# Patient Record
Sex: FEMALE | Race: WHITE | NOT HISPANIC OR LATINO | Employment: UNEMPLOYED | URBAN - METROPOLITAN AREA
[De-identification: names, ages, dates, MRNs, and addresses within clinical notes are randomized per-mention and may not be internally consistent; named-entity substitution may affect disease eponyms.]

---

## 2022-07-07 ENCOUNTER — HOSPITAL ENCOUNTER (INPATIENT)
Facility: HOSPITAL | Age: 22
LOS: 4 days | Discharge: HOME | End: 2022-07-11
Attending: EMERGENCY MEDICINE | Admitting: INTERNAL MEDICINE

## 2022-07-07 ENCOUNTER — APPOINTMENT (OUTPATIENT)
Dept: GENERAL RADIOLOGY | Facility: HOSPITAL | Age: 22
End: 2022-07-07

## 2022-07-07 DIAGNOSIS — E66.9 OBESITY (BMI 30-39.9): ICD-10-CM

## 2022-07-07 DIAGNOSIS — E13.10 DIABETIC KETOACIDOSIS WITHOUT COMA ASSOCIATED WITH OTHER SPECIFIED DIABETES MELLITUS: Primary | ICD-10-CM

## 2022-07-07 LAB
ALBUMIN SERPL-MCNC: 4.6 G/DL (ref 3.5–5.2)
ALBUMIN/GLOB SERPL: 1.4 G/DL
ALP SERPL-CCNC: 97 U/L (ref 39–117)
ALT SERPL W P-5'-P-CCNC: 21 U/L (ref 1–33)
ANION GAP SERPL CALCULATED.3IONS-SCNC: 20 MMOL/L (ref 5–15)
ANION GAP SERPL CALCULATED.3IONS-SCNC: 23 MMOL/L (ref 5–15)
ARTERIAL PATENCY WRIST A: ABNORMAL
AST SERPL-CCNC: 15 U/L (ref 1–32)
ATMOSPHERIC PRESS: ABNORMAL MM[HG]
B-HCG UR QL: NEGATIVE
B-OH-BUTYR SERPL-SCNC: 5.65 MMOL/L (ref 0.02–0.27)
BACTERIA UR QL AUTO: ABNORMAL /HPF
BASE EXCESS BLDA CALC-SCNC: -19.3 MMOL/L (ref 0–2)
BASOPHILS # BLD AUTO: 0.07 10*3/MM3 (ref 0–0.2)
BASOPHILS NFR BLD AUTO: 0.9 % (ref 0–1.5)
BDY SITE: ABNORMAL
BILIRUB SERPL-MCNC: 0.7 MG/DL (ref 0–1.2)
BILIRUB UR QL STRIP: NEGATIVE
BODY TEMPERATURE: 37 C
BUN SERPL-MCNC: 10 MG/DL (ref 6–20)
BUN SERPL-MCNC: 8 MG/DL (ref 6–20)
BUN/CREAT SERPL: 12.3 (ref 7–25)
BUN/CREAT SERPL: 12.3 (ref 7–25)
CALCIUM SPEC-SCNC: 7.6 MG/DL (ref 8.6–10.5)
CALCIUM SPEC-SCNC: 9.2 MG/DL (ref 8.6–10.5)
CHLORIDE SERPL-SCNC: 105 MMOL/L (ref 98–107)
CHLORIDE SERPL-SCNC: 99 MMOL/L (ref 98–107)
CLARITY UR: CLEAR
CO2 BLDA-SCNC: 7 MMOL/L (ref 22–33)
CO2 SERPL-SCNC: 9 MMOL/L (ref 22–29)
CO2 SERPL-SCNC: 9 MMOL/L (ref 22–29)
COHGB MFR BLD: 1.1 % (ref 0–2)
COLOR UR: YELLOW
CREAT SERPL-MCNC: 0.65 MG/DL (ref 0.57–1)
CREAT SERPL-MCNC: 0.81 MG/DL (ref 0.57–1)
DEPRECATED RDW RBC AUTO: 42.6 FL (ref 37–54)
EGFRCR SERPLBLD CKD-EPI 2021: 104.1 ML/MIN/1.73
EGFRCR SERPLBLD CKD-EPI 2021: 126.3 ML/MIN/1.73
EOSINOPHIL # BLD AUTO: 0.06 10*3/MM3 (ref 0–0.4)
EOSINOPHIL NFR BLD AUTO: 0.8 % (ref 0.3–6.2)
EPAP: 0
ERYTHROCYTE [DISTWIDTH] IN BLOOD BY AUTOMATED COUNT: 13.6 % (ref 12.3–15.4)
EXPIRATION DATE: NORMAL
FLUAV RNA RESP QL NAA+PROBE: NOT DETECTED
FLUBV RNA RESP QL NAA+PROBE: NOT DETECTED
GLOBULIN UR ELPH-MCNC: 3.4 GM/DL
GLUCOSE BLDC GLUCOMTR-MCNC: 268 MG/DL (ref 70–130)
GLUCOSE BLDC GLUCOMTR-MCNC: 270 MG/DL (ref 70–130)
GLUCOSE BLDC GLUCOMTR-MCNC: 287 MG/DL (ref 70–130)
GLUCOSE BLDC GLUCOMTR-MCNC: 350 MG/DL (ref 70–130)
GLUCOSE SERPL-MCNC: 349 MG/DL (ref 65–99)
GLUCOSE SERPL-MCNC: 508 MG/DL (ref 65–99)
GLUCOSE UR STRIP-MCNC: ABNORMAL MG/DL
HBA1C MFR BLD: 11.8 % (ref 4.8–5.6)
HCO3 BLDA-SCNC: 6.5 MMOL/L (ref 20–26)
HCT VFR BLD AUTO: 42.3 % (ref 34–46.6)
HCT VFR BLD CALC: 42.6 % (ref 38–51)
HGB BLD-MCNC: 14.9 G/DL (ref 12–15.9)
HGB BLDA-MCNC: 13.9 G/DL (ref 14–18)
HGB UR QL STRIP.AUTO: ABNORMAL
HOLD SPECIMEN: NORMAL
HYALINE CASTS UR QL AUTO: ABNORMAL /LPF
IMM GRANULOCYTES # BLD AUTO: 0.04 10*3/MM3 (ref 0–0.05)
IMM GRANULOCYTES NFR BLD AUTO: 0.5 % (ref 0–0.5)
INHALED O2 CONCENTRATION: 21 %
INTERNAL NEGATIVE CONTROL: NEGATIVE
INTERNAL POSITIVE CONTROL: POSITIVE
IPAP: 0
KETONES UR QL STRIP: ABNORMAL
LEUKOCYTE ESTERASE UR QL STRIP.AUTO: NEGATIVE
LYMPHOCYTES # BLD AUTO: 1.84 10*3/MM3 (ref 0.7–3.1)
LYMPHOCYTES NFR BLD AUTO: 23.8 % (ref 19.6–45.3)
Lab: ABNORMAL
Lab: NORMAL
MAGNESIUM SERPL-MCNC: 1.6 MG/DL (ref 1.6–2.6)
MAGNESIUM SERPL-MCNC: 1.8 MG/DL (ref 1.6–2.6)
MCH RBC QN AUTO: 30.7 PG (ref 26.6–33)
MCHC RBC AUTO-ENTMCNC: 35.2 G/DL (ref 31.5–35.7)
MCV RBC AUTO: 87 FL (ref 79–97)
METHGB BLD QL: 0.6 % (ref 0–1.5)
MODALITY: ABNORMAL
MONOCYTES # BLD AUTO: 0.49 10*3/MM3 (ref 0.1–0.9)
MONOCYTES NFR BLD AUTO: 6.3 % (ref 5–12)
NEUTROPHILS NFR BLD AUTO: 5.22 10*3/MM3 (ref 1.7–7)
NEUTROPHILS NFR BLD AUTO: 67.7 % (ref 42.7–76)
NITRITE UR QL STRIP: NEGATIVE
NOTE: ABNORMAL
NOTIFIED BY: ABNORMAL
NOTIFIED WHO: ABNORMAL
NRBC BLD AUTO-RTO: 0 /100 WBC (ref 0–0.2)
OSMOLALITY SERPL: 305 MOSM/KG (ref 275–295)
OXYHGB MFR BLDV: 96.8 % (ref 94–99)
PAW @ PEAK INSP FLOW SETTING VENT: 0 CMH2O
PCO2 BLDA: 16.7 MM HG (ref 35–45)
PCO2 TEMP ADJ BLD: 16.7 MM HG (ref 35–45)
PH BLDA: 7.2 PH UNITS (ref 7.35–7.45)
PH UR STRIP.AUTO: <=5 [PH] (ref 5–8)
PH, TEMP CORRECTED: 7.2 PH UNITS
PHOSPHATE SERPL-MCNC: 1.8 MG/DL (ref 2.5–4.5)
PHOSPHATE SERPL-MCNC: 2.4 MG/DL (ref 2.5–4.5)
PLATELET # BLD AUTO: 250 10*3/MM3 (ref 140–450)
PMV BLD AUTO: 9.5 FL (ref 6–12)
PO2 BLDA: 108 MM HG (ref 83–108)
PO2 TEMP ADJ BLD: 108 MM HG (ref 83–108)
POTASSIUM SERPL-SCNC: 3.3 MMOL/L (ref 3.5–5.2)
POTASSIUM SERPL-SCNC: 3.9 MMOL/L (ref 3.5–5.2)
PROT SERPL-MCNC: 8 G/DL (ref 6–8.5)
PROT UR QL STRIP: ABNORMAL
RBC # BLD AUTO: 4.86 10*6/MM3 (ref 3.77–5.28)
RBC # UR STRIP: ABNORMAL /HPF
REF LAB TEST METHOD: ABNORMAL
SARS-COV-2 RNA RESP QL NAA+PROBE: NOT DETECTED
SODIUM SERPL-SCNC: 131 MMOL/L (ref 136–145)
SODIUM SERPL-SCNC: 134 MMOL/L (ref 136–145)
SP GR UR STRIP: 1.03 (ref 1–1.03)
SQUAMOUS #/AREA URNS HPF: ABNORMAL /HPF
T4 FREE SERPL-MCNC: 0.99 NG/DL (ref 0.93–1.7)
TOTAL RATE: 0 BREATHS/MINUTE
TSH SERPL DL<=0.05 MIU/L-ACNC: 5.37 UIU/ML (ref 0.27–4.2)
UROBILINOGEN UR QL STRIP: ABNORMAL
WBC # UR STRIP: ABNORMAL /HPF
WBC NRBC COR # BLD: 7.72 10*3/MM3 (ref 3.4–10.8)
WHOLE BLOOD HOLD COAG: NORMAL
WHOLE BLOOD HOLD SPECIMEN: NORMAL

## 2022-07-07 PROCEDURE — 25010000002 SODIUM CHLORIDE 0.9 % WITH KCL 20 MEQ 20-0.9 MEQ/L-% SOLUTION: Performed by: EMERGENCY MEDICINE

## 2022-07-07 PROCEDURE — 82962 GLUCOSE BLOOD TEST: CPT

## 2022-07-07 PROCEDURE — 80048 BASIC METABOLIC PNL TOTAL CA: CPT | Performed by: EMERGENCY MEDICINE

## 2022-07-07 PROCEDURE — 84439 ASSAY OF FREE THYROXINE: CPT | Performed by: EMERGENCY MEDICINE

## 2022-07-07 PROCEDURE — 82375 ASSAY CARBOXYHB QUANT: CPT

## 2022-07-07 PROCEDURE — 0 MAGNESIUM SULFATE 4 GM/100ML SOLUTION: Performed by: NURSE PRACTITIONER

## 2022-07-07 PROCEDURE — 82010 KETONE BODYS QUAN: CPT | Performed by: EMERGENCY MEDICINE

## 2022-07-07 PROCEDURE — 83930 ASSAY OF BLOOD OSMOLALITY: CPT | Performed by: EMERGENCY MEDICINE

## 2022-07-07 PROCEDURE — 36600 WITHDRAWAL OF ARTERIAL BLOOD: CPT

## 2022-07-07 PROCEDURE — 99223 1ST HOSP IP/OBS HIGH 75: CPT | Performed by: INTERNAL MEDICINE

## 2022-07-07 PROCEDURE — 81001 URINALYSIS AUTO W/SCOPE: CPT | Performed by: EMERGENCY MEDICINE

## 2022-07-07 PROCEDURE — 84100 ASSAY OF PHOSPHORUS: CPT | Performed by: EMERGENCY MEDICINE

## 2022-07-07 PROCEDURE — 83050 HGB METHEMOGLOBIN QUAN: CPT

## 2022-07-07 PROCEDURE — 93005 ELECTROCARDIOGRAM TRACING: CPT | Performed by: EMERGENCY MEDICINE

## 2022-07-07 PROCEDURE — 71045 X-RAY EXAM CHEST 1 VIEW: CPT

## 2022-07-07 PROCEDURE — 83735 ASSAY OF MAGNESIUM: CPT | Performed by: EMERGENCY MEDICINE

## 2022-07-07 PROCEDURE — 82805 BLOOD GASES W/O2 SATURATION: CPT

## 2022-07-07 PROCEDURE — 87636 SARSCOV2 & INF A&B AMP PRB: CPT | Performed by: EMERGENCY MEDICINE

## 2022-07-07 PROCEDURE — 99284 EMERGENCY DEPT VISIT MOD MDM: CPT

## 2022-07-07 PROCEDURE — 81025 URINE PREGNANCY TEST: CPT | Performed by: EMERGENCY MEDICINE

## 2022-07-07 PROCEDURE — 83036 HEMOGLOBIN GLYCOSYLATED A1C: CPT | Performed by: EMERGENCY MEDICINE

## 2022-07-07 PROCEDURE — 80050 GENERAL HEALTH PANEL: CPT

## 2022-07-07 PROCEDURE — 87086 URINE CULTURE/COLONY COUNT: CPT | Performed by: NURSE PRACTITIONER

## 2022-07-07 RX ORDER — ENOXAPARIN SODIUM 100 MG/ML
40 INJECTION SUBCUTANEOUS DAILY
Status: DISCONTINUED | OUTPATIENT
Start: 2022-07-08 | End: 2022-07-11 | Stop reason: HOSPADM

## 2022-07-07 RX ORDER — POTASSIUM CHLORIDE, DEXTROSE MONOHYDRATE AND SODIUM CHLORIDE 300; 5; 900 MG/100ML; G/100ML; MG/100ML
150 INJECTION, SOLUTION INTRAVENOUS CONTINUOUS PRN
Status: DISCONTINUED | OUTPATIENT
Start: 2022-07-07 | End: 2022-07-08

## 2022-07-07 RX ORDER — DEXTROSE AND SODIUM CHLORIDE 5; .45 G/100ML; G/100ML
150 INJECTION, SOLUTION INTRAVENOUS CONTINUOUS PRN
Status: DISCONTINUED | OUTPATIENT
Start: 2022-07-07 | End: 2022-07-08

## 2022-07-07 RX ORDER — ACETAMINOPHEN 325 MG/1
650 TABLET ORAL EVERY 4 HOURS PRN
Status: DISCONTINUED | OUTPATIENT
Start: 2022-07-07 | End: 2022-07-11 | Stop reason: HOSPADM

## 2022-07-07 RX ORDER — SODIUM CHLORIDE AND POTASSIUM CHLORIDE 150; 900 MG/100ML; MG/100ML
250 INJECTION, SOLUTION INTRAVENOUS CONTINUOUS PRN
Status: DISCONTINUED | OUTPATIENT
Start: 2022-07-07 | End: 2022-07-08

## 2022-07-07 RX ORDER — DEXTROSE, SODIUM CHLORIDE, AND POTASSIUM CHLORIDE 5; .9; .15 G/100ML; G/100ML; G/100ML
150 INJECTION INTRAVENOUS CONTINUOUS PRN
Status: DISCONTINUED | OUTPATIENT
Start: 2022-07-07 | End: 2022-07-08

## 2022-07-07 RX ORDER — DEXTROSE MONOHYDRATE 25 G/50ML
10-50 INJECTION, SOLUTION INTRAVENOUS
Status: DISCONTINUED | OUTPATIENT
Start: 2022-07-07 | End: 2022-07-08

## 2022-07-07 RX ORDER — POTASSIUM CHLORIDE 7.45 MG/ML
10 INJECTION INTRAVENOUS
Status: DISCONTINUED | OUTPATIENT
Start: 2022-07-07 | End: 2022-07-11 | Stop reason: HOSPADM

## 2022-07-07 RX ORDER — SODIUM CHLORIDE 0.9 % (FLUSH) 0.9 %
10 SYRINGE (ML) INJECTION AS NEEDED
Status: DISCONTINUED | OUTPATIENT
Start: 2022-07-07 | End: 2022-07-08

## 2022-07-07 RX ORDER — ACETAMINOPHEN 160 MG/5ML
650 SOLUTION ORAL EVERY 4 HOURS PRN
Status: DISCONTINUED | OUTPATIENT
Start: 2022-07-07 | End: 2022-07-11 | Stop reason: HOSPADM

## 2022-07-07 RX ORDER — SODIUM CHLORIDE 450 MG/100ML
250 INJECTION, SOLUTION INTRAVENOUS CONTINUOUS PRN
Status: DISCONTINUED | OUTPATIENT
Start: 2022-07-07 | End: 2022-07-08

## 2022-07-07 RX ORDER — DEXTROSE, SODIUM CHLORIDE, AND POTASSIUM CHLORIDE 5; .45; .15 G/100ML; G/100ML; G/100ML
150 INJECTION INTRAVENOUS CONTINUOUS PRN
Status: DISCONTINUED | OUTPATIENT
Start: 2022-07-07 | End: 2022-07-08

## 2022-07-07 RX ORDER — ACETAMINOPHEN 650 MG/1
650 SUPPOSITORY RECTAL EVERY 4 HOURS PRN
Status: DISCONTINUED | OUTPATIENT
Start: 2022-07-07 | End: 2022-07-11 | Stop reason: HOSPADM

## 2022-07-07 RX ORDER — DEXTROSE, SODIUM CHLORIDE, AND POTASSIUM CHLORIDE 5; .45; .3 G/100ML; G/100ML; G/100ML
150 INJECTION INTRAVENOUS CONTINUOUS PRN
Status: DISCONTINUED | OUTPATIENT
Start: 2022-07-07 | End: 2022-07-08

## 2022-07-07 RX ORDER — NICOTINE POLACRILEX 4 MG
15 LOZENGE BUCCAL
Status: DISCONTINUED | OUTPATIENT
Start: 2022-07-07 | End: 2022-07-08

## 2022-07-07 RX ORDER — MAGNESIUM SULFATE HEPTAHYDRATE 40 MG/ML
2 INJECTION, SOLUTION INTRAVENOUS AS NEEDED
Status: DISCONTINUED | OUTPATIENT
Start: 2022-07-07 | End: 2022-07-11 | Stop reason: HOSPADM

## 2022-07-07 RX ORDER — POTASSIUM CHLORIDE 750 MG/1
40 CAPSULE, EXTENDED RELEASE ORAL AS NEEDED
Status: DISCONTINUED | OUTPATIENT
Start: 2022-07-07 | End: 2022-07-11 | Stop reason: HOSPADM

## 2022-07-07 RX ORDER — SODIUM CHLORIDE 0.9 % (FLUSH) 0.9 %
10 SYRINGE (ML) INJECTION EVERY 12 HOURS SCHEDULED
Status: DISCONTINUED | OUTPATIENT
Start: 2022-07-07 | End: 2022-07-11 | Stop reason: HOSPADM

## 2022-07-07 RX ORDER — MAGNESIUM SULFATE HEPTAHYDRATE 40 MG/ML
4 INJECTION, SOLUTION INTRAVENOUS AS NEEDED
Status: DISCONTINUED | OUTPATIENT
Start: 2022-07-07 | End: 2022-07-11 | Stop reason: HOSPADM

## 2022-07-07 RX ORDER — SODIUM CHLORIDE 0.9 % (FLUSH) 0.9 %
10 SYRINGE (ML) INJECTION EVERY 12 HOURS SCHEDULED
Status: DISCONTINUED | OUTPATIENT
Start: 2022-07-07 | End: 2022-07-08

## 2022-07-07 RX ORDER — SODIUM CHLORIDE AND POTASSIUM CHLORIDE 300; 900 MG/100ML; MG/100ML
250 INJECTION, SOLUTION INTRAVENOUS CONTINUOUS PRN
Status: DISCONTINUED | OUTPATIENT
Start: 2022-07-07 | End: 2022-07-08

## 2022-07-07 RX ORDER — DEXTROSE AND SODIUM CHLORIDE 5; .9 G/100ML; G/100ML
150 INJECTION, SOLUTION INTRAVENOUS CONTINUOUS PRN
Status: DISCONTINUED | OUTPATIENT
Start: 2022-07-07 | End: 2022-07-08

## 2022-07-07 RX ORDER — SODIUM CHLORIDE AND POTASSIUM CHLORIDE 150; 450 MG/100ML; MG/100ML
250 INJECTION, SOLUTION INTRAVENOUS CONTINUOUS PRN
Status: DISCONTINUED | OUTPATIENT
Start: 2022-07-07 | End: 2022-07-08

## 2022-07-07 RX ORDER — SODIUM CHLORIDE 9 MG/ML
250 INJECTION, SOLUTION INTRAVENOUS CONTINUOUS PRN
Status: DISCONTINUED | OUTPATIENT
Start: 2022-07-07 | End: 2022-07-08

## 2022-07-07 RX ORDER — POTASSIUM CHLORIDE 1.5 G/1.77G
40 POWDER, FOR SOLUTION ORAL AS NEEDED
Status: DISCONTINUED | OUTPATIENT
Start: 2022-07-07 | End: 2022-07-11 | Stop reason: HOSPADM

## 2022-07-07 RX ADMIN — SODIUM CHLORIDE 1000 ML: 9 INJECTION, SOLUTION INTRAVENOUS at 19:32

## 2022-07-07 RX ADMIN — POTASSIUM & SODIUM PHOSPHATES POWDER PACK 280-160-250 MG 2 PACKET: 280-160-250 PACK at 21:50

## 2022-07-07 RX ADMIN — INSULIN HUMAN 2.8 UNITS/HR: 1 INJECTION, SOLUTION INTRAVENOUS at 22:29

## 2022-07-07 RX ADMIN — POTASSIUM CHLORIDE 40 MEQ: 750 CAPSULE, EXTENDED RELEASE ORAL at 21:50

## 2022-07-07 RX ADMIN — POTASSIUM CHLORIDE AND SODIUM CHLORIDE 250 ML/HR: 900; 150 INJECTION, SOLUTION INTRAVENOUS at 21:35

## 2022-07-07 RX ADMIN — MAGNESIUM SULFATE HEPTAHYDRATE 4 G: 40 INJECTION, SOLUTION INTRAVENOUS at 21:52

## 2022-07-07 RX ADMIN — INSULIN HUMAN 2.1 UNITS/HR: 1 INJECTION, SOLUTION INTRAVENOUS at 21:21

## 2022-07-07 RX ADMIN — INSULIN HUMAN 2.9 UNITS/HR: 1 INJECTION, SOLUTION INTRAVENOUS at 20:00

## 2022-07-07 RX ADMIN — SODIUM CHLORIDE 1000 ML: 9 INJECTION, SOLUTION INTRAVENOUS at 18:12

## 2022-07-07 NOTE — ED PROVIDER NOTES
EMERGENCY DEPARTMENT ENCOUNTER    Pt Name: Soledad Quigley  MRN: [id number]  Pt :   2000  Room Number:  1515  Date of encounter:  2022  PCP: Provider, No Known  ED Provider: Daren Cid MD    Historian: Patient      HPI:  Chief Complaint: Weakness, shortness of breath, increased fluid intake and output        Context: Soledad Quigley is a 22 y.o. female who presents to the ED c/o gradually increasing thirst and urine output.  Symptoms of significantly increased over the last 2 weeks but have been present over the last 6 months.  She now is suffering from generalized malaise, dyspnea with exertion and polyuria/polydipsia.  The patient does not take any medications for symptom relief.  She notes a history of gestational diabetes.  She delivered her child 2 years ago and notes that her fingerstick blood sugars were in the 130s maximum.  She denies infectious symptoms.  With exception of a mild cough, nonproductive, ongoing.  She is prone to urinary tract infections but is not having any dysuria at this point.  She did just start her menstrual cycle.        PAST MEDICAL HISTORY  Past Medical History:   Diagnosis Date   • Anemia    • Gestational diabetes    • PCOS (polycystic ovarian syndrome)          PAST SURGICAL HISTORY  Past Surgical History:   Procedure Laterality Date   •  SECTION           FAMILY HISTORY  History reviewed. No pertinent family history.      SOCIAL HISTORY  Social History     Socioeconomic History   • Marital status:    Tobacco Use   • Smoking status: Never Smoker         ALLERGIES  Bactrim [sulfamethoxazole-trimethoprim]        REVIEW OF SYSTEMS  Review of Systems       All systems reviewed and negative except for those discussed in HPI.       PHYSICAL EXAM    I have reviewed the triage vital signs and nursing notes.    ED Triage Vitals [22 1640]   Temp Heart Rate Resp BP SpO2   98 °F (36.7 °C) 102 20 142/99 100 %      Temp src Heart Rate Source Patient Position  BP Location FiO2 (%)   Oral Monitor Sitting Left arm --       Physical Exam  GENERAL:   Appears pleasant, nontoxic, somewhat tired appearing.  HENT: Nares patent.  Dry mucous membranes  EYES: No scleral icterus.  CV: Regular rhythm, regular rate.  No murmurs gallops rubs.  RESPIRATORY: Normal effort.  No audible wheezes, rales or rhonchi.  Clear to auscultation  ABDOMEN: Soft, nontender, protuberant with adipose  MUSCULOSKELETAL: No deformities.   NEURO: Alert, moves all extremities, follows commands.  Clear speech.  SKIN: Warm, dry, no rash visualized.        LAB RESULTS  Recent Results (from the past 24 hour(s))   Comprehensive Metabolic Panel    Collection Time: 07/07/22  5:18 PM    Specimen: Blood   Result Value Ref Range    Glucose 508 (C) 65 - 99 mg/dL    BUN 10 6 - 20 mg/dL    Creatinine 0.81 0.57 - 1.00 mg/dL    Sodium 131 (L) 136 - 145 mmol/L    Potassium 3.9 3.5 - 5.2 mmol/L    Chloride 99 98 - 107 mmol/L    CO2 9.0 (L) 22.0 - 29.0 mmol/L    Calcium 9.2 8.6 - 10.5 mg/dL    Total Protein 8.0 6.0 - 8.5 g/dL    Albumin 4.60 3.50 - 5.20 g/dL    ALT (SGPT) 21 1 - 33 U/L    AST (SGOT) 15 1 - 32 U/L    Alkaline Phosphatase 97 39 - 117 U/L    Total Bilirubin 0.7 0.0 - 1.2 mg/dL    Globulin 3.4 gm/dL    A/G Ratio 1.4 g/dL    BUN/Creatinine Ratio 12.3 7.0 - 25.0    Anion Gap 23.0 (H) 5.0 - 15.0 mmol/L    eGFR 104.1 >60.0 mL/min/1.73   Green Top (Gel)    Collection Time: 07/07/22  5:18 PM   Result Value Ref Range    Extra Tube Hold for add-ons.    Lavender Top    Collection Time: 07/07/22  5:18 PM   Result Value Ref Range    Extra Tube hold for add-on    Gold Top - SST    Collection Time: 07/07/22  5:18 PM   Result Value Ref Range    Extra Tube Hold for add-ons.    Light Blue Top    Collection Time: 07/07/22  5:18 PM   Result Value Ref Range    Extra Tube Hold for add-ons.    CBC Auto Differential    Collection Time: 07/07/22  5:18 PM    Specimen: Blood   Result Value Ref Range    WBC 7.72 3.40 - 10.80 10*3/mm3     RBC 4.86 3.77 - 5.28 10*6/mm3    Hemoglobin 14.9 12.0 - 15.9 g/dL    Hematocrit 42.3 34.0 - 46.6 %    MCV 87.0 79.0 - 97.0 fL    MCH 30.7 26.6 - 33.0 pg    MCHC 35.2 31.5 - 35.7 g/dL    RDW 13.6 12.3 - 15.4 %    RDW-SD 42.6 37.0 - 54.0 fl    MPV 9.5 6.0 - 12.0 fL    Platelets 250 140 - 450 10*3/mm3    Neutrophil % 67.7 42.7 - 76.0 %    Lymphocyte % 23.8 19.6 - 45.3 %    Monocyte % 6.3 5.0 - 12.0 %    Eosinophil % 0.8 0.3 - 6.2 %    Basophil % 0.9 0.0 - 1.5 %    Immature Grans % 0.5 0.0 - 0.5 %    Neutrophils, Absolute 5.22 1.70 - 7.00 10*3/mm3    Lymphocytes, Absolute 1.84 0.70 - 3.10 10*3/mm3    Monocytes, Absolute 0.49 0.10 - 0.90 10*3/mm3    Eosinophils, Absolute 0.06 0.00 - 0.40 10*3/mm3    Basophils, Absolute 0.07 0.00 - 0.20 10*3/mm3    Immature Grans, Absolute 0.04 0.00 - 0.05 10*3/mm3    nRBC 0.0 0.0 - 0.2 /100 WBC   Beta Hydroxybutyrate Quantitative    Collection Time: 07/07/22  5:18 PM    Specimen: Blood   Result Value Ref Range    Beta-Hydroxybutyrate Quant 5.648 (H) 0.020 - 0.270 mmol/L   Urinalysis With Microscopic If Indicated (No Culture) - Urine, Clean Catch    Collection Time: 07/07/22  7:20 PM    Specimen: Urine, Clean Catch   Result Value Ref Range    Color, UA Yellow Yellow, Straw    Appearance, UA Clear Clear    pH, UA <=5.0 5.0 - 8.0    Specific Gravity, UA 1.035 (H) 1.001 - 1.030    Glucose, UA >=1000 mg/dL (3+) (A) Negative    Ketones, UA >=160 mg/dL (4+) (A) Negative    Bilirubin, UA Negative Negative    Blood, UA Large (3+) (A) Negative    Protein, UA 30 mg/dL (1+) (A) Negative    Leuk Esterase, UA Negative Negative    Nitrite, UA Negative Negative    Urobilinogen, UA 0.2 E.U./dL 0.2 - 1.0 E.U./dL   Urinalysis, Microscopic Only - Urine, Clean Catch    Collection Time: 07/07/22  7:20 PM    Specimen: Urine, Clean Catch   Result Value Ref Range    RBC, UA 13-20 (A) None Seen, 0-2 /HPF    WBC, UA 3-5 (A) None Seen, 0-2 /HPF    Bacteria, UA 1+ (A) None Seen, Trace /HPF    Squamous Epithelial  Cells, UA 0-2 None Seen, 0-2 /HPF    Hyaline Casts, UA 0-6 0 - 6 /LPF    Methodology Automated Microscopy    Blood Gas, Arterial With Co-Ox    Collection Time: 07/07/22  7:31 PM    Specimen: Arterial Blood   Result Value Ref Range    Site Right Radial     Venkata's Test N/A     pH, Arterial 7.197 (C) 7.350 - 7.450 pH units    pCO2, Arterial 16.7 (C) 35.0 - 45.0 mm Hg    pO2, Arterial 108.0 83.0 - 108.0 mm Hg    HCO3, Arterial 6.5 (L) 20.0 - 26.0 mmol/L    Base Excess, Arterial -19.3 (L) 0.0 - 2.0 mmol/L    Hemoglobin, Blood Gas 13.9 (L) 14 - 18 g/dL    Hematocrit, Blood Gas 42.6 38.0 - 51.0 %    Oxyhemoglobin 96.8 94 - 99 %    Methemoglobin 0.60 0.00 - 1.50 %    Carboxyhemoglobin 1.1 0 - 2 %    CO2 Content 7.0 (L) 22 - 33 mmol/L    Temperature 37.0 C    Barometric Pressure for Blood Gas      Modality Room Air     FIO2 21 %    Rate 0 Breaths/minute    PIP 0 cmH2O    IPAP 0     EPAP 0     Note      Notified Who POOJA CARBALLO MD     Notified By [id number]     Notified Time 07/07/2022 19:32     pH, Temp Corrected 7.197 pH Units    pCO2, Temperature Corrected 16.7 (L) 35 - 45 mm Hg    pO2, Temperature Corrected 108 83 - 108 mm Hg   POC Glucose Once    Collection Time: 07/07/22  7:39 PM    Specimen: Blood   Result Value Ref Range    Glucose 350 (H) 70 - 130 mg/dL       If labs were ordered, I independently reviewed the results.        RADIOLOGY  No Radiology Exams Resulted Within Past 24 Hours    I ordered and reviewed the above noted radiographic studies.      I viewed images of chest x-ray which showed no active disease per my independent interpretation.    See radiologist's dictation for official interpretation.        PROCEDURES    Critical Care  Performed by: Daren Carballo MD  Authorized by: Daren Carballo MD     Critical care provider statement:     Critical care time (minutes):  30    Critical care time was exclusive of:  Separately billable procedures and treating other patients    Critical care was necessary to  treat or prevent imminent or life-threatening deterioration of the following conditions:  Endocrine crisis and metabolic crisis    Critical care was time spent personally by me on the following activities:  Ordering and performing treatments and interventions, ordering and review of laboratory studies, ordering and review of radiographic studies, pulse oximetry, re-evaluation of patient's condition, review of old charts, obtaining history from patient or surrogate, examination of patient, evaluation of patient's response to treatment, discussions with consultants and development of treatment plan with patient or surrogate        ECG 12 Lead   Final Result   Test Reason : dka   Blood Pressure :   */*   mmHG   Vent. Rate :  64 BPM     Atrial Rate :  64 BPM      P-R Int : 134 ms          QRS Dur :  84 ms       QT Int : 446 ms       P-R-T Axes :  77  18  21 degrees      QTc Int : 460 ms      Normal sinus rhythm   Septal infarct , age undetermined   T wave abnormality, consider inferior ischemia   Abnormal ECG   No previous ECGs available   Confirmed by ASHWIN CARBALLO MD ([id number]) on 7/8/2022 9:58:20 PM      Referred By:            Confirmed By: ASHWIN CARBALLO MD          MEDICATIONS GIVEN IN ER    Medications   sodium chloride 0.9 % bolus 1,000 mL (1,000 mL Intravenous New Bag 7/7/22 1932)   sodium chloride 0.9 % flush 10 mL (has no administration in time range)   sodium chloride 0.9 % flush 10 mL (has no administration in time range)   dextrose (GLUTOSE) oral gel 15 g (has no administration in time range)   dextrose (D50W) (25 g/50 mL) IV injection 10-50 mL (has no administration in time range)   glucagon (human recombinant) (GLUCAGEN DIAGNOSTIC) injection 1 mg (has no administration in time range)   sodium chloride 0.9 % bolus (has no administration in time range)   sodium chloride 0.9 % infusion (has no administration in time range)   sodium chloride 0.9 % with KCl 20 mEq/L infusion (has no administration in time range)    sodium chloride 0.9 % with KCl 40 mEq/L infusion (has no administration in time range)   dextrose 5 % and sodium chloride 0.9 % infusion (has no administration in time range)   dextrose 5 % and sodium chloride 0.9 % with KCl 20 mEq/L infusion (has no administration in time range)   dextrose 5 % and sodium chloride 0.9 % with KCl 40 mEq/L infusion (has no administration in time range)   sodium chloride 0.45 % infusion (has no administration in time range)   sodium chloride 0.45 % with KCl 20 mEq/L infusion (has no administration in time range)   sodium chloride 0.45 % 1,000 mL with potassium chloride 40 mEq infusion (has no administration in time range)   dextrose 5 % and sodium chloride 0.45 % infusion (has no administration in time range)   dextrose 5 % and sodium chloride 0.45 % with KCl 20 mEq/L infusion (has no administration in time range)   dextrose 5 % and sodium chloride 0.45 % with KCl 40 mEq/L infusion (has no administration in time range)   insulin regular 1 unit/mL in 0.9% sodium chloride (has no administration in time range)   sodium chloride 0.9 % bolus 1,000 mL (has no administration in time range)   sodium chloride 0.9 % bolus 1,000 mL (0 mL Intravenous Stopped 7/7/22 1921)         PROGRESS, DATA ANALYSIS, CONSULTS, AND MEDICAL DECISION MAKING    All labs have been independently reviewed by me.  All radiology studies have been reviewed by me and the radiologist dictating the report.   EKG's have been independently viewed and interpreted by me.      Differential diagnoses: DKA      ED Course as of 07/07/22 1953   Thu Jul 07, 2022 1940 I bolused the patient with 2 L normal saline and her blood sugar is dropped from 500 down to 350. [MS]   1945 I have started insulin drip by [facility] protocol.  I have contacted Dr. Austin, hospitalist for admission. [MS]   1949 I spoke with Dr. Austin, hospitalist who will admit. [MS]      ED Course User Index  [MS] Daren Cid MD             AS OF 19:53  EDT VITALS:    BP - 151/93  HR - 95  TEMP - 98 °F (36.7 °C) (Oral)  O2 SATS - 97%                  DIAGNOSIS  Final diagnoses:   Diabetic ketoacidosis without coma associated with other specified diabetes mellitus (HCC)         DISPOSITION  Admission           Daren Cid MD  07/08/22 2534

## 2022-07-08 PROBLEM — E66.9 OBESITY (BMI 30-39.9): Status: ACTIVE | Noted: 2022-07-08

## 2022-07-08 LAB
ANION GAP SERPL CALCULATED.3IONS-SCNC: 10 MMOL/L (ref 5–15)
ANION GAP SERPL CALCULATED.3IONS-SCNC: 11 MMOL/L (ref 5–15)
ANION GAP SERPL CALCULATED.3IONS-SCNC: 14 MMOL/L (ref 5–15)
ANION GAP SERPL CALCULATED.3IONS-SCNC: 15 MMOL/L (ref 5–15)
ANION GAP SERPL CALCULATED.3IONS-SCNC: 18 MMOL/L (ref 5–15)
BUN SERPL-MCNC: 3 MG/DL (ref 6–20)
BUN SERPL-MCNC: 4 MG/DL (ref 6–20)
BUN SERPL-MCNC: 5 MG/DL (ref 6–20)
BUN SERPL-MCNC: 5 MG/DL (ref 6–20)
BUN SERPL-MCNC: 6 MG/DL (ref 6–20)
BUN/CREAT SERPL: 10.3 (ref 7–25)
BUN/CREAT SERPL: 11.4 (ref 7–25)
BUN/CREAT SERPL: 11.6 (ref 7–25)
BUN/CREAT SERPL: 11.9 (ref 7–25)
BUN/CREAT SERPL: 8.3 (ref 7–25)
CALCIUM SPEC-SCNC: 7.2 MG/DL (ref 8.6–10.5)
CALCIUM SPEC-SCNC: 7.3 MG/DL (ref 8.6–10.5)
CALCIUM SPEC-SCNC: 7.5 MG/DL (ref 8.6–10.5)
CALCIUM SPEC-SCNC: 7.9 MG/DL (ref 8.6–10.5)
CALCIUM SPEC-SCNC: 8 MG/DL (ref 8.6–10.5)
CHLORIDE SERPL-SCNC: 105 MMOL/L (ref 98–107)
CHLORIDE SERPL-SCNC: 107 MMOL/L (ref 98–107)
CHLORIDE SERPL-SCNC: 109 MMOL/L (ref 98–107)
CHLORIDE SERPL-SCNC: 112 MMOL/L (ref 98–107)
CHLORIDE SERPL-SCNC: 112 MMOL/L (ref 98–107)
CO2 SERPL-SCNC: 10 MMOL/L (ref 22–29)
CO2 SERPL-SCNC: 10 MMOL/L (ref 22–29)
CO2 SERPL-SCNC: 12 MMOL/L (ref 22–29)
CO2 SERPL-SCNC: 14 MMOL/L (ref 22–29)
CO2 SERPL-SCNC: 15 MMOL/L (ref 22–29)
CREAT SERPL-MCNC: 0.35 MG/DL (ref 0.57–1)
CREAT SERPL-MCNC: 0.36 MG/DL (ref 0.57–1)
CREAT SERPL-MCNC: 0.42 MG/DL (ref 0.57–1)
CREAT SERPL-MCNC: 0.43 MG/DL (ref 0.57–1)
CREAT SERPL-MCNC: 0.58 MG/DL (ref 0.57–1)
EGFRCR SERPLBLD CKD-EPI 2021: 129.8 ML/MIN/1.73
EGFRCR SERPLBLD CKD-EPI 2021: 139.5 ML/MIN/1.73
EGFRCR SERPLBLD CKD-EPI 2021: 140.3 ML/MIN/1.73
EGFRCR SERPLBLD CKD-EPI 2021: 145.6 ML/MIN/1.73
EGFRCR SERPLBLD CKD-EPI 2021: 146.6 ML/MIN/1.73
GLUCOSE BLDC GLUCOMTR-MCNC: 111 MG/DL (ref 70–130)
GLUCOSE BLDC GLUCOMTR-MCNC: 112 MG/DL (ref 70–130)
GLUCOSE BLDC GLUCOMTR-MCNC: 126 MG/DL (ref 70–130)
GLUCOSE BLDC GLUCOMTR-MCNC: 133 MG/DL (ref 70–130)
GLUCOSE BLDC GLUCOMTR-MCNC: 138 MG/DL (ref 70–130)
GLUCOSE BLDC GLUCOMTR-MCNC: 141 MG/DL (ref 70–130)
GLUCOSE BLDC GLUCOMTR-MCNC: 143 MG/DL (ref 70–130)
GLUCOSE BLDC GLUCOMTR-MCNC: 146 MG/DL (ref 70–130)
GLUCOSE BLDC GLUCOMTR-MCNC: 147 MG/DL (ref 70–130)
GLUCOSE BLDC GLUCOMTR-MCNC: 149 MG/DL (ref 70–130)
GLUCOSE BLDC GLUCOMTR-MCNC: 162 MG/DL (ref 70–130)
GLUCOSE BLDC GLUCOMTR-MCNC: 167 MG/DL (ref 70–130)
GLUCOSE BLDC GLUCOMTR-MCNC: 170 MG/DL (ref 70–130)
GLUCOSE BLDC GLUCOMTR-MCNC: 174 MG/DL (ref 70–130)
GLUCOSE BLDC GLUCOMTR-MCNC: 174 MG/DL (ref 70–130)
GLUCOSE BLDC GLUCOMTR-MCNC: 193 MG/DL (ref 70–130)
GLUCOSE BLDC GLUCOMTR-MCNC: 212 MG/DL (ref 70–130)
GLUCOSE BLDC GLUCOMTR-MCNC: 215 MG/DL (ref 70–130)
GLUCOSE BLDC GLUCOMTR-MCNC: 244 MG/DL (ref 70–130)
GLUCOSE BLDC GLUCOMTR-MCNC: 265 MG/DL (ref 70–130)
GLUCOSE BLDC GLUCOMTR-MCNC: 90 MG/DL (ref 70–130)
GLUCOSE SERPL-MCNC: 139 MG/DL (ref 65–99)
GLUCOSE SERPL-MCNC: 143 MG/DL (ref 65–99)
GLUCOSE SERPL-MCNC: 144 MG/DL (ref 65–99)
GLUCOSE SERPL-MCNC: 262 MG/DL (ref 65–99)
GLUCOSE SERPL-MCNC: 269 MG/DL (ref 65–99)
MAGNESIUM SERPL-MCNC: 2 MG/DL (ref 1.6–2.6)
MAGNESIUM SERPL-MCNC: 2 MG/DL (ref 1.6–2.6)
MAGNESIUM SERPL-MCNC: 2.1 MG/DL (ref 1.6–2.6)
MAGNESIUM SERPL-MCNC: 2.3 MG/DL (ref 1.6–2.6)
MAGNESIUM SERPL-MCNC: 2.5 MG/DL (ref 1.6–2.6)
PHOSPHATE SERPL-MCNC: 1.1 MG/DL (ref 2.5–4.5)
PHOSPHATE SERPL-MCNC: 1.3 MG/DL (ref 2.5–4.5)
PHOSPHATE SERPL-MCNC: 1.4 MG/DL (ref 2.5–4.5)
PHOSPHATE SERPL-MCNC: 1.8 MG/DL (ref 2.5–4.5)
PHOSPHATE SERPL-MCNC: 1.8 MG/DL (ref 2.5–4.5)
POTASSIUM SERPL-SCNC: 3.1 MMOL/L (ref 3.5–5.2)
POTASSIUM SERPL-SCNC: 3.3 MMOL/L (ref 3.5–5.2)
POTASSIUM SERPL-SCNC: 3.6 MMOL/L (ref 3.5–5.2)
QT INTERVAL: 446 MS
QTC INTERVAL: 460 MS
SODIUM SERPL-SCNC: 130 MMOL/L (ref 136–145)
SODIUM SERPL-SCNC: 135 MMOL/L (ref 136–145)
SODIUM SERPL-SCNC: 135 MMOL/L (ref 136–145)
SODIUM SERPL-SCNC: 136 MMOL/L (ref 136–145)
SODIUM SERPL-SCNC: 138 MMOL/L (ref 136–145)

## 2022-07-08 PROCEDURE — 83735 ASSAY OF MAGNESIUM: CPT | Performed by: EMERGENCY MEDICINE

## 2022-07-08 PROCEDURE — G0108 DIAB MANAGE TRN  PER INDIV: HCPCS

## 2022-07-08 PROCEDURE — 99233 SBSQ HOSP IP/OBS HIGH 50: CPT | Performed by: INTERNAL MEDICINE

## 2022-07-08 PROCEDURE — 80048 BASIC METABOLIC PNL TOTAL CA: CPT | Performed by: EMERGENCY MEDICINE

## 2022-07-08 PROCEDURE — 0 POTASSIUM CHLORIDE PER 2 MEQ: Performed by: EMERGENCY MEDICINE

## 2022-07-08 PROCEDURE — 84100 ASSAY OF PHOSPHORUS: CPT | Performed by: EMERGENCY MEDICINE

## 2022-07-08 PROCEDURE — 82962 GLUCOSE BLOOD TEST: CPT

## 2022-07-08 PROCEDURE — 25010000002 SODIUM CHLORIDE 0.9 % WITH KCL 20 MEQ 20-0.9 MEQ/L-% SOLUTION: Performed by: NURSE PRACTITIONER

## 2022-07-08 PROCEDURE — 63710000001 INSULIN DETEMIR PER 5 UNITS: Performed by: NURSE PRACTITIONER

## 2022-07-08 PROCEDURE — 83735 ASSAY OF MAGNESIUM: CPT | Performed by: INTERNAL MEDICINE

## 2022-07-08 PROCEDURE — 80048 BASIC METABOLIC PNL TOTAL CA: CPT | Performed by: INTERNAL MEDICINE

## 2022-07-08 PROCEDURE — 84100 ASSAY OF PHOSPHORUS: CPT | Performed by: INTERNAL MEDICINE

## 2022-07-08 RX ORDER — DEXTROSE MONOHYDRATE 25 G/50ML
25 INJECTION, SOLUTION INTRAVENOUS
Status: DISCONTINUED | OUTPATIENT
Start: 2022-07-08 | End: 2022-07-11 | Stop reason: HOSPADM

## 2022-07-08 RX ORDER — FENTANYL/ROPIVACAINE/NS/PF 2-625MCG/1
15 PLASTIC BAG, INJECTION (ML) EPIDURAL AS NEEDED
Status: DISCONTINUED | OUTPATIENT
Start: 2022-07-08 | End: 2022-07-11 | Stop reason: HOSPADM

## 2022-07-08 RX ORDER — INSULIN LISPRO 100 [IU]/ML
0-7 INJECTION, SOLUTION INTRAVENOUS; SUBCUTANEOUS EVERY 4 HOURS
Status: DISCONTINUED | OUTPATIENT
Start: 2022-07-09 | End: 2022-07-10

## 2022-07-08 RX ORDER — SODIUM CHLORIDE AND POTASSIUM CHLORIDE 150; 900 MG/100ML; MG/100ML
100 INJECTION, SOLUTION INTRAVENOUS CONTINUOUS
Status: ACTIVE | OUTPATIENT
Start: 2022-07-08 | End: 2022-07-09

## 2022-07-08 RX ORDER — SODIUM BICARBONATE 650 MG/1
650 TABLET ORAL 2 TIMES DAILY
Status: DISCONTINUED | OUTPATIENT
Start: 2022-07-08 | End: 2022-07-10

## 2022-07-08 RX ORDER — NICOTINE POLACRILEX 4 MG
15 LOZENGE BUCCAL
Status: DISCONTINUED | OUTPATIENT
Start: 2022-07-08 | End: 2022-07-11 | Stop reason: HOSPADM

## 2022-07-08 RX ADMIN — Medication 10 ML: at 09:16

## 2022-07-08 RX ADMIN — POTASSIUM PHOSPHATE, MONOBASIC AND POTASSIUM PHOSPHATE, DIBASIC 45 MMOL: 224; 236 INJECTION, SOLUTION, CONCENTRATE INTRAVENOUS at 22:49

## 2022-07-08 RX ADMIN — POTASSIUM CHLORIDE, DEXTROSE MONOHYDRATE AND SODIUM CHLORIDE 150 ML/HR: 150; 5; 450 INJECTION, SOLUTION INTRAVENOUS at 12:27

## 2022-07-08 RX ADMIN — DEXTROSE MONOHYDRATE, SODIUM CHLORIDE, AND POTASSIUM CHLORIDE 150 ML/HR: 50; 9; 1.49 INJECTION, SOLUTION INTRAVENOUS at 00:44

## 2022-07-08 RX ADMIN — INSULIN HUMAN 6.5 UNITS/HR: 1 INJECTION, SOLUTION INTRAVENOUS at 18:22

## 2022-07-08 RX ADMIN — INSULIN DETEMIR 15 UNITS: 100 INJECTION, SOLUTION SUBCUTANEOUS at 21:19

## 2022-07-08 RX ADMIN — SODIUM BICARBONATE 650 MG TABLET 650 MG: at 18:21

## 2022-07-08 RX ADMIN — POTASSIUM CHLORIDE 40 MEQ: 750 CAPSULE, EXTENDED RELEASE ORAL at 02:08

## 2022-07-08 RX ADMIN — Medication 10 ML: at 22:52

## 2022-07-08 RX ADMIN — INSULIN HUMAN 3.2 UNITS/HR: 1 INJECTION, SOLUTION INTRAVENOUS at 14:57

## 2022-07-08 RX ADMIN — INSULIN HUMAN 5.4 UNITS/HR: 1 INJECTION, SOLUTION INTRAVENOUS at 19:23

## 2022-07-08 RX ADMIN — POTASSIUM CHLORIDE AND SODIUM CHLORIDE 100 ML/HR: 900; 150 INJECTION, SOLUTION INTRAVENOUS at 21:19

## 2022-07-08 RX ADMIN — POTASSIUM & SODIUM PHOSPHATES POWDER PACK 280-160-250 MG 2 PACKET: 280-160-250 PACK at 04:55

## 2022-07-08 RX ADMIN — POTASSIUM CHLORIDE, DEXTROSE MONOHYDRATE AND SODIUM CHLORIDE 150 ML/HR: 150; 5; 450 INJECTION, SOLUTION INTRAVENOUS at 03:56

## 2022-07-08 RX ADMIN — INSULIN HUMAN 2.4 UNITS/HR: 1 INJECTION, SOLUTION INTRAVENOUS at 00:44

## 2022-07-08 NOTE — CASE MANAGEMENT/SOCIAL WORK
Discharge Planning Assessment  [facility]     Patient Name: Soledad Quigley  MRN: [id number]  Today's Date: 7/8/2022    Admit Date: 7/7/2022     Discharge Needs Assessment     Row Name 07/08/22 1118       Living Environment    People in Home child(mallorie), dependent;spouse    Current Living Arrangements home    Primary Care Provided by self    Provides Primary Care For child(mallorie)    Able to Return to Prior Arrangements yes       Transition Planning    Patient/Family Anticipates Transition to home    Transportation Anticipated family or friend will provide       Discharge Needs Assessment    Readmission Within the Last 30 Days no previous admission in last 30 days    Equipment Currently Used at Home none    Equipment Needed After Discharge glucometer               Discharge Plan     Row Name 07/08/22 1119       Plan    Plan home    Plan Comments I met with Mrs. Quigley at the bedside. She lives in [facility] with her spouse and child. She ambulates independently and is independent with activities of daily living. She is admitted with DKA. She does not have a glucometer at home and has never administered insulin. Diabetes education has been consulted. She does not have a primary care provider. She is agreeable to case management scheduling an appointment with a [employer] provider. I have scheduled a follow up appointment to establish care after hospital discharge and have placed the information in the AVS. New medications will be filled in our retail pharmacy at the time of discharge.    Final Discharge Disposition Code 01 - home or self-care              Continued Care and Services - Admitted Since 7/7/2022    Coordination has not been started for this encounter.          Demographic Summary     Row Name 07/08/22 1118       General Information    General Information Comments I confirmed that [place] [employer] is Mrs. Quigley' PCP.               Functional Status     Row Name 07/08/22 1118       Functional Status, IADL     Medications independent    Meal Preparation independent    Housekeeping independent    Laundry independent    Shopping independent               Psychosocial    No documentation.                Abuse/Neglect    No documentation.                Legal    No documentation.                Substance Abuse    No documentation.                Patient Forms    No documentation.                   Mayank Stearns RN

## 2022-07-08 NOTE — PROGRESS NOTES
[facility] Medicine Services  PROGRESS NOTE    Patient Name: Soledad Quigley  : 2000  MRN: [id number]    Date of Admission: 2022  Primary Care Physician: Provider, No Known    Subjective   Subjective     CC:  Severe fatigue, polyuria, nausea    HPI: Says she feels a lot better than she has been.  No focal complaints. She managed her glucoses during pregnancy with diet; checked sugars, has family with diabetes.      ROS:  Gen- No fevers, chills, still a bit tired   CV- No chest pain, palpitations  Resp- No cough, dyspnea  GI- No N/V/D, abd pain         Objective   Objective     Vital Signs:   Temp:  [97.8 °F (36.6 °C)-98.4 °F (36.9 °C)] 97.8 °F (36.6 °C)  Heart Rate:  [] 87  Resp:  [16-20] 16  BP: (124-151)/(71-99) 129/87     Physical Exam:  Constitutional: Awake, alert  Eyes: PERRLA, sclerae anicteric, no conjunctival injection  HENT: NCAT, mucous membranes moist  Neck: Supple, trachea midline  Respiratory: Clear to auscultation bilaterally, nonlabored respirations   Cardiovascular: RRR, no murmurs  Gastrointestinal: Positive bowel sounds, soft, nontender, nondistended  Musculoskeletal: No bilateral ankle edema, no clubbing or cyanosis to extremities  Psychiatric: Appropriate affect, cooperative  Neurologic: Oriented x 3, strength symmetric in all extremities, Cranial Nerves grossly intact to confrontation, speech clear  Skin: No rashes      Results Reviewed:  LAB RESULTS:      Lab 22  1718   WBC 7.72   HEMOGLOBIN 14.9   HEMATOCRIT 42.3   PLATELETS 250   NEUTROS ABS 5.22   IMMATURE GRANS (ABS) 0.04   LYMPHS ABS 1.84   MONOS ABS 0.49   EOS ABS 0.06   MCV 87.0         Lab 22  0335 22  2358 22  1718   SODIUM 130* 135* 134* 131*   POTASSIUM 3.6 3.6 3.3* 3.9   CHLORIDE 105 107 105 99   CO2 10.0* 10.0* 9.0* 9.0*   ANION GAP 15.0 18.0* 20.0* 23.0*   BUN 5* 6 8 10   CREATININE 0.42* 0.58 0.65 0.81   EGFR 140.3 129.8 126.3 104.1   GLUCOSE 269*  262* 349* 508*   CALCIUM 7.5* 7.9* 7.6* 9.2   MAGNESIUM 2.5 2.3 1.6 1.8   PHOSPHORUS 1.8* 1.8* 1.8* 2.4*   HEMOGLOBIN A1C  --   --   --  11.80*   TSH  --   --   --  5.370*         Lab 07/07/22  1718   TOTAL PROTEIN 8.0   ALBUMIN 4.60   GLOBULIN 3.4   ALT (SGPT) 21   AST (SGOT) 15   BILIRUBIN 0.7   ALK PHOS 97                     Lab 07/07/22 1931   PH, ARTERIAL 7.197*   PCO2, ARTERIAL 16.7*   PO2 .0   FIO2 21   HCO3 ART 6.5*   BASE EXCESS ART -19.3*   CARBOXYHEMOGLOBIN 1.1     Brief Urine Lab Results  (Last result in the past 365 days)      Color   Clarity   Blood   Leuk Est   Nitrite   Protein   CREAT   Urine HCG        07/07/22 1955               Negative             Microbiology Results Abnormal     Procedure Component Value - Date/Time    COVID PRE-OP / PRE-PROCEDURE SCREENING ORDER (NO ISOLATION) - Swab, Nasopharynx [[id number]]  (Normal) Collected: 07/07/22 1934    Lab Status: Final result Specimen: Swab from Nasopharynx Updated: 07/07/22 2004    Narrative:      The following orders were created for panel order COVID PRE-OP / PRE-PROCEDURE SCREENING ORDER (NO ISOLATION) - Swab, Nasopharynx.  Procedure                               Abnormality         Status                     ---------                               -----------         ------                     COVID-19 and FLU A/B PCR...[[id number]]  Normal              Final result                 Please view results for these tests on the individual orders.    COVID-19 and FLU A/B PCR - Swab, Nasopharynx [[id number]]  (Normal) Collected: 07/07/22 1934    Lab Status: Final result Specimen: Swab from Nasopharynx Updated: 07/07/22 2004     COVID19 Not Detected     Influenza A PCR Not Detected     Influenza B PCR Not Detected    Narrative:      Fact sheet for providers: https://www.fda.gov/media/609200/download    Fact sheet for patients: https://www.fda.gov/media/530293/download    Test performed by PCR.          XR Chest 1 View    Result Date:  7/7/2022  DATE OF EXAM: 7/7/2022 7:41 PM  PROCEDURE: XR CHEST 1 VW-  INDICATIONS: dka; E13.10-Other specified diabetes mellitus with ketoacidosis without coma  COMPARISON: No comparisons available.  TECHNIQUE: Single radiographic AP view of the chest was obtained.  FINDINGS: The heart is not definitely enlarged. The lungs seem clear. There are no pleural effusions. The visualized osseous structures do not appear unusual.      Impression: 1.  An acute pulmonary process is not apparent.  This report was finalized on 7/7/2022 7:57 PM by Florencio Ibrahim MD.            I have reviewed the medications:  Scheduled Meds:enoxaparin, 40 mg, Subcutaneous, Daily  sodium chloride, 1,000 mL, Intravenous, Once  sodium chloride, 10 mL, Intravenous, Q12H  sodium chloride, 10 mL, Intravenous, Q12H      Continuous Infusions:dextrose 5 % and sodium chloride 0.45 %, 150 mL/hr  dextrose 5 % and sodium chloride 0.45 % with KCl 20 mEq/L, 150 mL/hr, Last Rate: 150 mL/hr (07/08/22 0356)  dextrose 5 % and sodium chloride 0.45 % with KCl 40 mEq/L, 150 mL/hr  dextrose 5 % and sodium chloride 0.9 %, 150 mL/hr  dextrose 5 % and sodium chloride 0.9 % with KCl 20 mEq, 150 mL/hr, Last Rate: 150 mL/hr (07/08/22 0044)  dextrose 5% and sodium chloride 0.9% with KCl 40 mEq/L, 150 mL/hr  insulin, 0-100 Units/hr, Last Rate: 6.6 Units/hr (07/08/22 0753)      PRN Meds:.•  acetaminophen **OR** acetaminophen **OR** acetaminophen  •  dextrose  •  dextrose  •  dextrose 5 % and sodium chloride 0.45 %  •  dextrose 5 % and sodium chloride 0.45 % with KCl 20 mEq/L  •  dextrose 5 % and sodium chloride 0.45 % with KCl 40 mEq/L  •  dextrose 5 % and sodium chloride 0.9 %  •  dextrose 5 % and sodium chloride 0.9 % with KCl 20 mEq  •  dextrose 5% and sodium chloride 0.9% with KCl 40 mEq/L  •  glucagon (human recombinant)  •  magnesium sulfate **OR** magnesium sulfate **OR** magnesium sulfate  •  potassium & sodium phosphates **OR** potassium & sodium phosphates  •   potassium chloride **OR** potassium chloride **OR** potassium chloride  •  sodium chloride  •  sodium chloride    Assessment & Plan   Assessment & Plan     Active Hospital Problems    Diagnosis  POA   • **Diabetic ketoacidosis without coma associated with other specified diabetes mellitus (HCC) [E13.10]  Yes   • Obesity (BMI 30-39.9) [E66.9]  Yes      Resolved Hospital Problems   No resolved problems to display.        Brief Hospital Course to date:  Soledad Quigley is a 22 y.o. female with PMH significant for gestational DM and HTN, anxiety, hypothyroid, obesity.  She came to ED with months of polyuria and polydipsia, worse in the past two weeks.  She takes no home meds and does not have a PCP.  In the ED, her glucose was 508 and anion gap 23.  Ph was 7.2      DKA  New diagnosis of diabetes  - A1C 11.8  - insulin gtt and fluids ongoing  - serum bicarb was 10 at last check , now 12 though AG only 14.   - continue insulin gtt per [facility], replace electrolytes prn  - Diabetic educator in room.    - plan on referral to a [place] endocrinologist at [facility] - amenable to improving her diet       Expected Discharge Location and Transportation: home  Expected Discharge Date: 1-2 days    DVT prophylaxis:  Medical DVT prophylaxis orders are present.          CODE STATUS:   Code Status and Medical Interventions:   Ordered at: 07/07/22 5549     Code Status (Patient has no pulse and is not breathing):    CPR (Attempt to Resuscitate)     Medical Interventions (Patient has pulse or is breathing):    Full Support       Cookie oHng MD  07/08/22

## 2022-07-08 NOTE — CONSULTS
Diabetes Education    Patient Name:  Soledad Quigley  YOB: 2000  MRN: [id number]  Admit Date:  7/7/2022    Consult for diabetes education received per DKA protocol. Noted per chart review this is a new diagnosis for pt. She has hx of GDM (diet controlled) and has checked her blood sugar before. She states she is aware of history of GDM and strong family history increased her risk of developing diabetes. She states she is familiar with insulin administration.  She has given herself a shot using a pen & needle before (weight loss drug Saxenda). Chart reviewed. Pt was seen at bedside today. Permission given for visit.     Discussed and taught pt about diabetes self-management, risk factors, and importance of blood glucose control to reduce complications. We discussed the difference between type 1 and type 2 diabetes. Plan for pt to f/u with PCP and Endocrinology at discharge. Discussed additional testing to differentiate which type of diabetes pt has. She reports her symptoms of hyperglycemia started about a month ago, and quite suddenly. She reports polyuria and polydipsia, and that she was suddenly urinating 3-4 times during the night, and had never previously had this issue. She also reports severe fatigue. We discussed why these are symptoms of diabetes, and how the cells of her body were not able to get the fuel they needed due to not enough insulin. We discussed what DKA is and its causes. Pt reports experiencing GI symptoms of DKA. Described to pt other symptoms such as fruity breath, rapid breathing to pt and importance of seeking medical attention. Importance of taking insulin to avoid DKA stressed to pt. Noted per chart review she does not have a PCP but will be set up with one at discharge. Stressed importance of close, ongoing follow-up with providers. She was also provided outpatient office contact information so that we can also act a resource for her, should she have any questions or need  assistance after discharge.    Target blood glucose readings and A1c goals per ADA were reviewed. Reviewed with patient current A1c 11.8 and discussed its significance. Reviewed ADA diagnostic criteria for diabetes diagnosis. She states she is not necessarily surprised, and she had an idea this may be what was going on prior to coming to the hospital. Signs, symptoms, causes, and treatment of hyperglycemia and hypoglycemia were discussed. Pt is able to teach back appropriate treatment of hypoglycemia using rule of 15's. We discussed insulin therapy and its indication in her treatment plan. At time of visit, discharge medication regimen not determined. Discussed rapid acting meal time insulin and long-acting basal insulin and the difference between the two.    Discussed and taught patient about current hospital insulins including the onset, peak, and duration of the insulins. Taught patient the correct sites for insulin injections and the importance of rotating insulin injection site. Taught patient the correct storage for opened insulin at room temperature, storage for unopened insulin in the refrigerator, and expiration dates for insulin. Also, discussed and taught patient the correct disposable of sharps.  Demonstrated and taught patient how to use demo pen with appropriate injection technique on the demo pillow. Patient was able to return demonstration without difficulty. Discussed and taught patient the s/s of hypoglycemia and treatment measures for hypoglycemia. Patient was encouraged to follow up with their primary caregiver and/or local pharmacist for any questions or concerns.  Also reviewed vial and syringe insulin administration with pt. She was provided written instructions for her reference on both types of insulin administration.     Meter education: One Touch Verio Reflect donated glucose meter provided. Instructed to check expiration date and safe storage of glucose test strips, how to do a control  "test, prepare lancing device, obtain a sample, and perform test, safe disposal of lancets.  Testing frequency per MD instructions, provided general guidelines on target blood glucose, advised patient to discuss personal targets for glucose at next visit.  Record keeping discussed. Pt was able to return demonstration using teach back method. Discussed and demonstrated how to log test result.  Urged to call 1-800 number on back of meter if have any difficulties, complete the warranty card and mail.  Urged patient to obtain prescriptions for test strips and lancets from provider. She was provided QR code hand-out for referencing  websites as a resource.     Lifestyle changes such as physical activity with MD approval and healthy eating were encouraged. We discussed how diabetes affects blood vessels and can cause problems with eyes, kidneys, circulation, infection, wound healing, as well as increase her risk for heart attack and stroke. Encouraged pt to monitor blood sugar at home 3-4  times per day and to call PCP if blood sugar is trending high. Encouraged to keep record of blood glucose readings to take to follow up appointment with PCP. She states she understands that this is important. Provided patient with copy of [facility]'s \"What is Diabetes\" handout, \"Blood Glucose Goals\" handout, and \"What is A1c\" handout, as well as [facility] \"Diabetes Basics\" booklet, \"My Plate\" handout, and handout with more information about her donated glucose meter and how to obtain supplies.    Pt was encouraged to request referral for comprehensive outpatient education once established with a PCP. Will plan for telephone follow-up next week. Pt was encouraged to reach out via email or phone should she have any questions in the interim.     Thank you for this consult.     Electronically signed by:  Trinidad Lopez RN  07/08/22 14:12 EDT  "

## 2022-07-08 NOTE — H&P
[facility] Medicine Services  HISTORY AND PHYSICAL    Patient Name: Soledad Quigley  : 2000  MRN: [id number]  Primary Care Physician: Provider, No Known  Date of admission: 2022    Subjective   Subjective     Chief Complaint:  Increased thirst, urination, SOB    HPI:  Soledad Quigley is a 22 y.o. female with PMH significant for gestational DM and HTN, anxiety, hypothyroid, obesity, who presents to the ED with complaint of increased thirst and urination as well as SOB.   She states that over the past several months she has noticed that she has had increased thirst with increased urination. Over the past two weeks she states that it has significantly worsened. She denies fever, chest pain, urinary pain, or diarrhea. She does note that she has had intermittent dry cough and SOB that has started today. She denies any edema. She admits that she has not had primary medical since the birth of her child 2 years ago and takes no daily home medications.   Upon arrival to the ED, her BG was 508. She had anion gap of 23. ABG revealed pH 7.197 and beta-hydroxybutyrate 5.648.   She was given IVF bolus x 3 as well as started on glucomander DKA protocol while in the ED.   She will be admitted to Hospital Medicine for further evaluation.     Review of Systems   Constitutional: Positive for fatigue. Negative for activity change, appetite change, chills, diaphoresis, fever and unexpected weight change.   Eyes: Negative.  Negative for visual disturbance.   Respiratory: Positive for cough and shortness of breath. Negative for choking, chest tightness and wheezing.    Cardiovascular: Negative.  Negative for chest pain, palpitations and leg swelling.   Gastrointestinal: Negative.  Negative for abdominal distention, abdominal pain, constipation, diarrhea, nausea and vomiting.   Endocrine: Positive for polydipsia and polyphagia.   Genitourinary: Positive for frequency. Negative for decreased urine volume,  difficulty urinating, dysuria, pelvic pain and urgency.   Musculoskeletal: Negative for arthralgias, back pain, gait problem and myalgias.   Skin: Negative.  Negative for color change, pallor, rash and wound.   Allergic/Immunologic: Negative.  Negative for immunocompromised state.   Neurological: Positive for headaches. Negative for dizziness, seizures, syncope, speech difficulty, weakness, light-headedness and numbness.   Hematological: Negative.  Does not bruise/bleed easily.   Psychiatric/Behavioral: Negative.  Negative for confusion. The patient is not nervous/anxious.         All other systems reviewed and are negative.     Personal History     Past Medical History:   Diagnosis Date   • Anemia    • Gestational diabetes    • Gestational hypertension, third trimester    • Hypothyroid    • Obesity (BMI 35.0-39.9 without comorbidity)    • PCOS (polycystic ovarian syndrome)        Past Surgical History:   Procedure Laterality Date   •  SECTION         Family History:  family history includes Breast cancer in her mother; Diabetes in her father; Diabetes type I in her paternal grandmother; Hyperlipidemia in her father. Otherwise pertinent FHx was reviewed and unremarkable.     Social History:  reports that she has never smoked. She has never used smokeless tobacco. She reports that she does not drink alcohol and does not use drugs.      Medications:       Allergies   Allergen Reactions   • Sulfa Antibiotics Diarrhea, Nausea And Vomiting and GI Intolerance   • Bactrim [Sulfamethoxazole-Trimethoprim] Diarrhea, Nausea And Vomiting and GI Intolerance       Objective   Objective     Vital Signs:   Temp:  [98 °F (36.7 °C)] 98 °F (36.7 °C)  Heart Rate:  [] 95  Resp:  [20] 20  BP: (132-151)/(81-99) 151/93    Physical Exam   Constitutional: Awake, alert  Eyes: PERRLA, sclerae anicteric, no conjunctival injection  HENT: NCAT, mucous membranes moist  Neck: Supple, no thyromegaly, no lymphadenopathy, trachea  midline  Respiratory: Clear to auscultation bilaterally, nonlabored respirations   Cardiovascular: RRR, no murmurs, rubs, or gallops, palpable pedal pulses bilaterally  Gastrointestinal: Positive bowel sounds, soft, nontender, nondistended  Musculoskeletal: No bilateral ankle edema, no clubbing or cyanosis to extremities  Psychiatric: Appropriate affect, cooperative  Neurologic: Oriented x 3, strength symmetric in all extremities, Cranial Nerves grossly intact to confrontation, speech clear  Skin: No rashes      Results Reviewed:  I have personally reviewed most recent indicated data and agree with findings including:  [x]  Laboratory  [x]  Radiology  []  EKG/Telemetry  []  Pathology  []  Cardiac/Vascular Studies  [x]  Old records  []  Other:  Most pertinent findings include:low k, bicarb, low phos,       LAB RESULTS:      Lab 07/07/22  1718   WBC 7.72   HEMOGLOBIN 14.9   HEMATOCRIT 42.3   PLATELETS 250   NEUTROS ABS 5.22   IMMATURE GRANS (ABS) 0.04   LYMPHS ABS 1.84   MONOS ABS 0.49   EOS ABS 0.06   MCV 87.0         Lab 07/07/22 2013 07/07/22  1718   SODIUM 134* 131*   POTASSIUM 3.3* 3.9   CHLORIDE 105 99   CO2 9.0* 9.0*   ANION GAP 20.0* 23.0*   BUN 8 10   CREATININE 0.65 0.81   EGFR 126.3 104.1   GLUCOSE 349* 508*   CALCIUM 7.6* 9.2   MAGNESIUM 1.6 1.8   PHOSPHORUS 1.8* 2.4*   HEMOGLOBIN A1C  --  11.80*   TSH  --  5.370*         Lab 07/07/22  1718   TOTAL PROTEIN 8.0   ALBUMIN 4.60   GLOBULIN 3.4   ALT (SGPT) 21   AST (SGOT) 15   BILIRUBIN 0.7   ALK PHOS 97                     Lab 07/07/22  1931   PH, ARTERIAL 7.197*   PCO2, ARTERIAL 16.7*   PO2 .0   FIO2 21   HCO3 ART 6.5*   BASE EXCESS ART -19.3*   CARBOXYHEMOGLOBIN 1.1     Brief Urine Lab Results  (Last result in the past 365 days)      Color   Clarity   Blood   Leuk Est   Nitrite   Protein   CREAT   Urine HCG        07/07/22 1955               Negative           Microbiology Results (last 10 days)     Procedure Component Value - Date/Time    COVID  PRE-OP / PRE-PROCEDURE SCREENING ORDER (NO ISOLATION) - Swab, Nasopharynx [[id number]]  (Normal) Collected: 07/07/22 1934    Lab Status: Final result Specimen: Swab from Nasopharynx Updated: 07/07/22 2004    Narrative:      The following orders were created for panel order COVID PRE-OP / PRE-PROCEDURE SCREENING ORDER (NO ISOLATION) - Swab, Nasopharynx.  Procedure                               Abnormality         Status                     ---------                               -----------         ------                     COVID-19 and FLU A/B PCR...[[id number]]  Normal              Final result                 Please view results for these tests on the individual orders.    COVID-19 and FLU A/B PCR - Swab, Nasopharynx [[id number]]  (Normal) Collected: 07/07/22 1934    Lab Status: Final result Specimen: Swab from Nasopharynx Updated: 07/07/22 2004     COVID19 Not Detected     Influenza A PCR Not Detected     Influenza B PCR Not Detected    Narrative:      Fact sheet for providers: https://www.fda.gov/media/397799/download    Fact sheet for patients: https://www.fda.gov/media/592289/download    Test performed by PCR.          XR Chest 1 View    Result Date: 7/7/2022  DATE OF EXAM: 7/7/2022 7:41 PM  PROCEDURE: XR CHEST 1 VW-  INDICATIONS: dka; E13.10-Other specified diabetes mellitus with ketoacidosis without coma  COMPARISON: No comparisons available.  TECHNIQUE: Single radiographic AP view of the chest was obtained.  FINDINGS: The heart is not definitely enlarged. The lungs seem clear. There are no pleural effusions. The visualized osseous structures do not appear unusual.      Impression: 1.  An acute pulmonary process is not apparent.  This report was finalized on 7/7/2022 7:57 PM by Florencio Ibrahim MD.            Assessment & Plan   Assessment & Plan       Diabetic ketoacidosis without coma associated with other specified diabetes mellitus (HCC)    Obesity (BMI 30-39.9)    22 year old female who presents to the ED  with complaint of 2-3 month history of increased thirst and urination that significantly worsened over the past 2 weeks who is found to have concern for DKA    DKA  -Glucomander protocol initiated   -IVF bolus x 3L given in ED   - q 1 hour fingerstick  -BMP, mg+, phos q 4 hours  -diabetes educator, dietician  -plan to transition to levemir and SS insulin once resolution met  -HgA1c 11.8, will need close follow up care at discharge   -no home medications, did have gestational diabetes that was well controlled without medications 2 years ago   -CBC, BMP in am   -replete K and phos    History of frequent UTI  -UA notes +1 bacteria, no urinary symptoms   -urine cx pending   -monitor off abx for now     History of hypothyroid   -TSH 5.37, free T4 0.99  -continue follow up outpt       DVT prophylaxis:  lovenox     CODE STATUS:    Code Status (Patient has no pulse and is not breathing): CPR (Attempt to Resuscitate)  Medical Interventions (Patient has pulse or is breathing): Full Support      This note has been completed as part of a split-shared workflow.     Signature:  Electronically signed by EVER Morejon, 07/07/22, 9:02 PM EDT.      Attending   Admission Attestation       I have seen and examined the patient, performing an independent face-to-face diagnostic evaluation with plan of care reviewed and developed with the advanced practice clinician (APC).      Brief Summary Statement:     Soledad Quigley is a 22 y.o. female with PMH significant for gestational DM and HTN, anxiety, hypothyroid, obesity, who presents to the ED with complaint of increased thirst and urination as well as SOB. She reports she hasn't felt well for awhile, but worse this last week. She has had polyuria, denies dysuria, fever or chills. She has strong family history of diabetes in her father and grandmother    Remainder of detailed HPI is as noted by APC and has been reviewed and/or edited by me for completeness.    Attending Physical  Exam:  Temp:  [98 °F (36.7 °C)] 98 °F (36.7 °C)  Heart Rate:  [] 95  Resp:  [20] 20  BP: (132-151)/(81-99) 151/93    Patient is alert and talkative in no distress at rest  Neck is without mass or JVD  Heart is Reg wo murmur  Lungs are clear wo wheeze or crackle  Abd is soft without HSM or mass, not tender or distended, obese  MAEW  Skin is without rash  Neurologic is exam in non-focal   Mood is appropriate      Brief Assessment/Plan :  See detailed assessment and plan developed with APC which I have reviewed and/or edited for completeness.    Admission Status: I believe that this patient meets Inpatient  Status.    Kera Rome MD  07/07/22

## 2022-07-08 NOTE — PAYOR COMM NOTE
"Rachel Lester RN   Phone [phone]  Fax [phone]    Che Barrow (22 y.o. Female)             Date of Birth   2000    Social Security Number       Address   [address] [facility] [address]    Home Phone   [phone]    MRN   [id number]       [facility]    Marital Status                               Admission Date   22    Admission Type   Emergency    Admitting Provider   Cookie Hong MD    Attending Provider   Cookie Hong MD    Department, Room/Bed   [facility] 3E, S346/1       Discharge Date       Discharge Disposition       Discharge Destination                               Attending Provider: Cookie Hong MD    Allergies: Sulfa Antibiotics, Bactrim [Sulfamethoxazole-trimethoprim]    Isolation: None   Infection: None   Code Status: CPR   Advance Care Planning Activity    Ht: 162.6 cm (64\")   Wt: 104 kg (230 lb)    Admission Cmt: None   Principal Problem: Diabetic ketoacidosis without coma associated with other specified diabetes mellitus (HCC) [E13.10]                 Active Insurance as of 2022     Primary Coverage     Payor Plan Insurance Group Employer/Plan Group    [employer] [facility] MEDICAID [id number]     Payor Plan Address Payor Plan Phone Number Payor Plan Fax Number Effective Dates    [address] [address] [phone]  2022 - None Entered    [facility] [id number]       Subscriber Name Subscriber Birth Date Member ID       CHE BARROW 2000 [id number]                 Emergency Contacts      (Rel.) Home Phone Work Phone Mobile Phone    ORLANDO ESPANA (Spouse) [phone] --    PUSHPAJOSE JUAN (Father) [phone] -- --               History & Physical      Kera Rome MD at 22              [facility] Medicine Services  HISTORY AND PHYSICAL    Patient Name: Che Barrow  : 2000  MRN: [id number]  Primary Care Physician: Provider, No Known  Date of " admission: 7/7/2022    Subjective   Subjective     Chief Complaint:  Increased thirst, urination, SOB    HPI:  Soledad Quigley is a 22 y.o. female with PMH significant for gestational DM and HTN, anxiety, hypothyroid, obesity, who presents to the ED with complaint of increased thirst and urination as well as SOB.   She states that over the past several months she has noticed that she has had increased thirst with increased urination. Over the past two weeks she states that it has significantly worsened. She denies fever, chest pain, urinary pain, or diarrhea. She does note that she has had intermittent dry cough and SOB that has started today. She denies any edema. She admits that she has not had primary medical since the birth of her child 2 years ago and takes no daily home medications.   Upon arrival to the ED, her BG was 508. She had anion gap of 23. ABG revealed pH 7.197 and beta-hydroxybutyrate 5.648.   She was given IVF bolus x 3 as well as started on glucomander DKA protocol while in the ED.   She will be admitted to Hospital Medicine for further evaluation.     Review of Systems   Constitutional: Positive for fatigue. Negative for activity change, appetite change, chills, diaphoresis, fever and unexpected weight change.   Eyes: Negative.  Negative for visual disturbance.   Respiratory: Positive for cough and shortness of breath. Negative for choking, chest tightness and wheezing.    Cardiovascular: Negative.  Negative for chest pain, palpitations and leg swelling.   Gastrointestinal: Negative.  Negative for abdominal distention, abdominal pain, constipation, diarrhea, nausea and vomiting.   Endocrine: Positive for polydipsia and polyphagia.   Genitourinary: Positive for frequency. Negative for decreased urine volume, difficulty urinating, dysuria, pelvic pain and urgency.   Musculoskeletal: Negative for arthralgias, back pain, gait problem and myalgias.   Skin: Negative.  Negative for color change, pallor,  rash and wound.   Allergic/Immunologic: Negative.  Negative for immunocompromised state.   Neurological: Positive for headaches. Negative for dizziness, seizures, syncope, speech difficulty, weakness, light-headedness and numbness.   Hematological: Negative.  Does not bruise/bleed easily.   Psychiatric/Behavioral: Negative.  Negative for confusion. The patient is not nervous/anxious.         All other systems reviewed and are negative.     Personal History     Past Medical History:   Diagnosis Date   • Anemia    • Gestational diabetes    • Gestational hypertension, third trimester    • Hypothyroid    • Obesity (BMI 35.0-39.9 without comorbidity)    • PCOS (polycystic ovarian syndrome)        Past Surgical History:   Procedure Laterality Date   •  SECTION         Family History:  family history includes Breast cancer in her mother; Diabetes in her father; Diabetes type I in her paternal grandmother; Hyperlipidemia in her father. Otherwise pertinent FHx was reviewed and unremarkable.     Social History:  reports that she has never smoked. She has never used smokeless tobacco. She reports that she does not drink alcohol and does not use drugs.      Medications:       Allergies   Allergen Reactions   • Sulfa Antibiotics Diarrhea, Nausea And Vomiting and GI Intolerance   • Bactrim [Sulfamethoxazole-Trimethoprim] Diarrhea, Nausea And Vomiting and GI Intolerance       Objective   Objective     Vital Signs:   Temp:  [98 °F (36.7 °C)] 98 °F (36.7 °C)  Heart Rate:  [] 95  Resp:  [20] 20  BP: (132-151)/(81-99) 151/93    Physical Exam   Constitutional: Awake, alert  Eyes: PERRLA, sclerae anicteric, no conjunctival injection  HENT: NCAT, mucous membranes moist  Neck: Supple, no thyromegaly, no lymphadenopathy, trachea midline  Respiratory: Clear to auscultation bilaterally, nonlabored respirations   Cardiovascular: RRR, no murmurs, rubs, or gallops, palpable pedal pulses bilaterally  Gastrointestinal: Positive  bowel sounds, soft, nontender, nondistended  Musculoskeletal: No bilateral ankle edema, no clubbing or cyanosis to extremities  Psychiatric: Appropriate affect, cooperative  Neurologic: Oriented x 3, strength symmetric in all extremities, Cranial Nerves grossly intact to confrontation, speech clear  Skin: No rashes      Results Reviewed:  I have personally reviewed most recent indicated data and agree with findings including:  [x]  Laboratory  [x]  Radiology  []  EKG/Telemetry  []  Pathology  []  Cardiac/Vascular Studies  [x]  Old records  []  Other:  Most pertinent findings include:low k, bicarb, low phos,       LAB RESULTS:      Lab 07/07/22  1718   WBC 7.72   HEMOGLOBIN 14.9   HEMATOCRIT 42.3   PLATELETS 250   NEUTROS ABS 5.22   IMMATURE GRANS (ABS) 0.04   LYMPHS ABS 1.84   MONOS ABS 0.49   EOS ABS 0.06   MCV 87.0         Lab 07/07/22 2013 07/07/22 1718   SODIUM 134* 131*   POTASSIUM 3.3* 3.9   CHLORIDE 105 99   CO2 9.0* 9.0*   ANION GAP 20.0* 23.0*   BUN 8 10   CREATININE 0.65 0.81   EGFR 126.3 104.1   GLUCOSE 349* 508*   CALCIUM 7.6* 9.2   MAGNESIUM 1.6 1.8   PHOSPHORUS 1.8* 2.4*   HEMOGLOBIN A1C  --  11.80*   TSH  --  5.370*         Lab 07/07/22 1718   TOTAL PROTEIN 8.0   ALBUMIN 4.60   GLOBULIN 3.4   ALT (SGPT) 21   AST (SGOT) 15   BILIRUBIN 0.7   ALK PHOS 97                     Lab 07/07/22 1931   PH, ARTERIAL 7.197*   PCO2, ARTERIAL 16.7*   PO2 .0   FIO2 21   HCO3 ART 6.5*   BASE EXCESS ART -19.3*   CARBOXYHEMOGLOBIN 1.1     Brief Urine Lab Results  (Last result in the past 365 days)      Color   Clarity   Blood   Leuk Est   Nitrite   Protein   CREAT   Urine HCG        07/07/22 1955               Negative           Microbiology Results (last 10 days)     Procedure Component Value - Date/Time    COVID PRE-OP / PRE-PROCEDURE SCREENING ORDER (NO ISOLATION) - Swab, Nasopharynx [[id number]]  (Normal) Collected: 07/07/22 1934    Lab Status: Final result Specimen: Swab from Nasopharynx Updated: 07/07/22  2004    Narrative:      The following orders were created for panel order COVID PRE-OP / PRE-PROCEDURE SCREENING ORDER (NO ISOLATION) - Swab, Nasopharynx.  Procedure                               Abnormality         Status                     ---------                               -----------         ------                     COVID-19 and FLU A/B PCR...[[id number]]  Normal              Final result                 Please view results for these tests on the individual orders.    COVID-19 and FLU A/B PCR - Swab, Nasopharynx [[id number]]  (Normal) Collected: 07/07/22 1934    Lab Status: Final result Specimen: Swab from Nasopharynx Updated: 07/07/22 2004     COVID19 Not Detected     Influenza A PCR Not Detected     Influenza B PCR Not Detected    Narrative:      Fact sheet for providers: https://www.fda.gov/media/001516/download    Fact sheet for patients: https://www.fda.gov/media/426419/download    Test performed by PCR.          XR Chest 1 View    Result Date: 7/7/2022  DATE OF EXAM: 7/7/2022 7:41 PM  PROCEDURE: XR CHEST 1 VW-  INDICATIONS: dka; E13.10-Other specified diabetes mellitus with ketoacidosis without coma  COMPARISON: No comparisons available.  TECHNIQUE: Single radiographic AP view of the chest was obtained.  FINDINGS: The heart is not definitely enlarged. The lungs seem clear. There are no pleural effusions. The visualized osseous structures do not appear unusual.      Impression: 1.  An acute pulmonary process is not apparent.  This report was finalized on 7/7/2022 7:57 PM by Florencio Ibrahim MD.            Assessment & Plan   Assessment & Plan       Diabetic ketoacidosis without coma associated with other specified diabetes mellitus (HCC)    Obesity (BMI 30-39.9)    22 year old female who presents to the ED with complaint of 2-3 month history of increased thirst and urination that significantly worsened over the past 2 weeks who is found to have concern for DKA    DKA  -Glucomander protocol initiated    -IVF bolus x 3L given in ED   - q 1 hour fingerstick  -BMP, mg+, phos q 4 hours  -diabetes educator, dietician  -plan to transition to levemir and SS insulin once resolution met  -HgA1c 11.8, will need close follow up care at discharge   -no home medications, did have gestational diabetes that was well controlled without medications 2 years ago   -CBC, BMP in am   -replete K and phos    History of frequent UTI  -UA notes +1 bacteria, no urinary symptoms   -urine cx pending   -monitor off abx for now     History of hypothyroid   -TSH 5.37, free T4 0.99  -continue follow up outpt       DVT prophylaxis:  lovenox     CODE STATUS:    Code Status (Patient has no pulse and is not breathing): CPR (Attempt to Resuscitate)  Medical Interventions (Patient has pulse or is breathing): Full Support      This note has been completed as part of a split-shared workflow.     Signature:  Electronically signed by EVER Morejon, 07/07/22, 9:02 PM EDT.      Attending   Admission Attestation       I have seen and examined the patient, performing an independent face-to-face diagnostic evaluation with plan of care reviewed and developed with the advanced practice clinician (APC).      Brief Summary Statement:     Soledad Quigley is a 22 y.o. female with PMH significant for gestational DM and HTN, anxiety, hypothyroid, obesity, who presents to the ED with complaint of increased thirst and urination as well as SOB. She reports she hasn't felt well for awhile, but worse this last week. She has had polyuria, denies dysuria, fever or chills. She has strong family history of diabetes in her father and grandmother    Remainder of detailed HPI is as noted by APC and has been reviewed and/or edited by me for completeness.    Attending Physical Exam:  Temp:  [98 °F (36.7 °C)] 98 °F (36.7 °C)  Heart Rate:  [] 95  Resp:  [20] 20  BP: (132-151)/(81-99) 151/93    Patient is alert and talkative in no distress at rest  Neck is without mass or  JVD  Heart is Reg wo murmur  Lungs are clear wo wheeze or crackle  Abd is soft without HSM or mass, not tender or distended, obese  MAEW  Skin is without rash  Neurologic is exam in non-focal   Mood is appropriate      Brief Assessment/Plan :  See detailed assessment and plan developed with APC which I have reviewed and/or edited for completeness.    Admission Status: I believe that this patient meets Inpatient  Status.    Kera Rome MD  07/07/22                            Electronically signed by Kera Rome MD at 07/08/22 0130       Emergency Department Notes    No notes of this type exist for this encounter.         Vital Signs (last day)     Date/Time Temp Temp src Pulse Resp BP Patient Position SpO2    07/08/22 0745 97.8 (36.6) Oral 87 16 129/87 Lying 98    07/08/22 0345 98.4 (36.9) Oral 84 16 125/71 Lying 95    07/07/22 2315 98.1 (36.7) Oral 92 16 124/79 Lying 97    07/07/22 2050 -- -- 93 20 135/81 Lying 98    07/07/22 19:35:34 -- -- 95 -- 151/93 Lying 97    07/07/22 1830 -- -- 90 -- 132/81 -- 100    07/07/22 1820 -- -- 96 -- 138/86 -- 99    07/07/22 1640 98 (36.7) Oral 102 20 142/99 Sitting 100            Facility-Administered Medications as of 7/8/2022   Medication Dose Route Frequency Provider Last Rate Last Admin   • acetaminophen (TYLENOL) tablet 650 mg  650 mg Oral Q4H PRN Lori Adhikari APRN        Or   • acetaminophen (TYLENOL) 160 MG/5ML solution 650 mg  650 mg Oral Q4H PRN Lori Adhikari APRN        Or   • acetaminophen (TYLENOL) suppository 650 mg  650 mg Rectal Q4H PRN Lori Adhikari APRN       • dextrose (D50W) (25 g/50 mL) IV injection 10-50 mL  10-50 mL Intravenous Q15 Min PRN Daren Cid MD       • dextrose (GLUTOSE) oral gel 15 g  15 g Oral Q15 Min PRN Daren Cid MD       • dextrose 5 % and sodium chloride 0.45 % infusion  150 mL/hr Intravenous Continuous PRN Daren Cid MD       • dextrose 5 % and sodium chloride 0.45 % with KCl 20 mEq/L infusion  150  mL/hr Intravenous Continuous PRN Daren Cid  mL/hr at 07/08/22 0356 150 mL/hr at 07/08/22 0356   • dextrose 5 % and sodium chloride 0.45 % with KCl 40 mEq/L infusion  150 mL/hr Intravenous Continuous PRN Daren Cid MD       • dextrose 5 % and sodium chloride 0.9 % infusion  150 mL/hr Intravenous Continuous PRN Daren Cid MD       • dextrose 5 % and sodium chloride 0.9 % with KCl 20 mEq/L infusion  150 mL/hr Intravenous Continuous PRN Daren Cid  mL/hr at 07/08/22 0044 150 mL/hr at 07/08/22 0044   • dextrose 5 % and sodium chloride 0.9 % with KCl 40 mEq/L infusion  150 mL/hr Intravenous Continuous PRN Daren Cid MD       • Enoxaparin Sodium (LOVENOX) syringe 40 mg  40 mg Subcutaneous Daily Lori Adhikari APRN       • glucagon (human recombinant) (GLUCAGEN DIAGNOSTIC) injection 1 mg  1 mg Intramuscular Q15 Min PRN Daren Cid MD       • insulin regular 1 unit/mL in 0.9% sodium chloride  0-100 Units/hr Intravenous Titrated Daren Cid MD 5.3 mL/hr at 07/08/22 1024 5.3 Units/hr at 07/08/22 1024   • Magnesium Sulfate 2 gram Bolus, followed by 8 gram infusion (total Mg dose 10 grams)- Mg less than or equal to 1mg/dL  2 g Intravenous PRN Stcaie Montana APRN        Or   • Magnesium Sulfate 2 gram / 50mL Infusion (GIVE X 3 BAGS TO EQUAL 6GM TOTAL DOSE) - Mg 1.1 - 1.5 mg/dl  2 g Intravenous PRN Stacie Montana APRN        Or   • Magnesium Sulfate 4 gram infusion- Mg 1.6-1.9 mg/dL  4 g Intravenous PRN Stacie Montana APRN 25 mL/hr at 07/07/22 2152 4 g at 07/07/22 2152   • potassium & sodium phosphates (PHOS-NAK) 280-160-250 MG packet - for Phosphorus less than 1.25 mg/dL  2 packet Oral Q6H PRN Stacie Montana APRN        Or   • potassium & sodium phosphates (PHOS-NAK) 280-160-250 MG packet - for Phosphorus 1.25 - 2.5 mg/dL  2 packet Oral Q6H PRN Stacie Montana, APRN   2 packet at 07/08/22 0455   • potassium chloride (MICRO-K) CR capsule 40 mEq  40 mEq Oral PRN Rubén  EVER Washburn   40 mEq at 22 0208    Or   • potassium chloride (KLOR-CON) packet 40 mEq  40 mEq Oral PRN Stacie Montana APRN        Or   • potassium chloride 10 mEq in 100 mL IVPB  10 mEq Intravenous Q1H PRN Stacie Montana APRN       • [COMPLETED] sodium chloride 0.9 % bolus 1,000 mL  1,000 mL Intravenous Once Daren Cid MD   Stopped at 22 192   • [COMPLETED] sodium chloride 0.9 % bolus 1,000 mL  1,000 mL Intravenous Once Daren Cid MD 2,000 mL/hr at 22 193 1,000 mL at 22 193   • sodium chloride 0.9 % bolus 1,000 mL  1,000 mL Intravenous Once Yves Austin MD   Stopped at 22   • [] sodium chloride 0.9 % bolus  1,000 mL/hr Intravenous Continuous Daren Cid MD       • sodium chloride 0.9 % flush 10 mL  10 mL Intravenous Q12H Daren Cid MD   10 mL at 22 0916   • sodium chloride 0.9 % flush 10 mL  10 mL Intravenous PRN Daren Cid MD       • sodium chloride 0.9 % flush 10 mL  10 mL Intravenous Q12H Lori Adhikari APRN   10 mL at 22 0916   • sodium chloride 0.9 % flush 10 mL  10 mL Intravenous PRN Lori Adhikari APRN         ECG/EMG Results (last 24 hours)     Procedure Component Value Units Date/Time    ECG 12 Lead [[id number]] Collected: 22     Updated: 22

## 2022-07-08 NOTE — CONSULTS
Clinical Nutrition     Nutrition Education   Reason for Visit:   Physician Consult     Patient Name: Soledad Quigley  YOB: 2000  MRN: [id number]  Date of Encounter: 07/08/22 15:26 EDT  Admission date: 7/7/2022       Assessment     Applicable diagnosis:  Patient Active Problem List   Diagnosis   • Diabetic ketoacidosis without coma associated with other specified diabetes mellitus (HCC)   • Obesity (BMI 30-39.9)       Reported/Observed/Food/Nutrition Related History:     Pt with newly dx DM. She states has multiple close family members with DM and had Gestational DM while pregnant ~2 years ago. She states was able to control her GDM with diet alone so is experiences with [facility] diet. She states does avoid high GI foods and beverages as well. In depth verbal/written diet education/counseling provided on the importance of a carbohydrate consistent diet, balancing meals with fiber and protein rich foods, exercise, hydration, and managing sleep/stress. Education also provided on the risks of consuming high glycemic index foods/beverages and not managing BG/working to slow progression of DM as well as for how to manage PO intake/BG while acutely ill. Pt voiced understanding and ability to apply information. She denied further dietary needs while here in the hospital.     Labs reviewed     Results from last 7 days   Lab Units 07/08/22  1357 07/08/22  0826 07/08/22  0335 07/07/22 2013 07/07/22  1718   GLUCOSE mg/dL 139* 143* 269*   < > 508*   BUN mg/dL 4* 5* 5*   < > 10   CREATININE mg/dL 0.35* 0.43* 0.42*   < > 0.81   SODIUM mmol/L 136 138 130*   < > 131*   CHLORIDE mmol/L 112* 112* 105   < > 99   POTASSIUM mmol/L 3.6 3.3* 3.6   < > 3.9   PHOSPHORUS mg/dL 1.4* 1.3* 1.8*   < > 2.4*   MAGNESIUM mg/dL 2.1 2.0 2.5   < > 1.8   ALT (SGPT) U/L  --   --   --   --  21    < > = values in this interval not displayed.     Results from last 7 days   Lab Units 07/07/22  1718   ALBUMIN g/dL 4.60          Results from last 7 days   Lab Units 07/08/22  1455 07/08/22  1343 07/08/22  1240 07/08/22  1123 07/08/22  1015 07/08/22  0849   GLUCOSE mg/dL 126 138* 112 141* 149* 146*     Lab Results   Lab Value Date/Time    HGBA1C 11.80 (H) 07/07/2022 1718            Nutrition Diagnosis   Date:   Updated:   Problem Food and nutrition knowledge deficit   Etiology Newly dx DM   Signs/Symptoms A1C 11.8, lack of knowledge of diet appropriate for condtion   Status:  Nutrition Intervention     • Nutrition Education provided regarding: Diet rationale, Key food habit change, Consistent Carbohydrate Diet, Type 2 Diabetes and Healthy eating for diabetes  • RD provided printed material via [employer]-Nutrition Care Manual  and [facility] RDN created education material  • Pt acknowledged understanding of material covered    Goal:     Increase knowledge on diet/nutrition effects on condition/status     Monitoring/Evaluation:     RD to follow up PRN    Outpatient nutrition education referral completed       Merna Juarez RD  Time Spent: 40

## 2022-07-09 LAB
ANION GAP SERPL CALCULATED.3IONS-SCNC: 12 MMOL/L (ref 5–15)
ARTERIAL PATENCY WRIST A: ABNORMAL
ATMOSPHERIC PRESS: ABNORMAL MM[HG]
BACTERIA SPEC AEROBE CULT: NORMAL
BASE EXCESS BLDA CALC-SCNC: -5.9 MMOL/L (ref 0–2)
BDY SITE: ABNORMAL
BODY TEMPERATURE: 37 C
BUN SERPL-MCNC: 2 MG/DL (ref 6–20)
BUN/CREAT SERPL: 4.9 (ref 7–25)
CALCIUM SPEC-SCNC: 8.2 MG/DL (ref 8.6–10.5)
CHLORIDE SERPL-SCNC: 111 MMOL/L (ref 98–107)
CO2 BLDA-SCNC: 19.2 MMOL/L (ref 22–33)
CO2 SERPL-SCNC: 15 MMOL/L (ref 22–29)
COHGB MFR BLD: 1.4 % (ref 0–2)
CREAT SERPL-MCNC: 0.41 MG/DL (ref 0.57–1)
DEPRECATED RDW RBC AUTO: 46.5 FL (ref 37–54)
EGFRCR SERPLBLD CKD-EPI 2021: 141.1 ML/MIN/1.73
EPAP: 0
ERYTHROCYTE [DISTWIDTH] IN BLOOD BY AUTOMATED COUNT: 14.3 % (ref 12.3–15.4)
GLUCOSE BLDC GLUCOMTR-MCNC: 163 MG/DL (ref 70–130)
GLUCOSE BLDC GLUCOMTR-MCNC: 166 MG/DL (ref 70–130)
GLUCOSE BLDC GLUCOMTR-MCNC: 199 MG/DL (ref 70–130)
GLUCOSE BLDC GLUCOMTR-MCNC: 199 MG/DL (ref 70–130)
GLUCOSE BLDC GLUCOMTR-MCNC: 236 MG/DL (ref 70–130)
GLUCOSE BLDC GLUCOMTR-MCNC: 251 MG/DL (ref 70–130)
GLUCOSE SERPL-MCNC: 183 MG/DL (ref 65–99)
HCO3 BLDA-SCNC: 18.3 MMOL/L (ref 20–26)
HCT VFR BLD AUTO: 36.5 % (ref 34–46.6)
HCT VFR BLD CALC: 38.6 % (ref 38–51)
HGB BLD-MCNC: 12.5 G/DL (ref 12–15.9)
HGB BLDA-MCNC: 12.6 G/DL (ref 14–18)
INHALED O2 CONCENTRATION: 21 %
IPAP: 0
MAGNESIUM SERPL-MCNC: 1.9 MG/DL (ref 1.6–2.6)
MCH RBC QN AUTO: 30.5 PG (ref 26.6–33)
MCHC RBC AUTO-ENTMCNC: 34.2 G/DL (ref 31.5–35.7)
MCV RBC AUTO: 89 FL (ref 79–97)
METHGB BLD QL: 0.2 % (ref 0–1.5)
MODALITY: ABNORMAL
NOTE: ABNORMAL
OXYHGB MFR BLDV: 96.3 % (ref 94–99)
PAW @ PEAK INSP FLOW SETTING VENT: 0 CMH2O
PCO2 BLDA: 31.2 MM HG (ref 35–45)
PCO2 TEMP ADJ BLD: 31.2 MM HG (ref 35–45)
PH BLDA: 7.38 PH UNITS (ref 7.35–7.45)
PH, TEMP CORRECTED: 7.38 PH UNITS
PHOSPHATE SERPL-MCNC: 3.6 MG/DL (ref 2.5–4.5)
PLATELET # BLD AUTO: 231 10*3/MM3 (ref 140–450)
PMV BLD AUTO: 9.7 FL (ref 6–12)
PO2 BLDA: 85 MM HG (ref 83–108)
PO2 TEMP ADJ BLD: 85 MM HG (ref 83–108)
POTASSIUM SERPL-SCNC: 3.5 MMOL/L (ref 3.5–5.2)
RBC # BLD AUTO: 4.1 10*6/MM3 (ref 3.77–5.28)
SODIUM SERPL-SCNC: 138 MMOL/L (ref 136–145)
TOTAL RATE: 0 BREATHS/MINUTE
WBC NRBC COR # BLD: 6.59 10*3/MM3 (ref 3.4–10.8)

## 2022-07-09 PROCEDURE — 82375 ASSAY CARBOXYHB QUANT: CPT

## 2022-07-09 PROCEDURE — 80048 BASIC METABOLIC PNL TOTAL CA: CPT | Performed by: INTERNAL MEDICINE

## 2022-07-09 PROCEDURE — 63710000001 INSULIN DETEMIR PER 5 UNITS: Performed by: INTERNAL MEDICINE

## 2022-07-09 PROCEDURE — 82962 GLUCOSE BLOOD TEST: CPT

## 2022-07-09 PROCEDURE — 83050 HGB METHEMOGLOBIN QUAN: CPT

## 2022-07-09 PROCEDURE — 25010000002 ENOXAPARIN PER 10 MG: Performed by: NURSE PRACTITIONER

## 2022-07-09 PROCEDURE — 82805 BLOOD GASES W/O2 SATURATION: CPT

## 2022-07-09 PROCEDURE — 85027 COMPLETE CBC AUTOMATED: CPT | Performed by: INTERNAL MEDICINE

## 2022-07-09 PROCEDURE — 99233 SBSQ HOSP IP/OBS HIGH 50: CPT | Performed by: INTERNAL MEDICINE

## 2022-07-09 PROCEDURE — 63710000001 INSULIN LISPRO (HUMAN) PER 5 UNITS: Performed by: NURSE PRACTITIONER

## 2022-07-09 PROCEDURE — 83735 ASSAY OF MAGNESIUM: CPT | Performed by: INTERNAL MEDICINE

## 2022-07-09 PROCEDURE — 36600 WITHDRAWAL OF ARTERIAL BLOOD: CPT

## 2022-07-09 PROCEDURE — 84100 ASSAY OF PHOSPHORUS: CPT | Performed by: INTERNAL MEDICINE

## 2022-07-09 RX ADMIN — INSULIN LISPRO 2 UNITS: 100 INJECTION, SOLUTION INTRAVENOUS; SUBCUTANEOUS at 04:25

## 2022-07-09 RX ADMIN — ENOXAPARIN SODIUM 40 MG: 40 INJECTION SUBCUTANEOUS at 08:50

## 2022-07-09 RX ADMIN — INSULIN LISPRO 2 UNITS: 100 INJECTION, SOLUTION INTRAVENOUS; SUBCUTANEOUS at 00:44

## 2022-07-09 RX ADMIN — INSULIN LISPRO 3 UNITS: 100 INJECTION, SOLUTION INTRAVENOUS; SUBCUTANEOUS at 19:33

## 2022-07-09 RX ADMIN — INSULIN LISPRO 2 UNITS: 100 INJECTION, SOLUTION INTRAVENOUS; SUBCUTANEOUS at 17:15

## 2022-07-09 RX ADMIN — INSULIN LISPRO 4 UNITS: 100 INJECTION, SOLUTION INTRAVENOUS; SUBCUTANEOUS at 12:36

## 2022-07-09 RX ADMIN — INSULIN DETEMIR 22 UNITS: 100 INJECTION, SOLUTION SUBCUTANEOUS at 20:58

## 2022-07-09 RX ADMIN — SODIUM BICARBONATE 650 MG TABLET 650 MG: at 20:58

## 2022-07-09 RX ADMIN — SODIUM BICARBONATE 650 MG TABLET 650 MG: at 08:50

## 2022-07-09 RX ADMIN — INSULIN LISPRO 2 UNITS: 100 INJECTION, SOLUTION INTRAVENOUS; SUBCUTANEOUS at 08:50

## 2022-07-09 RX ADMIN — Medication 10 ML: at 22:53

## 2022-07-09 NOTE — PROGRESS NOTES
[facility] Medicine Services  PROGRESS NOTE    Patient Name: Soledad Quigley  : 2000  MRN: [id number]    Date of Admission: 2022  Primary Care Physician: Tawnya Lynne APRN    Subjective   Subjective     CC:  Severe fatigue, polyuria, nausea    HPI: feels better, tolerating clears, no nausea, improved energy    ROS:  Gen- No fevers, chills, still a bit tired   CV- No chest pain, palpitations  Resp- No cough, dyspnea  GI- No N/V/D, abd pain         Objective   Objective     Vital Signs:   Temp:  [97.6 °F (36.4 °C)-98.8 °F (37.1 °C)] 98.1 °F (36.7 °C)  Heart Rate:  [] 78  Resp:  [16-20] 20  BP: (113-136)/(85-92) 130/87     Physical Exam:  Constitutional: Awake, alert in bed,  present   Eyes: PERRLA, sclerae anicteric, no conjunctival injection  HENT: NCAT, mucous membranes moist  Neck: Supple, trachea midline  Respiratory: Clear to auscultation bilaterally, nonlabored respirations   Cardiovascular: RRR, no murmurs  Gastrointestinal: Positive bowel sounds, soft, nontender, nondistended  Musculoskeletal: No bilateral ankle edema, no clubbing or cyanosis to extremities  Psychiatric: Appropriate affect, cooperative  Neurologic: Oriented x 3, strength symmetric in all extremities, Cranial Nerves grossly intact to confrontation, speech clear  Skin: No rashes      Results Reviewed:  LAB RESULTS:      Lab 22  0531 22  1718   WBC 6.59 7.72   HEMOGLOBIN 12.5 14.9   HEMATOCRIT 36.5 42.3   PLATELETS 231 250   NEUTROS ABS  --  5.22   IMMATURE GRANS (ABS)  --  0.04   LYMPHS ABS  --  1.84   MONOS ABS  --  0.49   EOS ABS  --  0.06   MCV 89.0 87.0         Lab 22  1855 22  1357 22  0826 22  0335 22  2358 22  2013 22  1718   SODIUM 135* 136 138 130* 135*   < > 131*   POTASSIUM 3.1* 3.6 3.3* 3.6 3.6   < > 3.9   CHLORIDE 109* 112* 112* 105 107   < > 99   CO2 15.0* 14.0* 12.0* 10.0* 10.0*   < > 9.0*   ANION GAP 11.0 10.0 14.0 15.0 18.0*    < > 23.0*   BUN 3* 4* 5* 5* 6   < > 10   CREATININE 0.36* 0.35* 0.43* 0.42* 0.58   < > 0.81   EGFR 145.6 146.6 139.5 140.3 129.8   < > 104.1   GLUCOSE 144* 139* 143* 269* 262*   < > 508*   CALCIUM 8.0* 7.3* 7.2* 7.5* 7.9*   < > 9.2   MAGNESIUM 2.0 2.1 2.0 2.5 2.3   < > 1.8   PHOSPHORUS 1.1* 1.4* 1.3* 1.8* 1.8*   < > 2.4*   HEMOGLOBIN A1C  --   --   --   --   --   --  11.80*   TSH  --   --   --   --   --   --  5.370*    < > = values in this interval not displayed.         Lab 07/07/22  1718   TOTAL PROTEIN 8.0   ALBUMIN 4.60   GLOBULIN 3.4   ALT (SGPT) 21   AST (SGOT) 15   BILIRUBIN 0.7   ALK PHOS 97                     Lab 07/07/22 1931   PH, ARTERIAL 7.197*   PCO2, ARTERIAL 16.7*   PO2 .0   FIO2 21   HCO3 ART 6.5*   BASE EXCESS ART -19.3*   CARBOXYHEMOGLOBIN 1.1     Brief Urine Lab Results  (Last result in the past 365 days)      Color   Clarity   Blood   Leuk Est   Nitrite   Protein   CREAT   Urine HCG        07/07/22 1955               Negative             Microbiology Results Abnormal     Procedure Component Value - Date/Time    Urine Culture - Urine, Urine, Clean Catch [[id number]]  (Normal) Collected: 07/07/22 1920    Lab Status: Preliminary result Specimen: Urine, Clean Catch Updated: 07/08/22 1142     Urine Culture Growth present, too young to evaluate    COVID PRE-OP / PRE-PROCEDURE SCREENING ORDER (NO ISOLATION) - Swab, Nasopharynx [[id number]]  (Normal) Collected: 07/07/22 1934    Lab Status: Final result Specimen: Swab from Nasopharynx Updated: 07/07/22 2004    Narrative:      The following orders were created for panel order COVID PRE-OP / PRE-PROCEDURE SCREENING ORDER (NO ISOLATION) - Swab, Nasopharynx.  Procedure                               Abnormality         Status                     ---------                               -----------         ------                     COVID-19 and FLU A/B PCR...[[id number]]  Normal              Final result                 Please view results for these  tests on the individual orders.    COVID-19 and FLU A/B PCR - Swab, Nasopharynx [[id number]]  (Normal) Collected: 07/07/22 1934    Lab Status: Final result Specimen: Swab from Nasopharynx Updated: 07/07/22 2004     COVID19 Not Detected     Influenza A PCR Not Detected     Influenza B PCR Not Detected    Narrative:      Fact sheet for providers: https://www.fda.gov/media/884997/download    Fact sheet for patients: https://www.fda.gov/media/027023/download    Test performed by PCR.          XR Chest 1 View    Result Date: 7/7/2022  DATE OF EXAM: 7/7/2022 7:41 PM  PROCEDURE: XR CHEST 1 VW-  INDICATIONS: dka; E13.10-Other specified diabetes mellitus with ketoacidosis without coma  COMPARISON: No comparisons available.  TECHNIQUE: Single radiographic AP view of the chest was obtained.  FINDINGS: The heart is not definitely enlarged. The lungs seem clear. There are no pleural effusions. The visualized osseous structures do not appear unusual.      Impression: 1.  An acute pulmonary process is not apparent.  This report was finalized on 7/7/2022 7:57 PM by Florencio Ibrahim MD.            I have reviewed the medications:  Scheduled Meds:enoxaparin, 40 mg, Subcutaneous, Daily  insulin detemir, 15 Units, Subcutaneous, Nightly  insulin lispro, 0-7 Units, Subcutaneous, Q4H  sodium bicarbonate, 650 mg, Oral, BID  sodium chloride, 10 mL, Intravenous, Q12H      Continuous Infusions:sodium chloride 0.9 % with KCl 20 mEq, 100 mL/hr, Last Rate: 100 mL/hr (07/08/22 2119)      PRN Meds:.•  acetaminophen **OR** acetaminophen **OR** acetaminophen  •  dextrose  •  dextrose  •  glucagon (human recombinant)  •  magnesium sulfate **OR** magnesium sulfate **OR** magnesium sulfate  •  potassium & sodium phosphates **OR** potassium & sodium phosphates  •  potassium chloride **OR** potassium chloride **OR** potassium chloride  •  potassium phosphate infusion greater than 15 mMoles **OR** potassium phosphate infusion greater than 15 mMoles **OR**  potassium phosphate **OR** sodium phosphate IVPB **OR** sodium phosphate IVPB **OR** sodium phosphate IVPB    Assessment & Plan   Assessment & Plan     Active Hospital Problems    Diagnosis  POA   • **Diabetic ketoacidosis without coma associated with other specified diabetes mellitus (HCC) [E13.10]  Yes   • Obesity (BMI 30-39.9) [E66.9]  Yes      Resolved Hospital Problems   No resolved problems to display.        Brief Hospital Course to date:  Soledad Quigley is a 22 y.o. female with PMH significant for gestational DM and HTN, anxiety, hypothyroid, obesity.  She came to ED with months of polyuria and polydipsia, worse in the past two weeks.  She takes no home meds and does not have a PCP.  In the ED, her glucose was 508 and anion gap 23.  Ph was 7.2      DKA  New diagnosis of diabetes  - A1C 11.8  - insulin gtt changed to subcutan  Insulin; increase levemir; adv diet to consistent carb.   - AG has closed but it is unclear why serum bicarb remains low  - ABG repeatd; shows apparent ongoing mild met acidosis with resp compensation; however the values are not internally consistent.  - check BMP and lytes now and in AM.  Check beta-hydroxybutyrate   - plan on referral to a [place] endocrinologist at [place].  Getting DM teaching here.   - plan to [place] on either levemir QHS or 70/30 BID depending on postprandials     MO - amenable to improving her diet       Expected Discharge Location and Transportation: home  Expected Discharge Date: 1-2 days    DVT prophylaxis:  Medical DVT prophylaxis orders are present.          CODE STATUS:   Code Status and Medical Interventions:   Ordered at: 07/07/22 2579     Code Status (Patient has no pulse and is not breathing):    CPR (Attempt to Resuscitate)     Medical Interventions (Patient has pulse or is breathing):    Full Support       Cookie Hong MD  07/09/22

## 2022-07-09 NOTE — PLAN OF CARE
Goal Outcome Evaluation:        Pts VSS, no c/o pain. Receptive to teaching on insulin and DM management, administering own insulin shots throughout day, good teach back, strong understanding of new diagnosis.  remained at bedside, no other complaints at this time. Will continue to monitor.

## 2022-07-10 LAB
ANION GAP SERPL CALCULATED.3IONS-SCNC: 11 MMOL/L (ref 5–15)
B-OH-BUTYR SERPL-SCNC: 1.59 MMOL/L (ref 0.02–0.27)
BUN SERPL-MCNC: 4 MG/DL (ref 6–20)
BUN/CREAT SERPL: 11.8 (ref 7–25)
CALCIUM SPEC-SCNC: 8.4 MG/DL (ref 8.6–10.5)
CHLORIDE SERPL-SCNC: 105 MMOL/L (ref 98–107)
CO2 SERPL-SCNC: 24 MMOL/L (ref 22–29)
CREAT SERPL-MCNC: 0.34 MG/DL (ref 0.57–1)
EGFRCR SERPLBLD CKD-EPI 2021: 147.6 ML/MIN/1.73
GLUCOSE BLDC GLUCOMTR-MCNC: 180 MG/DL (ref 70–130)
GLUCOSE BLDC GLUCOMTR-MCNC: 181 MG/DL (ref 70–130)
GLUCOSE BLDC GLUCOMTR-MCNC: 194 MG/DL (ref 70–130)
GLUCOSE BLDC GLUCOMTR-MCNC: 214 MG/DL (ref 70–130)
GLUCOSE BLDC GLUCOMTR-MCNC: 245 MG/DL (ref 70–130)
GLUCOSE BLDC GLUCOMTR-MCNC: 255 MG/DL (ref 70–130)
GLUCOSE SERPL-MCNC: 252 MG/DL (ref 65–99)
MAGNESIUM SERPL-MCNC: 1.7 MG/DL (ref 1.6–2.6)
PHOSPHATE SERPL-MCNC: 3.1 MG/DL (ref 2.5–4.5)
POTASSIUM SERPL-SCNC: 3.2 MMOL/L (ref 3.5–5.2)
SODIUM SERPL-SCNC: 140 MMOL/L (ref 136–145)

## 2022-07-10 PROCEDURE — 0 MAGNESIUM SULFATE 4 GM/100ML SOLUTION: Performed by: NURSE PRACTITIONER

## 2022-07-10 PROCEDURE — 84100 ASSAY OF PHOSPHORUS: CPT | Performed by: INTERNAL MEDICINE

## 2022-07-10 PROCEDURE — 25010000002 ENOXAPARIN PER 10 MG: Performed by: NURSE PRACTITIONER

## 2022-07-10 PROCEDURE — 63710000001 INSULIN DETEMIR PER 5 UNITS: Performed by: INTERNAL MEDICINE

## 2022-07-10 PROCEDURE — 63710000001 INSULIN LISPRO (HUMAN) PER 5 UNITS: Performed by: NURSE PRACTITIONER

## 2022-07-10 PROCEDURE — 0 POTASSIUM CHLORIDE PER 2 MEQ: Performed by: INTERNAL MEDICINE

## 2022-07-10 PROCEDURE — 99232 SBSQ HOSP IP/OBS MODERATE 35: CPT | Performed by: INTERNAL MEDICINE

## 2022-07-10 PROCEDURE — 63710000001 INSULIN LISPRO (HUMAN) PER 5 UNITS: Performed by: INTERNAL MEDICINE

## 2022-07-10 PROCEDURE — 82010 KETONE BODYS QUAN: CPT | Performed by: INTERNAL MEDICINE

## 2022-07-10 PROCEDURE — 82962 GLUCOSE BLOOD TEST: CPT

## 2022-07-10 PROCEDURE — 80048 BASIC METABOLIC PNL TOTAL CA: CPT | Performed by: INTERNAL MEDICINE

## 2022-07-10 PROCEDURE — 83735 ASSAY OF MAGNESIUM: CPT | Performed by: INTERNAL MEDICINE

## 2022-07-10 RX ORDER — SODIUM CHLORIDE AND POTASSIUM CHLORIDE 150; 450 MG/100ML; MG/100ML
75 INJECTION, SOLUTION INTRAVENOUS CONTINUOUS
Status: DISCONTINUED | OUTPATIENT
Start: 2022-07-10 | End: 2022-07-11 | Stop reason: HOSPADM

## 2022-07-10 RX ORDER — INSULIN LISPRO 100 [IU]/ML
5 INJECTION, SOLUTION INTRAVENOUS; SUBCUTANEOUS
Status: DISCONTINUED | OUTPATIENT
Start: 2022-07-10 | End: 2022-07-11 | Stop reason: HOSPADM

## 2022-07-10 RX ORDER — INSULIN LISPRO 100 [IU]/ML
0-7 INJECTION, SOLUTION INTRAVENOUS; SUBCUTANEOUS
Status: DISCONTINUED | OUTPATIENT
Start: 2022-07-10 | End: 2022-07-11 | Stop reason: HOSPADM

## 2022-07-10 RX ADMIN — ENOXAPARIN SODIUM 40 MG: 40 INJECTION SUBCUTANEOUS at 08:46

## 2022-07-10 RX ADMIN — MAGNESIUM SULFATE HEPTAHYDRATE 4 G: 40 INJECTION, SOLUTION INTRAVENOUS at 15:37

## 2022-07-10 RX ADMIN — INSULIN LISPRO 3 UNITS: 100 INJECTION, SOLUTION INTRAVENOUS; SUBCUTANEOUS at 11:57

## 2022-07-10 RX ADMIN — INSULIN LISPRO 5 UNITS: 100 INJECTION, SOLUTION INTRAVENOUS; SUBCUTANEOUS at 17:00

## 2022-07-10 RX ADMIN — Medication 10 ML: at 23:42

## 2022-07-10 RX ADMIN — INSULIN LISPRO 4 UNITS: 100 INJECTION, SOLUTION INTRAVENOUS; SUBCUTANEOUS at 21:37

## 2022-07-10 RX ADMIN — INSULIN LISPRO 2 UNITS: 100 INJECTION, SOLUTION INTRAVENOUS; SUBCUTANEOUS at 08:47

## 2022-07-10 RX ADMIN — INSULIN LISPRO 2 UNITS: 100 INJECTION, SOLUTION INTRAVENOUS; SUBCUTANEOUS at 05:01

## 2022-07-10 RX ADMIN — SODIUM BICARBONATE 650 MG TABLET 650 MG: at 08:46

## 2022-07-10 RX ADMIN — INSULIN LISPRO 2 UNITS: 100 INJECTION, SOLUTION INTRAVENOUS; SUBCUTANEOUS at 02:05

## 2022-07-10 RX ADMIN — INSULIN LISPRO 3 UNITS: 100 INJECTION, SOLUTION INTRAVENOUS; SUBCUTANEOUS at 17:00

## 2022-07-10 RX ADMIN — INSULIN DETEMIR 26 UNITS: 100 INJECTION, SOLUTION SUBCUTANEOUS at 21:37

## 2022-07-10 RX ADMIN — POTASSIUM CHLORIDE AND SODIUM CHLORIDE 75 ML/HR: 450; 150 INJECTION, SOLUTION INTRAVENOUS at 15:36

## 2022-07-10 RX ADMIN — POTASSIUM CHLORIDE 40 MEQ: 750 CAPSULE, EXTENDED RELEASE ORAL at 13:16

## 2022-07-10 RX ADMIN — POTASSIUM CHLORIDE 40 MEQ: 750 CAPSULE, EXTENDED RELEASE ORAL at 16:57

## 2022-07-10 NOTE — PROGRESS NOTES
[facility] Medicine Services  PROGRESS NOTE    Patient Name: Soledad Quigley  : 2000  MRN: [id number]    Date of Admission: 2022  Primary Care Physician: Tawnya Lynne APRN    Subjective   Subjective     CC:  Severe fatigue, polyuria, nausea    HPI:  Feels back to baseline.  She is eating well and her fatigue has resolved.  Asks about proper diet .    ROS:  Gen- No fevers, chills.    CV- No chest pain, palpitations  Resp- No cough, dyspnea  GI- No N/V/D, abd pain         Objective   Objective     Vital Signs:   Temp:  [98 °F (36.7 °C)-98.7 °F (37.1 °C)] 98.3 °F (36.8 °C)  Heart Rate:  [71-85] 71  Resp:  [14-20] 14  BP: (113-132)/(69-88) 122/88     Physical Exam:  Constitutional: Awake, alert in bed,  present, NAD  Eyes: PER, sclerae anicteric, no conjunctival injection  HENT: NCAT, mucous membranes moist  Neck: Supple, trachea midline  Respiratory: Clear to auscultation bilaterally, nonlabored respirations   Cardiovascular: RRR, no murmurs  Gastrointestinal: Positive bowel sounds, soft, nontender, nondistended  Musculoskeletal: No bilateral ankle edema, no clubbing or cyanosis to extremities  Psychiatric: Appropriate affect, cooperative, good insight   Neurologic: alert and conversant,   Skin: No rashes      Results Reviewed:  LAB RESULTS:      Lab 22  0531 22  1718   WBC 6.59 7.72   HEMOGLOBIN 12.5 14.9   HEMATOCRIT 36.5 42.3   PLATELETS 231 250   NEUTROS ABS  --  5.22   IMMATURE GRANS (ABS)  --  0.04   LYMPHS ABS  --  1.84   MONOS ABS  --  0.49   EOS ABS  --  0.06   MCV 89.0 87.0         Lab 07/10/22  1133 22  0531 22  1855 22  1357 22  0826 22  1718   SODIUM 140 138 135* 136 138   < > 131*   POTASSIUM 3.2* 3.5 3.1* 3.6 3.3*   < > 3.9   CHLORIDE 105 111* 109* 112* 112*   < > 99   CO2 24.0 15.0* 15.0* 14.0* 12.0*   < > 9.0*   ANION GAP 11.0 12.0 11.0 10.0 14.0   < > 23.0*   BUN 4* 2* 3* 4* 5*   < > 10   CREATININE  0.34* 0.41* 0.36* 0.35* 0.43*   < > 0.81   EGFR 147.6 141.1 145.6 146.6 139.5   < > 104.1   GLUCOSE 252* 183* 144* 139* 143*   < > 508*   CALCIUM 8.4* 8.2* 8.0* 7.3* 7.2*   < > 9.2   MAGNESIUM 1.7 1.9 2.0 2.1 2.0   < > 1.8   PHOSPHORUS 3.1 3.6 1.1* 1.4* 1.3*   < > 2.4*   HEMOGLOBIN A1C  --   --   --   --   --   --  11.80*   TSH  --   --   --   --   --   --  5.370*    < > = values in this interval not displayed.         Lab 07/07/22  1718   TOTAL PROTEIN 8.0   ALBUMIN 4.60   GLOBULIN 3.4   ALT (SGPT) 21   AST (SGOT) 15   BILIRUBIN 0.7   ALK PHOS 97                     Lab 07/09/22  1214 07/07/22 1931   PH, ARTERIAL 7.376 7.197*   PCO2, ARTERIAL 31.2* 16.7*   PO2 ART 85.0 108.0   FIO2 21 21   HCO3 ART 18.3* 6.5*   BASE EXCESS ART -5.9* -19.3*   CARBOXYHEMOGLOBIN 1.4 1.1     Brief Urine Lab Results  (Last result in the past 365 days)      Color   Clarity   Blood   Leuk Est   Nitrite   Protein   CREAT   Urine HCG        07/07/22 1955               Negative             Microbiology Results Abnormal     Procedure Component Value - Date/Time    Urine Culture - Urine, Urine, Clean Catch [[id number]] Collected: 07/07/22 1920    Lab Status: Final result Specimen: Urine, Clean Catch Updated: 07/09/22 0946     Urine Culture >100,000 CFU/mL Mixed [place]ie Isolated    Narrative:      Specimen contains mixed organisms of questionable pathogenicity suggestive of contamination. If symptoms persist, suggest recollection.  Colonization of the urinary tract without infection is common. Treatment is discouraged unless the patient is symptomatic, pregnant, or undergoing an invasive urologic procedure.    COVID PRE-OP / PRE-PROCEDURE SCREENING ORDER (NO ISOLATION) - Swab, Nasopharynx [[id number]]  (Normal) Collected: 07/07/22 1934    Lab Status: Final result Specimen: Swab from Nasopharynx Updated: 07/07/22 2004    Narrative:      The following orders were created for panel order COVID PRE-OP / PRE-PROCEDURE SCREENING ORDER (NO ISOLATION) -  Swab, Nasopharynx.  Procedure                               Abnormality         Status                     ---------                               -----------         ------                     COVID-19 and FLU A/B PCR...[[id number]]  Normal              Final result                 Please view results for these tests on the individual orders.    COVID-19 and FLU A/B PCR - Swab, Nasopharynx [[id number]]  (Normal) Collected: 07/07/22 1934    Lab Status: Final result Specimen: Swab from Nasopharynx Updated: 07/07/22 2004     COVID19 Not Detected     Influenza A PCR Not Detected     Influenza B PCR Not Detected    Narrative:      Fact sheet for providers: https://www.fda.gov/media/511884/download    Fact sheet for patients: https://www.fda.gov/media/218910/download    Test performed by PCR.          No radiology results from the last 24 hrs        I have reviewed the medications:  Scheduled Meds:enoxaparin, 40 mg, Subcutaneous, Daily  insulin detemir, 26 Units, Subcutaneous, Nightly  insulin lispro, 0-7 Units, Subcutaneous, Q4H  Insulin Lispro, 5 Units, Subcutaneous, TID With Meals  sodium chloride, 10 mL, Intravenous, Q12H      Continuous Infusions:sodium chloride 0.45 % with KCl 20 mEq, 75 mL/hr      PRN Meds:.•  acetaminophen **OR** acetaminophen **OR** acetaminophen  •  dextrose  •  dextrose  •  glucagon (human recombinant)  •  magnesium sulfate **OR** magnesium sulfate **OR** magnesium sulfate  •  potassium & sodium phosphates **OR** potassium & sodium phosphates  •  potassium chloride **OR** potassium chloride **OR** potassium chloride  •  potassium phosphate infusion greater than 15 mMoles **OR** potassium phosphate infusion greater than 15 mMoles **OR** potassium phosphate **OR** sodium phosphate IVPB **OR** sodium phosphate IVPB **OR** sodium phosphate IVPB    Assessment & Plan   Assessment & Plan     Active Hospital Problems    Diagnosis  POA   • **Diabetic ketoacidosis without coma associated with other  specified diabetes mellitus (HCC) [E13.10]  Yes   • Obesity (BMI 30-39.9) [E66.9]  Yes      Resolved Hospital Problems   No resolved problems to display.        Brief Hospital Course to date:  Soledad Quigley is a 22 y.o. female with PMH significant for gestational DM and HTN, anxiety, hypothyroid, obesity.  She came to ED with months of polyuria and polydipsia, worse in the past two weeks.  She takes no home meds and does not have a PCP.  In the ED, her glucose was 508 and anion gap 23.  Ph was 7.2      DKA  New diagnosis of diabetes  - A1C 11.8  - increase levemir and add bolus dosing.    - BMP much better but given her beta-hydroxybutyrate and the fact that it is Sunday, plan on another day here.  - will restart fluids, recheck ketones in AM    - plan on referral to a [place] endocrinologist at [place] - make appt prior to [place].  Getting DM teaching here.   - she has good insight and motivation and would probably do well on a 4-shot daily regimen.    MO - amenable to improving her diet       Expected Discharge Location and Transportation: home in car  Expected Discharge Date: 7/11/22    DVT prophylaxis:  Medical DVT prophylaxis orders are present.          CODE STATUS:   Code Status and Medical Interventions:   Ordered at: 07/07/22 5470     Code Status (Patient has no pulse and is not breathing):    CPR (Attempt to Resuscitate)     Medical Interventions (Patient has pulse or is breathing):    Full Support       Cookie Hong MD  07/10/22

## 2022-07-10 NOTE — PLAN OF CARE
Goal Outcome Evaluation:      MD increased SQ insulin. Potassium replaced per protocol. No complaints throughout shift.

## 2022-07-11 ENCOUNTER — READMISSION MANAGEMENT (OUTPATIENT)
Dept: CALL CENTER | Facility: HOSPITAL | Age: 22
End: 2022-07-11

## 2022-07-11 VITALS
WEIGHT: 215.6 LBS | HEIGHT: 64 IN | OXYGEN SATURATION: 98 % | DIASTOLIC BLOOD PRESSURE: 94 MMHG | TEMPERATURE: 98.5 F | BODY MASS INDEX: 36.81 KG/M2 | SYSTOLIC BLOOD PRESSURE: 128 MMHG | HEART RATE: 76 BPM | RESPIRATION RATE: 18 BRPM

## 2022-07-11 LAB
ANION GAP SERPL CALCULATED.3IONS-SCNC: 11 MMOL/L (ref 5–15)
B-OH-BUTYR SERPL-SCNC: 1.29 MMOL/L (ref 0.02–0.27)
BUN SERPL-MCNC: 5 MG/DL (ref 6–20)
BUN/CREAT SERPL: 14.7 (ref 7–25)
CALCIUM SPEC-SCNC: 8.1 MG/DL (ref 8.6–10.5)
CHLORIDE SERPL-SCNC: 106 MMOL/L (ref 98–107)
CO2 SERPL-SCNC: 24 MMOL/L (ref 22–29)
CREAT SERPL-MCNC: 0.34 MG/DL (ref 0.57–1)
EGFRCR SERPLBLD CKD-EPI 2021: 147.6 ML/MIN/1.73
GLUCOSE BLDC GLUCOMTR-MCNC: 224 MG/DL (ref 70–130)
GLUCOSE BLDC GLUCOMTR-MCNC: 227 MG/DL (ref 70–130)
GLUCOSE SERPL-MCNC: 187 MG/DL (ref 65–99)
POTASSIUM SERPL-SCNC: 3.6 MMOL/L (ref 3.5–5.2)
SODIUM SERPL-SCNC: 141 MMOL/L (ref 136–145)

## 2022-07-11 PROCEDURE — 82962 GLUCOSE BLOOD TEST: CPT

## 2022-07-11 PROCEDURE — 63710000001 INSULIN LISPRO (HUMAN) PER 5 UNITS: Performed by: INTERNAL MEDICINE

## 2022-07-11 PROCEDURE — 80048 BASIC METABOLIC PNL TOTAL CA: CPT | Performed by: INTERNAL MEDICINE

## 2022-07-11 PROCEDURE — 25010000002 ENOXAPARIN PER 10 MG: Performed by: NURSE PRACTITIONER

## 2022-07-11 PROCEDURE — 82010 KETONE BODYS QUAN: CPT | Performed by: INTERNAL MEDICINE

## 2022-07-11 PROCEDURE — 99239 HOSP IP/OBS DSCHRG MGMT >30: CPT | Performed by: INTERNAL MEDICINE

## 2022-07-11 RX ORDER — INSULIN DETEMIR 100 [IU]/ML
26 INJECTION, SOLUTION SUBCUTANEOUS NIGHTLY
Qty: 15 ML | Refills: 3 | Status: SHIPPED | OUTPATIENT
Start: 2022-07-11 | End: 2022-07-28 | Stop reason: SDUPTHER

## 2022-07-11 RX ORDER — INSULIN LISPRO 100 [IU]/ML
7 INJECTION, SOLUTION INTRAVENOUS; SUBCUTANEOUS
Qty: 15 ML | Refills: 3 | Status: SHIPPED | OUTPATIENT
Start: 2022-07-11 | End: 2022-07-28 | Stop reason: SDUPTHER

## 2022-07-11 RX ORDER — BLOOD SUGAR DIAGNOSTIC
STRIP MISCELLANEOUS
Qty: 100 EACH | Refills: 12 | Status: SHIPPED | OUTPATIENT
Start: 2022-07-11 | End: 2022-07-28 | Stop reason: SDUPTHER

## 2022-07-11 RX ORDER — LANCETS 28 GAUGE
1 EACH MISCELLANEOUS 4 TIMES DAILY
Qty: 100 EACH | Refills: 12 | Status: SHIPPED | OUTPATIENT
Start: 2022-07-11

## 2022-07-11 RX ORDER — BLOOD-GLUCOSE METER
KIT MISCELLANEOUS TAKE AS DIRECTED
Qty: 1 KIT | Refills: 0 | Status: SHIPPED | OUTPATIENT
Start: 2022-07-11

## 2022-07-11 RX ORDER — PEN NEEDLE, DIABETIC 30 GX3/16"
1 NEEDLE, DISPOSABLE MISCELLANEOUS 4 TIMES DAILY PRN
Qty: 100 EACH | Refills: 12 | Status: SHIPPED | OUTPATIENT
Start: 2022-07-11 | End: 2022-07-28 | Stop reason: SDUPTHER

## 2022-07-11 RX ADMIN — INSULIN LISPRO 5 UNITS: 100 INJECTION, SOLUTION INTRAVENOUS; SUBCUTANEOUS at 12:41

## 2022-07-11 RX ADMIN — Medication 10 ML: at 09:20

## 2022-07-11 RX ADMIN — INSULIN LISPRO 5 UNITS: 100 INJECTION, SOLUTION INTRAVENOUS; SUBCUTANEOUS at 09:19

## 2022-07-11 RX ADMIN — INSULIN LISPRO 3 UNITS: 100 INJECTION, SOLUTION INTRAVENOUS; SUBCUTANEOUS at 12:41

## 2022-07-11 RX ADMIN — INSULIN LISPRO 3 UNITS: 100 INJECTION, SOLUTION INTRAVENOUS; SUBCUTANEOUS at 09:20

## 2022-07-11 RX ADMIN — ENOXAPARIN SODIUM 40 MG: 40 INJECTION SUBCUTANEOUS at 09:19

## 2022-07-11 NOTE — OUTREACH NOTE
Prep Survey    Flowsheet Row Responses   [facility] facility patient discharged from? [place]   Is LACE score < 7 ? No   Emergency Room discharge w/ pulse ox? No   Eligibility [facility]   Date of Admission 07/07/22   Date of Discharge 07/11/22   Discharge Disposition Home or Self Care   Discharge diagnosis Diabetic ketoacidosis    Does the patient have one of the following disease processes/diagnoses(primary or secondary)? Other   Does the patient have Home health ordered? No   Is there a DME ordered? No   Prep survey completed? Yes          NED KENNY - Registered Nurse

## 2022-07-11 NOTE — PLAN OF CARE
Goal Outcome Evaluation:  Plan of Care Reviewed With: patient, spouse        Progress: improving  Outcome Evaluation: No events through the night. NSR,VSS, no complaints of pain.

## 2022-07-11 NOTE — CASE MANAGEMENT/SOCIAL WORK
Continued Stay Note  [facility]     Patient Name: Soledad Quigley  MRN: [id number]  Today's Date: 7/11/2022    Admit Date: 7/7/2022     Discharge Plan     Row Name 07/11/22 1340       Plan    Plan home    Patient/Family in Agreement with Plan yes    Plan Comments I met with Mrs. Quigley at the bedside. She is being discharged home today. She denies having any discharge needs. I have made an appointment for her to establish care with a primary care provider. This information is in the AVS.    Final Discharge Disposition Code 01 - home or self-care               Discharge Codes    No documentation.               Expected Discharge Date and Time     Expected Discharge Date Expected Discharge Time    Jul 11, 2022             Mayank Stearns RN

## 2022-07-11 NOTE — PLAN OF CARE
Goal Outcome Evaluation:     Patient has orders for discharge.  Insulin administration education completed and demonstrated.  Patient agreeable with DC plan.

## 2022-07-11 NOTE — DISCHARGE SUMMARY
[facility] Medicine Services  DISCHARGE SUMMARY    Patient Name: Soleadd Quigley  : 2000  MRN: [id number]    Date of Admission: 2022  5:57 PM  Date of Discharge: 22  Primary Care Physician: Tawnya Lynne, EVER    Consults     No orders found from 2022 to 2022.          Hospital Course     Presenting Problem:   Diabetic ketoacidosis without coma associated with other specified diabetes mellitus (HCC) [E13.10]    Active Hospital Problems    Diagnosis  POA   • **Diabetic ketoacidosis without coma associated with other specified diabetes mellitus (HCC) [E13.10]  Yes   • Obesity (BMI 30-39.9) [E66.9]  Yes      Resolved Hospital Problems   No resolved problems to display.          Hospital Course:  Soledad Quigley is a 22 y.o. female with PMH significant for gestational DM and HTN, anxiety, hypothyroid, obesity.  She came to ED with months of polyuria and polydipsia, worse in the past two weeks.  She takes no home meds and does not have a PCP.  In the ED, her glucose was 508 and anion gap 23.  Ph was 7.2        DKA  New diagnosis of diabetes  - A1C 11.8  - Patient placed on insulin upon admission with IVF. Improved greatly  - was placed on basal/bolus dosing with adequate BS- ultimately discharged on 26 units long acting and 7 units with meals   - plan on referral to a [place] endocrinologist at [facility] - CM worked on arranging appointment at [facility].  Getting DM teaching here. Patient felt comfortable with plan at time of discharge and was comfortable with 4-shot regimen at discharge  - she has good insight and motivation and would probably do well on a 4-shot daily regimen.  - was counseled by nutrition on diet education- motivated to loose weight and improve nutrition  - PCP found at [facility] by OFELIA and set up for patient           Discharge Follow Up Recommendations for outpatient labs/diagnostics:  PCP as scheduled by CM  Endocrine first available     Day of Discharge     HPI:   Doing  well. Feels good to go home. Feels comfortable with insulin. No other questions     Review of Systems  Gen- No fevers, chills  CV- No chest pain, palpitations  Resp- No cough, dyspnea  GI- No N/V/D, abd pain        Vital Signs:   Temp:  [98 °F (36.7 °C)-98.5 °F (36.9 °C)] 98.5 °F (36.9 °C)  Heart Rate:  [] 76  Resp:  [17-20] 18  BP: (114-141)/(81-94) 128/94      Physical Exam:  GEN: NAD, resting in bed, awake  HEENT: on room air, atraumatic, normocephalic  NECK: supple, no masses  RESP: on room air, normal effort  CV: on tele, sinus rhythm  PSYCH: normal affect, appropriate  NEURO: awake, alert, no focal deficits noted  MSK: no edema noted  SKIN: no rashes noted       Pertinent  and/or Most Recent Results     LAB RESULTS:      Lab 07/09/22  0531 07/07/22  1718   WBC 6.59 7.72   HEMOGLOBIN 12.5 14.9   HEMATOCRIT 36.5 42.3   PLATELETS 231 250   NEUTROS ABS  --  5.22   IMMATURE GRANS (ABS)  --  0.04   LYMPHS ABS  --  1.84   MONOS ABS  --  0.49   EOS ABS  --  0.06   MCV 89.0 87.0         Lab 07/11/22  0355 07/10/22  1133 07/09/22  0531 07/08/22  1855 07/08/22  1357 07/08/22  0826 07/07/22  2013 07/07/22  1718   SODIUM 141 140 138 135* 136 138   < > 131*   POTASSIUM 3.6 3.2* 3.5 3.1* 3.6 3.3*   < > 3.9   CHLORIDE 106 105 111* 109* 112* 112*   < > 99   CO2 24.0 24.0 15.0* 15.0* 14.0* 12.0*   < > 9.0*   ANION GAP 11.0 11.0 12.0 11.0 10.0 14.0   < > 23.0*   BUN 5* 4* 2* 3* 4* 5*   < > 10   CREATININE 0.34* 0.34* 0.41* 0.36* 0.35* 0.43*   < > 0.81   EGFR 147.6 147.6 141.1 145.6 146.6 139.5   < > 104.1   GLUCOSE 187* 252* 183* 144* 139* 143*   < > 508*   CALCIUM 8.1* 8.4* 8.2* 8.0* 7.3* 7.2*   < > 9.2   MAGNESIUM  --  1.7 1.9 2.0 2.1 2.0   < > 1.8   PHOSPHORUS  --  3.1 3.6 1.1* 1.4* 1.3*   < > 2.4*   HEMOGLOBIN A1C  --   --   --   --   --   --   --  11.80*   TSH  --   --   --   --   --   --   --  5.370*    < > = values in this interval not displayed.         Lab 07/07/22  9676   TOTAL PROTEIN 8.0   ALBUMIN 4.60    GLOBULIN 3.4   ALT (SGPT) 21   AST (SGOT) 15   BILIRUBIN 0.7   ALK PHOS 97                     Lab 07/09/22  1214 07/07/22 1931   PH, ARTERIAL 7.376 7.197*   PCO2, ARTERIAL 31.2* 16.7*   PO2 ART 85.0 108.0   FIO2 21 21   HCO3 ART 18.3* 6.5*   BASE EXCESS ART -5.9* -19.3*   CARBOXYHEMOGLOBIN 1.4 1.1     Brief Urine Lab Results  (Last result in the past 365 days)      Color   Clarity   Blood   Leuk Est   Nitrite   Protein   CREAT   Urine HCG        07/07/22 1955               Negative           Microbiology Results (last 10 days)     Procedure Component Value - Date/Time    COVID PRE-OP / PRE-PROCEDURE SCREENING ORDER (NO ISOLATION) - Swab, Nasopharynx [[id number]]  (Normal) Collected: 07/07/22 1934    Lab Status: Final result Specimen: Swab from Nasopharynx Updated: 07/07/22 2004    Narrative:      The following orders were created for panel order COVID PRE-OP / PRE-PROCEDURE SCREENING ORDER (NO ISOLATION) - Swab, Nasopharynx.  Procedure                               Abnormality         Status                     ---------                               -----------         ------                     COVID-19 and FLU A/B PCR...[[id number]]  Normal              Final result                 Please view results for these tests on the individual orders.    COVID-19 and FLU A/B PCR - Swab, Nasopharynx [[id number]]  (Normal) Collected: 07/07/22 1934    Lab Status: Final result Specimen: Swab from Nasopharynx Updated: 07/07/22 2004     COVID19 Not Detected     Influenza A PCR Not Detected     Influenza B PCR Not Detected    Narrative:      Fact sheet for providers: https://www.fda.gov/media/811325/download    Fact sheet for patients: https://www.fda.gov/media/772752/download    Test performed by PCR.    Urine Culture - Urine, Urine, Clean Catch [[id number]] Collected: 07/07/22 1920    Lab Status: Final result Specimen: Urine, Clean Catch Updated: 07/09/22 0946     Urine Culture >100,000 CFU/mL Mixed [place]ie Isolated     Narrative:      Specimen contains mixed organisms of questionable pathogenicity suggestive of contamination. If symptoms persist, suggest recollection.  Colonization of the urinary tract without infection is common. Treatment is discouraged unless the patient is symptomatic, pregnant, or undergoing an invasive urologic procedure.          XR Chest 1 View    Result Date: 7/7/2022  DATE OF EXAM: 7/7/2022 7:41 PM  PROCEDURE: XR CHEST 1 VW-  INDICATIONS: dka; E13.10-Other specified diabetes mellitus with ketoacidosis without coma  COMPARISON: No comparisons available.  TECHNIQUE: Single radiographic AP view of the chest was obtained.  FINDINGS: The heart is not definitely enlarged. The lungs seem clear. There are no pleural effusions. The visualized osseous structures do not appear unusual.      1.  An acute pulmonary process is not apparent.  This report was finalized on 7/7/2022 7:57 PM by Florencio Ibrahim MD.                    Plan for Follow-up of Pending Labs/Results: none    Discharge Details        Discharge Medications      New Medications      Instructions Start Date   Alcohol Pads 70 % pads   Apply one alcohol swab to injection site of skin immediately prior to insulin injection. Formulary Compliance Approval.      glucose blood test strip   Use to test blood glucose up to four times daily as needed. Formulary Compliance Approval. Diagnosis: Type 2 Diabetes - Insulin Dependent      Glucose Management tablet   Use as needed for emergently low blood glucose levels. Formulary Compliance Approval      glucose monitor monitoring kit   Use to test blood glucose up to four times daily as needed. Formulary Compliance Approval. Diagnosis: Type 2 Diabetes - Insulin Dependent      Insulin Lispro (1 Unit Dial) 100 UNIT/ML solution pen-injector  Commonly known as: HumaLOG KwikPen   7 Units, Subcutaneous, 3 Times Daily With Meals, Formulary Compliance Approval      Lancets misc   Use to test blood glucose up to four times  daily as needed. Formulary Compliance Approval. Diagnosis: Type 2 Diabetes - Insulin Dependent      Levemir FlexTouch 100 UNIT/ML injection  Generic drug: insulin detemir   26 Units, Subcutaneous, Nightly, Formulary Compliance Approval      Pen Needles 32G X 4 MM misc   1 each, Subcutaneous, 4 Times Daily PRN, Formulary Compliance Approval             Allergies   Allergen Reactions   • Sulfa Antibiotics Diarrhea, Nausea And Vomiting and GI Intolerance   • Bactrim [Sulfamethoxazole-Trimethoprim] Diarrhea, Nausea And Vomiting and GI Intolerance         Discharge Disposition:  Home or Self Care    Diet:  Hospital:  Diet Order   Procedures   • Diet Regular; Consistent Carbohydrate       Activity:  As tolerated      Restrictions or Other Recommendations:  As tolerated       CODE STATUS:    Code Status and Medical Interventions:   Ordered at: 07/07/22 2102     Code Status (Patient has no pulse and is not breathing):    CPR (Attempt to Resuscitate)     Medical Interventions (Patient has pulse or is breathing):    Full Support       Future Appointments   Date Time Provider Department Center   7/18/2022  2:15 PM Tawnya Lynne APRN [facility]       Additional Instructions for the Follow-ups that You Need to Schedule     Discharge Follow-up with PCP   As directed       Currently Documented PCP:    Tawnya Lynne APRN    PCP Phone Number:    [phone]     Follow Up Details: PCP as CM arranged         Discharge Follow-up with Specified Provider: Endocrine as arranged first available   As directed      To: Endocrine as arranged first available                     Michelle Joseph MD  07/11/22      Time Spent on Discharge:  I spent  35 minutes on this discharge activity which included: face-to-face encounter with the patient, reviewing the data in the system, coordination of the care with the nursing staff as well as consultants, documentation, and entering orders.

## 2022-07-11 NOTE — CONSULTS
Diabetes Education  Assessment/Teaching    Patient Name:  Soledad Quigley  YOB: 2000  MRN: [id number]  Admit Date:  7/7/2022    Patient called at discharge. Pt reports no further teaching needed. Seen on 7/8 by diabetes education RN. Thank you!           Electronically signed by:  Consuelo Vaughn RN  07/11/22 15:55 EDT

## 2022-07-12 ENCOUNTER — TRANSITIONAL CARE MANAGEMENT TELEPHONE ENCOUNTER (OUTPATIENT)
Dept: CALL CENTER | Facility: HOSPITAL | Age: 22
End: 2022-07-12

## 2022-07-12 ENCOUNTER — EDUCATION (OUTPATIENT)
Dept: DIABETES SERVICES | Facility: HOSPITAL | Age: 22
End: 2022-07-12

## 2022-07-12 NOTE — CONSULTS
Diabetes Education    Patient Name:  Soledad Quigley  YOB: 2000  MRN: [id number]  Admit Date:  (Not on file)      Pt telephoned for courtesy follow-up from diabetes education received during her recent hospitalization. No answer; LVM encouraging her to call back.     Electronically signed by:  Trinidad Lopez RN  07/12/22 09:38 EDT

## 2022-07-12 NOTE — OUTREACH NOTE
Call Center TCM Note    Flowsheet Row Responses   [facility] patient discharged from? [facility]   Does the patient have one of the following disease processes/diagnoses(primary or secondary)? Other   TCM attempt successful? Yes   Call start time 1406   Call end time 1408   Discharge diagnosis Diabetic ketoacidosis    Does the patient have all medications ordered at discharge? Yes   Is the patient taking all medications as directed (includes completed medication regime)? Yes   Does the patient have a primary care provider?  Yes   Does the patient have an appointment with their PCP within 7 days of discharge? Yes   Comments regarding PCP new patient appt with EVER Carrera on 7/18   Has the patient kept scheduled appointments due by today? N/A   Has home health visited the patient within 72 hours of discharge? N/A   Psychosocial issues? No   Did the patient receive a copy of their discharge instructions? Yes   What is the patient's perception of their health status since discharge? Improving   Is the patient/caregiver able to teach back the hierarchy of who to call/visit for symptoms/problems? PCP, Specialist, Home health nurse, Urgent Care, ED, 911 Yes   TCM call completed? Yes   Wrap up additional comments Doing well, no questions, confirmed appt with PCP for 7/18.          Monisha Merrill RN    7/12/2022, 14:08 EDT

## 2022-07-18 ENCOUNTER — OFFICE VISIT (OUTPATIENT)
Dept: INTERNAL MEDICINE | Facility: CLINIC | Age: 22
End: 2022-07-18

## 2022-07-18 VITALS
HEIGHT: 64 IN | OXYGEN SATURATION: 96 % | WEIGHT: 228 LBS | BODY MASS INDEX: 38.93 KG/M2 | DIASTOLIC BLOOD PRESSURE: 79 MMHG | RESPIRATION RATE: 16 BRPM | HEART RATE: 81 BPM | TEMPERATURE: 96.9 F | SYSTOLIC BLOOD PRESSURE: 120 MMHG

## 2022-07-18 DIAGNOSIS — F41.9 ANXIETY: ICD-10-CM

## 2022-07-18 DIAGNOSIS — H53.8 BLURRED VISION, BILATERAL: ICD-10-CM

## 2022-07-18 DIAGNOSIS — E13.10 DIABETIC KETOACIDOSIS WITHOUT COMA ASSOCIATED WITH OTHER SPECIFIED DIABETES MELLITUS: Primary | ICD-10-CM

## 2022-07-18 DIAGNOSIS — E28.2 PCOS (POLYCYSTIC OVARIAN SYNDROME): ICD-10-CM

## 2022-07-18 DIAGNOSIS — Z09 HOSPITAL DISCHARGE FOLLOW-UP: ICD-10-CM

## 2022-07-18 DIAGNOSIS — Z76.89 ENCOUNTER TO ESTABLISH CARE: ICD-10-CM

## 2022-07-18 PROCEDURE — 99214 OFFICE O/P EST MOD 30 MIN: CPT | Performed by: NURSE PRACTITIONER

## 2022-07-18 RX ORDER — ESCITALOPRAM OXALATE 5 MG/1
5 TABLET ORAL DAILY
Qty: 30 TABLET | Refills: 0 | Status: SHIPPED | OUTPATIENT
Start: 2022-07-18 | End: 2022-09-22

## 2022-07-18 RX ORDER — METFORMIN HYDROCHLORIDE 500 MG/1
500 TABLET, EXTENDED RELEASE ORAL
Qty: 90 TABLET | Refills: 1 | Status: SHIPPED | OUTPATIENT
Start: 2022-07-18 | End: 2023-03-01

## 2022-07-18 NOTE — PROGRESS NOTES
"Transitional Care Follow Up Visit  Subjective     Soledad Quigley is a 22 y.o. female who presents for a transitional care management visit and to establish care.     Within 48 business hours after discharge our office contacted her via telephone to coordinate her care and needs.      I reviewed and discussed the details of that call along with the discharge summary, hospital problems, inpatient lab results, inpatient diagnostic studies, and consultation reports with Soledad.     Current outpatient and discharge medications have been reconciled for the patient.  Reviewed by: EVER Sapp      Date of TCM Phone Call 7/11/2022   [facility]   Date of Admission 7/7/2022   Date of Discharge 7/11/2022   Discharge Disposition Home or Self Care     Risk for Readmission (LACE) Score: 8 (7/11/2022  6:01 AM)      History of Present Illness   Course During Hospital Stay:      Discharge note reviewed. Per Michelle Joseph MD:    \"Soledad Quigley is a 22 y.o. female with PMH significant for gestational DM and HTN, anxiety, hypothyroid, obesity.  She came to ED with months of polyuria and polydipsia, worse in the past two weeks.  She takes no home meds and does not have a PCP.  In the ED, her glucose was 508 and anion gap 23.  Ph was 7.2  DKA  New diagnosis of diabetes  - A1C 11.8  - Patient placed on insulin upon admission with IVF. Improved greatly  - was placed on basal/bolus dosing with adequate BS- ultimately discharged on 26 units long acting and 7 units with meals   - plan on referral to a [place] endocrinologist at [facility] - CM worked on arranging appointment at [facility].  Getting DM teaching here. Patient felt comfortable with plan at time of discharge and was comfortable with 4-shot regimen at discharge  - she has good insight and motivation and would probably do well on a 4-shot daily regimen.  - was counseled by nutrition on diet education- motivated to loose weight and improve nutrition  - PCP found at [facility] by CM and set " "up for patient\"     Patient presents today feeling well.  Doing well with her insulin injections.  States her blood sugars have been running high since getting discharged.  She does check her glucose in the morning fasting, this morning it was 177.  States she is trying to get better about checking her blood sugar.  She is pending a appointment with endocrinologist.  Checked on this for patient and it looks like they tried to call her and was unable to leave a voicemail.  Number gave to patient for her to call back and reschedule.  Patient does have a history of gestational diabetes that was diet-controlled.  She does have a family history of diabetes.  She is working on her diet and has made a lot of changes.    Patient states she has a lot of anxiety.  Has been on Lexapro in the past and feels like it helped.  She failed treatment with Zoloft before.  Requesting that I initiate Lexapro back.  Declines counseling at this time.  Patient has PCOS.  She denies hair growth.  She has had trouble with her weight in the past and irregular periods.  She was taking birth control before but does not want to go back on the hormones because they cause mood changes.  She is going to schedule an appointment with GYN to discuss other options.  Interested in the idea of a possible IUD or other medicine.       The following portions of the patient's history were reviewed and updated as appropriate: allergies, current medications, past family history, past medical history, past social history, past surgical history and problem list.    Review of Systems   Constitutional: Positive for fatigue. Negative for chills and fever.   HENT: Negative.    Eyes: Positive for visual disturbance (blurred, worsening, wearing readers).   Respiratory: Negative.    Cardiovascular: Negative.    Gastrointestinal: Negative.    Endocrine: Negative.    Genitourinary: Negative.    Musculoskeletal: Negative.    Skin: Negative.    Hematological: Negative.  "   Psychiatric/Behavioral: Positive for dysphoric mood. Negative for self-injury and suicidal ideas. The patient is nervous/anxious.    All other systems reviewed and are negative.      Objective   Physical Exam  Vitals and nursing note reviewed.   Constitutional:       General: She is not in acute distress.     Appearance: Normal appearance. She is obese. She is not ill-appearing, toxic-appearing or diaphoretic.   HENT:      Head: Normocephalic and atraumatic.      Nose: Nose normal.      Mouth/Throat:      Mouth: Mucous membranes are moist.   Eyes:      Extraocular Movements: Extraocular movements intact.      Conjunctiva/sclera: Conjunctivae normal.      Pupils: Pupils are equal, round, and reactive to light.   Cardiovascular:      Rate and Rhythm: Normal rate and regular rhythm.   Pulmonary:      Effort: Pulmonary effort is normal.      Breath sounds: Normal breath sounds.   Abdominal:      General: Abdomen is flat. Bowel sounds are normal. There is no distension.      Palpations: Abdomen is soft.      Tenderness: There is no abdominal tenderness. There is no guarding or rebound.   Musculoskeletal:         General: Normal range of motion.      Cervical back: Normal range of motion.   Skin:     General: Skin is warm and dry.   Neurological:      General: No focal deficit present.      Mental Status: She is alert and oriented to person, place, and time.   Psychiatric:         Mood and Affect: Mood normal.         Behavior: Behavior normal.         Thought Content: Thought content normal.         Judgment: Judgment normal.         Assessment & Plan   Diagnoses and all orders for this visit:    1. S/p Diabetic ketoacidosis without coma (HCC) (Primary)  -     metFORMIN ER (Glucophage XR) 500 MG 24 hr tablet; Take 1 tablet by mouth Daily With Breakfast.  Dispense: 90 tablet; Refill: 1  -     Ambulatory Referral for Diabetic Eye Exam-Ophthalmology    2. Blurred vision, bilateral  -     Ambulatory Referral for Diabetic  Eye Exam-Ophthalmology    3. Hospital discharge follow-up    4. Encounter to establish care    5. Anxiety  -     escitalopram (Lexapro) 5 MG tablet; Take 1 tablet by mouth Daily.  Dispense: 30 tablet; Refill: 0    6. PCOS (polycystic ovarian syndrome)  -     metFORMIN ER (Glucophage XR) 500 MG 24 hr tablet; Take 1 tablet by mouth Daily With Breakfast.  Dispense: 90 tablet; Refill: 1      Will initiate metformin due to PCOS diagnosis and newly diagnosed diabetes.  Lexapro 5 mg initiated.  Discussed with patient that it can take 6 to 8 weeks to have the full effect.  Follow-up in 4 weeks.  Can increase dose if tolerating at that point.  Consider counseling in the future.  Discussed with others how you feel.  Phone number given to patient for endocrinology office.  In the referrals notices they tried to call her and was unable to leave a voicemail.  Instructed patient to call them back to schedule this.  Patient verbalizes understanding.    F/u 3 months

## 2022-07-21 ENCOUNTER — READMISSION MANAGEMENT (OUTPATIENT)
Dept: CALL CENTER | Facility: HOSPITAL | Age: 22
End: 2022-07-21

## 2022-07-21 NOTE — OUTREACH NOTE
Medical Week 2 Survey    Flowsheet Row Responses   [facility] patient discharged from? [facility]   Does the patient have one of the following disease processes/diagnoses(primary or secondary)? Other   Week 2 attempt successful? Yes   Call start time 1511   Discharge diagnosis Diabetic ketoacidosis    Call end time 1513   Meds reviewed with patient/caregiver? Yes   Is the patient having any side effects they believe may be caused by any medication additions or changes? No   Does the patient have all medications ordered at discharge? Yes   Is the patient taking all medications as directed (includes completed medication regime)? Yes   Does the patient have a primary care provider?  Yes   Does the patient have an appointment with their PCP within 7 days of discharge? Yes   Has the patient kept scheduled appointments due by today? Yes   Has home health visited the patient within 72 hours of discharge? N/A   Psychosocial issues? No   Comments back to work, tolerating well   Did the patient receive a copy of their discharge instructions? Yes   What is the patient's perception of their health status since discharge? Improving   Is the patient/caregiver able to teach back signs and symptoms related to disease process for when to call PCP? Yes   Is the patient/caregiver able to teach back signs and symptoms related to disease process for when to call 911? Yes   Is the patient/caregiver able to teach back the hierarchy of who to call/visit for symptoms/problems? PCP, Specialist, Home health nurse, Urgent Care, ED, 911 Yes   Additional teach back comments vision not quite back to normal, all other symptoms WNL's, blood sugars stable   Week 2 Call Completed? Yes          HOA DEAL - Registered Nurse

## 2022-07-28 RX ORDER — PEN NEEDLE, DIABETIC 30 GX3/16"
1 NEEDLE, DISPOSABLE MISCELLANEOUS 4 TIMES DAILY PRN
Qty: 100 EACH | Refills: 12 | Status: SHIPPED | OUTPATIENT
Start: 2022-07-28 | End: 2022-07-28

## 2022-07-28 RX ORDER — BLOOD SUGAR DIAGNOSTIC
STRIP MISCELLANEOUS
Qty: 100 EACH | Refills: 12 | Status: SHIPPED | OUTPATIENT
Start: 2022-07-28 | End: 2022-07-28

## 2022-07-28 RX ORDER — INSULIN DETEMIR 100 [IU]/ML
26 INJECTION, SOLUTION SUBCUTANEOUS NIGHTLY
Qty: 15 ML | Refills: 3 | Status: SHIPPED | OUTPATIENT
Start: 2022-07-28 | End: 2023-02-17 | Stop reason: SDUPTHER

## 2022-07-28 RX ORDER — BLOOD SUGAR DIAGNOSTIC
STRIP MISCELLANEOUS
Qty: 100 EACH | Refills: 12 | OUTPATIENT
Start: 2022-07-28

## 2022-07-28 RX ORDER — PEN NEEDLE, DIABETIC 30 GX3/16"
1 NEEDLE, DISPOSABLE MISCELLANEOUS 4 TIMES DAILY PRN
Qty: 100 EACH | Refills: 12 | OUTPATIENT
Start: 2022-07-28

## 2022-07-28 RX ORDER — INSULIN LISPRO 100 [IU]/ML
7 INJECTION, SOLUTION INTRAVENOUS; SUBCUTANEOUS
Qty: 15 ML | Refills: 3 | Status: SHIPPED | OUTPATIENT
Start: 2022-07-28 | End: 2023-03-01

## 2022-07-28 NOTE — TELEPHONE ENCOUNTER
Caller: Soledad Quigley    Relationship: Self    Best call back number: [phone]    Requested Prescriptions:   Requested Prescriptions     Pending Prescriptions Disp Refills   • glucose blood test strip 100 each 12     Sig: Use to test blood glucose up to four times daily as needed.   • Insulin Pen Needle (Pen Needles) 32G X 4 MM misc 100 each 12     Sig: Inject 1 each under the skin into the appropriate area as directed 4 (Four) Times a Day As Needed (for insulin injections).   • Alcohol Swabs (Alcohol Pads) 70 % pads 100 each 12     Sig: Apply one alcohol swab to injection site of skin immediately prior to insulin injection.   • Insulin Lispro, 1 Unit Dial, (HumaLOG KwikPen) 100 UNIT/ML solution pen-injector 15 mL 3     Sig: Inject 7 Units under the skin into the appropriate area as directed 3 (Three) Times a Day With Meals   • insulin detemir (Levemir FlexTouch) 100 UNIT/ML injection 15 mL 3     Sig: Inject 26 Units under the skin into the appropriate area as directed Every Night        Pharmacy where request should be sent:  [facility]   [address] [place], [place] [id number]  PHONE: [phone]     Additional details provided by patient: PLEASE REFILL.    Does the patient have less than a 3 day supply:  [] Yes  [x] No    Yudith Persaud Rep   07/28/22 12:43 EDT     THANK YOU.

## 2022-08-08 ENCOUNTER — TELEPHONE (OUTPATIENT)
Dept: INTERNAL MEDICINE | Facility: CLINIC | Age: 22
End: 2022-08-08

## 2022-08-08 NOTE — TELEPHONE ENCOUNTER
Rx Refill Note  Requested Prescriptions     Pending Prescriptions Disp Refills   • glucose blood test strip 100 each 12     Sig: Use to test blood glucose up to four times daily as needed.      Last office visit with prescribing clinician: 7/18/2022      Next office visit with prescribing clinician: 8/15/2022            Rachel Barbour MA  08/08/22, 15:22 EDT       Previous prescription was sent in as Phone In- sent in electronically

## 2022-08-08 NOTE — TELEPHONE ENCOUNTER
Caller: Soledad Quigley    Relationship: Self    Best call back number: [phone]    Requested Prescriptions:   Requested Prescriptions     Pending Prescriptions Disp Refills   • glucose blood test strip 100 each 12     Sig: Use to test blood glucose up to four times daily as needed.        Pharmacy where request should be sent: [facility] [address] [place] [address] [address] [facility] AT [address] [phone] [facility] [phone]      Additional details provided by patient: GLUCOSE TEST STRIPS WERE NOT ORDERED LAST TIME, SHE IS OUT COMPLETELY    Does the patient have less than a 3 day supply:  [x] Yes  [] No    Yudith PEDERSON Rep   08/08/22 15:14 EDT

## 2022-08-16 ENCOUNTER — TELEPHONE (OUTPATIENT)
Dept: INTERNAL MEDICINE | Facility: CLINIC | Age: 22
End: 2022-08-16

## 2022-08-16 NOTE — TELEPHONE ENCOUNTER
Contacted patient and notified her that PCP was out of the office today and she would need to be seen by extender. Patient expressed understanding and is scheduled tomorrow 08/17/2022 as video visit

## 2022-08-16 NOTE — TELEPHONE ENCOUNTER
Caller: Soledad Quigley    Relationship to patient: Self    Best call back number: [phone]    Date of positive COVID19 test: 08/15/22    COVID19 symptoms: COUGHING UNTIL VOMITING, SORE THROAT, HOT/COLD FLASHES, CONGESTION, HEADACHE, BODY ACHES     Additional information or concerns: PATIENT WANTING TO KNOW IF SOMETHING CAN BE CALLED IN    What is the patients preferred pharmacy: [facility] [address] [place] [address]  - [phone]  - [phone]   [phone]

## 2022-09-22 DIAGNOSIS — F41.9 ANXIETY: ICD-10-CM

## 2022-09-22 RX ORDER — ESCITALOPRAM OXALATE 5 MG/1
TABLET ORAL
Qty: 30 TABLET | Refills: 0 | Status: SHIPPED | OUTPATIENT
Start: 2022-09-22 | End: 2023-02-24 | Stop reason: SDUPTHER

## 2022-09-22 NOTE — TELEPHONE ENCOUNTER
Rx Refill Note  Requested Prescriptions     Pending Prescriptions Disp Refills   • escitalopram (LEXAPRO) 5 MG tablet [Pharmacy Med Name: ESCITALOPRAM 5 MG TABLET] 30 tablet 0     Sig: TAKE ONE TABLET BY MOUTH DAILY      Last office visit with prescribing clinician: 7/18/2022      Next office visit with prescribing clinician: Visit date not found            Leisa Arboleda LPN  09/22/22, 15:52 EDT

## 2022-10-20 ENCOUNTER — OFFICE VISIT (OUTPATIENT)
Dept: ENDOCRINOLOGY | Facility: CLINIC | Age: 22
End: 2022-10-20

## 2022-10-20 ENCOUNTER — LAB (OUTPATIENT)
Dept: LAB | Facility: HOSPITAL | Age: 22
End: 2022-10-20

## 2022-10-20 VITALS
BODY MASS INDEX: 37.39 KG/M2 | HEIGHT: 64 IN | HEART RATE: 77 BPM | WEIGHT: 219 LBS | SYSTOLIC BLOOD PRESSURE: 122 MMHG | DIASTOLIC BLOOD PRESSURE: 75 MMHG | OXYGEN SATURATION: 98 %

## 2022-10-20 DIAGNOSIS — Z79.4 TYPE 2 DIABETES MELLITUS WITH HYPERGLYCEMIA, WITH LONG-TERM CURRENT USE OF INSULIN: ICD-10-CM

## 2022-10-20 DIAGNOSIS — Z79.4 TYPE 2 DIABETES MELLITUS WITH HYPERGLYCEMIA, WITH LONG-TERM CURRENT USE OF INSULIN: Primary | ICD-10-CM

## 2022-10-20 DIAGNOSIS — E11.65 TYPE 2 DIABETES MELLITUS WITH HYPERGLYCEMIA, WITH LONG-TERM CURRENT USE OF INSULIN: ICD-10-CM

## 2022-10-20 DIAGNOSIS — E11.65 TYPE 2 DIABETES MELLITUS WITH HYPERGLYCEMIA, WITH LONG-TERM CURRENT USE OF INSULIN: Primary | ICD-10-CM

## 2022-10-20 DIAGNOSIS — R94.6 ABNORMAL THYROID FUNCTION TEST: ICD-10-CM

## 2022-10-20 DIAGNOSIS — Z79.4 INSULIN LONG-TERM USE: ICD-10-CM

## 2022-10-20 LAB
EXPIRATION DATE: NORMAL
EXPIRATION DATE: NORMAL
GLUCOSE BLDC GLUCOMTR-MCNC: 88 MG/DL (ref 70–130)
HBA1C MFR BLD: 6.9 %
Lab: NORMAL
Lab: NORMAL

## 2022-10-20 PROCEDURE — 3044F HG A1C LEVEL LT 7.0%: CPT | Performed by: INTERNAL MEDICINE

## 2022-10-20 PROCEDURE — 83036 HEMOGLOBIN GLYCOSYLATED A1C: CPT | Performed by: INTERNAL MEDICINE

## 2022-10-20 PROCEDURE — 80053 COMPREHEN METABOLIC PANEL: CPT

## 2022-10-20 PROCEDURE — 82947 ASSAY GLUCOSE BLOOD QUANT: CPT | Performed by: INTERNAL MEDICINE

## 2022-10-20 PROCEDURE — 86341 ISLET CELL ANTIBODY: CPT

## 2022-10-20 PROCEDURE — 36415 COLL VENOUS BLD VENIPUNCTURE: CPT

## 2022-10-20 PROCEDURE — 99204 OFFICE O/P NEW MOD 45 MIN: CPT | Performed by: INTERNAL MEDICINE

## 2022-10-20 NOTE — PROGRESS NOTES
"     Office Note      Date: 10/20/2022  Patient Name: Soledad Quigley  MRN: [id number]  : 2000    Chief Complaint   Patient presents with   • Diabetes       History of Present Illness:   Soledad Quigley is a 22 y.o. female who presents for Diabetes type 2. Diagnosed in: . Treated in past with insulin. She reports h/o gestational DM treated with diet about 3 years that cleared.  Current treatments: metformin and basal bolus insulin. Number of insulin shots per day: 4. Checks blood sugar 3 times a day. Has low blood sugar: occasional. Aspirin use: No -  . Statin use: No -  . ACE-I/ARB use: No -  .  Last eye exam: prior to diagnosis.    She presented to ER recently with polyuria and blurred vision.  She was found to have DKA with positive ketones, wide anion gap and arterial pH of 7.2.  She had some DM education in the hospital.  She was started on basal bolus insulin.    Subjective      Diabetic Complications:  Eyes: No  Kidneys: No  Feet: No  Heart: No    Diet and Exercise:  Meals per day: 3  Minutes of exercise per week: 0 mins.  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    Review of Systems:   Review of Systems   Constitutional: Positive for fatigue.   HENT: Negative.    Eyes: Negative.    Respiratory: Negative.    Cardiovascular: Negative.    Gastrointestinal: Negative.    Endocrine: Negative.    Genitourinary: Negative.    Musculoskeletal: Negative.    Skin: Negative.    Allergic/Immunologic: Positive for environmental allergies.   Neurological: Negative.    Hematological: Negative.    Psychiatric/Behavioral: Negative.        The following portions of the patient's history were reviewed and updated as appropriate: allergies, current medications, past family history, past medical history, past social history, past surgical history and problem list.    Objective     Visit Vitals  /75   Pulse 77   Ht 162.6 cm (64\")   Wt 99.3 kg (219 lb)   SpO2 98%   BMI 37.59 kg/m²       Physical Exam:  Physical " Exam  Constitutional:       Appearance: Normal appearance. She is obese.   HENT:      Head: Normocephalic and atraumatic.   Eyes:      Extraocular Movements: Extraocular movements intact.      Conjunctiva/sclera: Conjunctivae normal.      Pupils: Pupils are equal, round, and reactive to light.   Neck:      Thyroid: No thyroid mass, thyromegaly or thyroid tenderness.   Cardiovascular:      Rate and Rhythm: Normal rate and regular rhythm.      Pulses: Normal pulses.           Dorsalis pedis pulses are 2+ on the right side and 2+ on the left side.        Posterior tibial pulses are 2+ on the right side and 2+ on the left side.      Heart sounds: Normal heart sounds.   Pulmonary:      Effort: Pulmonary effort is normal.      Breath sounds: Normal breath sounds.   Abdominal:      General: Bowel sounds are normal.      Palpations: Abdomen is soft.   Musculoskeletal:         General: Normal range of motion.      Cervical back: Normal range of motion and neck supple.   Feet:      Right foot:      Protective Sensation: 5 sites tested. 5 sites sensed.      Skin integrity: Skin integrity normal.      Toenail Condition: Right toenails are normal.      Left foot:      Protective Sensation: 5 sites tested. 5 sites sensed.      Skin integrity: Skin integrity normal.      Toenail Condition: Left toenails are normal.   Lymphadenopathy:      Cervical: No cervical adenopathy.   Skin:     General: Skin is warm and dry.   Neurological:      General: No focal deficit present.      Mental Status: She is alert.   Psychiatric:         Mood and Affect: Mood normal.         Behavior: Behavior normal.         Thought Content: Thought content normal.         Judgment: Judgment normal.         Labs:    HbA1c  Hemoglobin A1C   Date Value Ref Range Status   10/20/2022 6.9 % Final   07/07/2022 11.80 (H) 4.80 - 5.60 % Final   .    CMP  Lab Results   Component Value Date    GLUCOSE 187 (H) 07/11/2022    BUN 5 (L) 07/11/2022    CREATININE 0.34 (L)  07/11/2022    EGFRIFNONA 149 03/09/2020    EGFRIFAFRI 172 03/09/2020    BCR 14.7 07/11/2022    K 3.6 07/11/2022    CO2 24.0 07/11/2022    CALCIUM 8.1 (L) 07/11/2022    AST 15 07/07/2022    ALT 21 07/07/2022        Lipid Panel  Lab Results   Component Value Date    CHLPL 118 06/15/2018    HDL 42 06/15/2018    LDL 55 06/15/2018    TRIG 104 06/15/2018        TSH  Lab Results   Component Value Date    TSH 5.370 (H) 07/07/2022    FREET4 0.99 07/07/2022        Hemoglobin A1C  Lab Results   Component Value Date    HGBA1C 6.9 10/20/2022        Microalbumin/Creatinine  No results found for: MALBCRERATI        Assessment / Plan      Assessment & Plan:  Diagnoses and all orders for this visit:    1. Type 2 diabetes mellitus with hyperglycemia, with long-term current use of insulin (HCC) (Primary)  Assessment & Plan:  She has h/o gestational DM but was recently admitted with DKA.  Is this type I or type II DM?  Continue insulin for now.  Check labs to rule out type I DM.  We discussed treatment options depending on lab results.      Orders:  -     POC Glucose, Blood  -     POC Glycosylated Hemoglobin (Hb A1C)  -     Comprehensive Metabolic Panel; Future  -     IA-2 Autoantibodies; Future  -     Glutamic Acid Decarboxylase; Future  -     Ambulatory Referral to Diabetic Education    2. Insulin long-term use (HCC)    3. Abnormal thyroid function test  Assessment & Plan:  TSH in the hospital was mildly out of range.  Plan to recheck next visit.         Return in about 3 months (around 1/20/2023) for Recheck with A1c, CMP, lipids, TSH, microalbumin.    Francesco Christianson MD   10/20/2022

## 2022-10-20 NOTE — ASSESSMENT & PLAN NOTE
She has h/o gestational DM but was recently admitted with DKA.  Is this type I or type II DM?  Continue insulin for now.  Check labs to rule out type I DM.  We discussed treatment options depending on lab results.

## 2022-10-21 ENCOUNTER — DOCUMENTATION (OUTPATIENT)
Dept: DIABETES SERVICES | Facility: HOSPITAL | Age: 22
End: 2022-10-21

## 2022-10-21 LAB
ALBUMIN SERPL-MCNC: 4.6 G/DL (ref 3.5–5.2)
ALBUMIN/GLOB SERPL: 1.4 G/DL
ALP SERPL-CCNC: 75 U/L (ref 39–117)
ALT SERPL W P-5'-P-CCNC: 14 U/L (ref 1–33)
ANION GAP SERPL CALCULATED.3IONS-SCNC: 12.2 MMOL/L (ref 5–15)
AST SERPL-CCNC: 15 U/L (ref 1–32)
BILIRUB SERPL-MCNC: 0.6 MG/DL (ref 0–1.2)
BUN SERPL-MCNC: 12 MG/DL (ref 6–20)
BUN/CREAT SERPL: 25.5 (ref 7–25)
CALCIUM SPEC-SCNC: 9.5 MG/DL (ref 8.6–10.5)
CHLORIDE SERPL-SCNC: 105 MMOL/L (ref 98–107)
CO2 SERPL-SCNC: 26.8 MMOL/L (ref 22–29)
CREAT SERPL-MCNC: 0.47 MG/DL (ref 0.57–1)
EGFRCR SERPLBLD CKD-EPI 2021: 136.5 ML/MIN/1.73
GLOBULIN UR ELPH-MCNC: 3.2 GM/DL
GLUCOSE SERPL-MCNC: 58 MG/DL (ref 65–99)
POTASSIUM SERPL-SCNC: 4.1 MMOL/L (ref 3.5–5.2)
PROT SERPL-MCNC: 7.8 G/DL (ref 6–8.5)
SODIUM SERPL-SCNC: 144 MMOL/L (ref 136–145)

## 2022-10-25 LAB — GAD65 AB SER IA-ACNC: 104 U/ML (ref 0–5)

## 2022-10-26 PROBLEM — E10.65 TYPE 1 DIABETES MELLITUS WITH HYPERGLYCEMIA: Status: ACTIVE | Noted: 2022-10-20

## 2022-10-27 LAB — ISLET CELL512 AB SER-ACNC: >120 U/ML

## 2023-02-16 DIAGNOSIS — F41.9 ANXIETY: ICD-10-CM

## 2023-02-16 RX ORDER — INSULIN DETEMIR 100 [IU]/ML
INJECTION, SOLUTION SUBCUTANEOUS
Qty: 24 ML | OUTPATIENT
Start: 2023-02-16

## 2023-02-16 RX ORDER — ESCITALOPRAM OXALATE 5 MG/1
TABLET ORAL
Qty: 30 TABLET | Refills: 0 | OUTPATIENT
Start: 2023-02-16

## 2023-02-16 NOTE — TELEPHONE ENCOUNTER
Rx Refill Note  Requested Prescriptions     Pending Prescriptions Disp Refills   • escitalopram (LEXAPRO) 5 MG tablet [Pharmacy Med Name: ESCITALOPRAM 5 MG TABLET] 30 tablet 0     Sig: TAKE ONE TABLET BY MOUTH DAILY   • Levemir FlexTouch 100 UNIT/ML injection [Pharmacy Med Name: LEVEMIR FLEXTOUCH 100 UNIT/ML] 24 mL      Sig: INJECT 26 UNITS UNDER THE SKIN INTO THE APPROPRIATE AREA EVERY NIGHT AS DIRECTED      Last office visit with prescribing clinician: 7/18/2022   Last telemedicine visit with prescribing clinician: Visit date not found   Next office visit with prescribing clinician: Visit date not found                         Would you like a call back once the refill request has been completed: [] Yes [] No    If the office needs to give you a call back, can they leave a voicemail: [] Yes [] No    Gregorio Fermin, BRAEDEN  02/16/23, 10:33 EST

## 2023-02-17 RX ORDER — INSULIN DETEMIR 100 [IU]/ML
26 INJECTION, SOLUTION SUBCUTANEOUS NIGHTLY
Qty: 9 ML | Refills: 0 | Status: SHIPPED | OUTPATIENT
Start: 2023-02-17 | End: 2023-03-01

## 2023-02-17 NOTE — TELEPHONE ENCOUNTER
Rx Refill Note  Requested Prescriptions     Pending Prescriptions Disp Refills   • insulin detemir (Levemir FlexTouch) 100 UNIT/ML injection 15 mL 3     Sig: Inject 26 Units under the skin into the appropriate area as directed Every Night     Refused Prescriptions Disp Refills   • escitalopram (LEXAPRO) 5 MG tablet [Pharmacy Med Name: ESCITALOPRAM 5 MG TABLET] 30 tablet 0     Sig: TAKE ONE TABLET BY MOUTH DAILY     Refused By: RADHA ESPANA     Reason for Refusal: Patient needs an appointment   • Levemir FlexTouch 100 UNIT/ML injection [Pharmacy Med Name: LEVEMIR FLEXTOUCH 100 UNIT/ML] 24 mL      Sig: INJECT 26 UNITS UNDER THE SKIN INTO THE APPROPRIATE AREA EVERY NIGHT AS DIRECTED     Refused By: RADHA ESPANA     Reason for Refusal: Patient needs an appointment      Last office visit with prescribing clinician: 7/18/2022   Last telemedicine visit with prescribing clinician: 2/24/2023   Next office visit with prescribing clinician: 2/24/2023    I called pt to get schedule she stated she only has one dose left on this pen and I told her I would send in a request but she would need to keep her apt with you for more refills     Venessa Callejas MA  02/17/23, 11:48 EST

## 2023-02-24 ENCOUNTER — OFFICE VISIT (OUTPATIENT)
Dept: INTERNAL MEDICINE | Facility: CLINIC | Age: 23
End: 2023-02-24
Payer: MEDICAID

## 2023-02-24 VITALS
HEART RATE: 85 BPM | DIASTOLIC BLOOD PRESSURE: 84 MMHG | TEMPERATURE: 99.6 F | SYSTOLIC BLOOD PRESSURE: 122 MMHG | OXYGEN SATURATION: 98 %

## 2023-02-24 DIAGNOSIS — F41.9 ANXIETY: Primary | ICD-10-CM

## 2023-02-24 DIAGNOSIS — N94.10 DYSPAREUNIA IN FEMALE: ICD-10-CM

## 2023-02-24 DIAGNOSIS — R05.1 ACUTE COUGH: ICD-10-CM

## 2023-02-24 DIAGNOSIS — U07.1 COVID-19: ICD-10-CM

## 2023-02-24 DIAGNOSIS — E10.9 TYPE 1 DIABETES MELLITUS WITHOUT COMPLICATION: ICD-10-CM

## 2023-02-24 LAB
EXPIRATION DATE: ABNORMAL
FLUAV AG UPPER RESP QL IA.RAPID: NOT DETECTED
FLUBV AG UPPER RESP QL IA.RAPID: NOT DETECTED
INTERNAL CONTROL: ABNORMAL
Lab: ABNORMAL
SARS-COV-2 AG UPPER RESP QL IA.RAPID: DETECTED

## 2023-02-24 PROCEDURE — 87428 SARSCOV & INF VIR A&B AG IA: CPT | Performed by: NURSE PRACTITIONER

## 2023-02-24 PROCEDURE — 99214 OFFICE O/P EST MOD 30 MIN: CPT | Performed by: NURSE PRACTITIONER

## 2023-02-24 RX ORDER — BENZONATATE 100 MG/1
100 CAPSULE ORAL 3 TIMES DAILY PRN
Qty: 30 CAPSULE | Refills: 0 | Status: SHIPPED | OUTPATIENT
Start: 2023-02-24

## 2023-02-24 RX ORDER — ESCITALOPRAM OXALATE 5 MG/1
5 TABLET ORAL DAILY
Qty: 90 TABLET | Refills: 1 | Status: SHIPPED | OUTPATIENT
Start: 2023-02-24

## 2023-02-24 NOTE — PROGRESS NOTES
"Chief Complaint   Patient presents with   • Follow-up     Follow up type I DM   • Cough     Cough, congestion, slightly productive cough, yellow/green phlegm, patient states that she \"feels hot,\" HA, started with sore throat X 2 days ago     Subjective   Soledad Quigley is a 22 y.o. female.     History of Present Illness     Patient presents to follow-up for diabetes.  Recently diagnosed with type I through endocrinology Dr Christianson.  She has an upcoming appointment with endocrinology 3/1/23.  Her last A1c was 6.9% under goal.  Denies dizziness, lightheadedness, polyuria, polyphagia, polydipsia. Currently takes 26 units of Levemir nightly and 7 units of Humalog 3 times a day with meals.    Last visit she was prescribed Lexapro, 30-day supply was given.  Patient did not follow-up.  She felt like when she was taking the medicine her anxiety was more controlled and she would like to restart.  She did not have any side effects while taking it.    Acute complaints include the cough has been ongoing for the past 2 days.  Slightly productive.  Yellow-green phlegm.  Patient states she feels like she has a fever like she feels hot, but she does not have a fever.  She has a headache.  She initially started with a sore throat that is now resolved.  No nausea, vomiting, diarrhea, shortness of breath, chest pain, abdominal pain.  No sick contacts.    Patient is due an annual exam and Pap smear.  Patient states that she does need to get a Pap smear she has developed pain with intercourse.  She has PCOS.  Since diagnosis of type 1 diabetes her periods have been more regular.      The following portions of the patient's history were reviewed and updated as appropriate: allergies, current medications, past family history, past medical history, past social history, past surgical history and problem list.      Objective   /84 (BP Location: Right arm, Patient Position: Sitting, Cuff Size: Adult)   Pulse 85   Temp 99.6 °F (37.6 °C) " (Temporal)   LMP 02/20/2023 (Exact Date)   SpO2 98%   There is no height or weight on file to calculate BMI.  Physical Exam  Vitals and nursing note reviewed.   Constitutional:       General: She is not in acute distress.     Appearance: Normal appearance. She is not diaphoretic.      Interventions: Face mask in place.   HENT:      Right Ear: Tympanic membrane, ear canal and external ear normal.      Left Ear: Tympanic membrane, ear canal and external ear normal.      Nose: Congestion and rhinorrhea present. Rhinorrhea is clear.      Right Sinus: No maxillary sinus tenderness or frontal sinus tenderness.      Left Sinus: No maxillary sinus tenderness or frontal sinus tenderness.      Mouth/Throat:      Lips: Pink.      Mouth: Mucous membranes are moist.      Pharynx: Oropharynx is clear. No oropharyngeal exudate or posterior oropharyngeal erythema.   Eyes:      Extraocular Movements: Extraocular movements intact.   Cardiovascular:      Rate and Rhythm: Normal rate and regular rhythm.   Pulmonary:      Effort: Pulmonary effort is normal. No respiratory distress.      Breath sounds: Normal breath sounds. No wheezing or rhonchi.   Musculoskeletal:         General: Normal range of motion.      Cervical back: Normal range of motion and neck supple.   Lymphadenopathy:      Cervical: No cervical adenopathy.   Skin:     General: Skin is warm and dry.      Coloration: Skin is not pale.      Findings: No rash.   Neurological:      Mental Status: She is alert and oriented to person, place, and time.   Psychiatric:         Mood and Affect: Mood normal.         Labs:  Results for orders placed or performed in visit on 02/24/23   POCT SARS-CoV-2 Antigen JESSICA + Flu    Specimen: Swab   Result Value Ref Range    SARS Antigen Detected (A) Not Detected, Presumptive Negative    Influenza A Antigen JESSICA Not Detected Not Detected    Influenza B Antigen JESSICA Not Detected Not Detected    Internal Control Passed Passed    Lot Number  [id number]     Expiration Date 1,122,024        Assessment & Plan   Soledad Quigley is here today and the following problems have been addressed:      Diagnoses and all orders for this visit:    1. Anxiety (Primary)  -     escitalopram (LEXAPRO) 5 MG tablet; Take 1 tablet by mouth Daily.  Dispense: 90 tablet; Refill: 1  - Reinitiate Lexapro. This medication can take 6-8 weeks before you notice full benefit. Take every day at the same time. Initial side effects include nausea, headache, dizziness, lightheadedness, insomnia, and worsening anxiety in the first 1-2 weeks but this should improve. Consider counseling. No SI. If having suicidal thoughts or plan, stop medication, go to ER or call 911.  Follow up 8 weeks     2. Dyspareunia in female  -     Ambulatory Referral to Gynecology  - Due pap and started having pain with intercourse lately, referral to GYN placed     3. COVID-19  -     benzonatate (Tessalon Perles) 100 MG capsule; Take 1 capsule by mouth 3 (Three) Times a Day As Needed for Cough.  Dispense: 30 capsule; Refill: 0  -     POCT SARS-CoV-2 Antigen JESSICA + Flu  - You tested positive for COVID-19.  You will need to quarantine 5 days from onset of symptoms and can leave quarantine as long as you are fever free with symptom improvement.  Use one area of the home for you - bedroom and bathroom to yourself. Stay 6 feet away from others in the home. If you are in a common area, you should stay 6 feet away and both be wearing a mask. You will need to wear a mask for 5 additional days after your quarantine. Recommended to quarantine for up to 10 days if you are having worsening symptoms/fever.  Sent cough medication to pharmacy.  Encouraged Mucinex with lots of water, rest, deep breathing exercises, saline rinses. Continue tylenol prn for fever, body aches, headaches.  Notify others that you may have exposed and have them quarantine and test appropriately based on their vaccination status.  Go to the ER if any  respiratory distress.    4. Acute cough  -     benzonatate (Tessalon Perles) 100 MG capsule; Take 1 capsule by mouth 3 (Three) Times a Day As Needed for Cough.  Dispense: 30 capsule; Refill: 0  -     POCT SARS-CoV-2 Antigen JESSICA + Flu  - See above plan.     5. Type 1 diabetes mellitus without complication (HCC)  -     Keep follow-up with endocrinology on 3/1/2023.  Continue to take insulin as prescribed.  Check feet daily.  Stay on top of annual eye exams.  Last A1c was under goal.    Follow Up   Return in about 8 weeks (around 4/21/2023) for Annual and follow up anxiety .  Patient was given instructions and counseling regarding her condition or for health maintenance advice. Please see specific information pulled into the AVS if appropriate.     Tawnya CURTIS  [facility] [place]

## 2023-03-01 ENCOUNTER — OFFICE VISIT (OUTPATIENT)
Dept: ENDOCRINOLOGY | Facility: CLINIC | Age: 23
End: 2023-03-01
Payer: MEDICAID

## 2023-03-01 VITALS
HEART RATE: 80 BPM | SYSTOLIC BLOOD PRESSURE: 118 MMHG | DIASTOLIC BLOOD PRESSURE: 78 MMHG | BODY MASS INDEX: 35.34 KG/M2 | HEIGHT: 64 IN | WEIGHT: 207 LBS | OXYGEN SATURATION: 99 %

## 2023-03-01 DIAGNOSIS — R94.6 ABNORMAL THYROID FUNCTION TEST: ICD-10-CM

## 2023-03-01 DIAGNOSIS — E10.65 TYPE 1 DIABETES MELLITUS WITH HYPERGLYCEMIA: Primary | ICD-10-CM

## 2023-03-01 LAB
ALBUMIN SERPL-MCNC: 4.3 G/DL (ref 3.5–5.2)
ALBUMIN UR-MCNC: <1.2 MG/DL
ALBUMIN/GLOB SERPL: 1.3 G/DL
ALP SERPL-CCNC: 90 U/L (ref 39–117)
ALT SERPL W P-5'-P-CCNC: 14 U/L (ref 1–33)
ANION GAP SERPL CALCULATED.3IONS-SCNC: 9 MMOL/L (ref 5–15)
AST SERPL-CCNC: 17 U/L (ref 1–32)
BILIRUB SERPL-MCNC: 0.8 MG/DL (ref 0–1.2)
BUN SERPL-MCNC: 14 MG/DL (ref 6–20)
BUN/CREAT SERPL: 29.8 (ref 7–25)
CALCIUM SPEC-SCNC: 9.4 MG/DL (ref 8.6–10.5)
CHLORIDE SERPL-SCNC: 101 MMOL/L (ref 98–107)
CHOLEST SERPL-MCNC: 132 MG/DL (ref 0–200)
CO2 SERPL-SCNC: 30 MMOL/L (ref 22–29)
CREAT SERPL-MCNC: 0.47 MG/DL (ref 0.57–1)
CREAT UR-MCNC: 100.9 MG/DL
EGFRCR SERPLBLD CKD-EPI 2021: 136.5 ML/MIN/1.73
EXPIRATION DATE: ABNORMAL
EXPIRATION DATE: NORMAL
GLOBULIN UR ELPH-MCNC: 3.3 GM/DL
GLUCOSE BLDC GLUCOMTR-MCNC: 140 MG/DL (ref 70–130)
GLUCOSE SERPL-MCNC: 110 MG/DL (ref 65–99)
HBA1C MFR BLD: 10.2 %
HDLC SERPL-MCNC: 45 MG/DL (ref 40–60)
LDLC SERPL CALC-MCNC: 69 MG/DL (ref 0–100)
LDLC/HDLC SERPL: 1.52 {RATIO}
Lab: ABNORMAL
Lab: NORMAL
MICROALBUMIN/CREAT UR: NORMAL MG/G{CREAT}
POTASSIUM SERPL-SCNC: 3.7 MMOL/L (ref 3.5–5.2)
PROT SERPL-MCNC: 7.6 G/DL (ref 6–8.5)
SODIUM SERPL-SCNC: 140 MMOL/L (ref 136–145)
TRIGL SERPL-MCNC: 93 MG/DL (ref 0–150)
TSH SERPL DL<=0.05 MIU/L-ACNC: 2.57 UIU/ML (ref 0.27–4.2)
VLDLC SERPL-MCNC: 18 MG/DL (ref 5–40)

## 2023-03-01 PROCEDURE — 84443 ASSAY THYROID STIM HORMONE: CPT | Performed by: INTERNAL MEDICINE

## 2023-03-01 PROCEDURE — 80061 LIPID PANEL: CPT | Performed by: INTERNAL MEDICINE

## 2023-03-01 PROCEDURE — 82043 UR ALBUMIN QUANTITATIVE: CPT | Performed by: INTERNAL MEDICINE

## 2023-03-01 PROCEDURE — 83036 HEMOGLOBIN GLYCOSYLATED A1C: CPT | Performed by: INTERNAL MEDICINE

## 2023-03-01 PROCEDURE — 80053 COMPREHEN METABOLIC PANEL: CPT | Performed by: INTERNAL MEDICINE

## 2023-03-01 PROCEDURE — 3046F HEMOGLOBIN A1C LEVEL >9.0%: CPT | Performed by: INTERNAL MEDICINE

## 2023-03-01 PROCEDURE — 99214 OFFICE O/P EST MOD 30 MIN: CPT | Performed by: INTERNAL MEDICINE

## 2023-03-01 PROCEDURE — 82947 ASSAY GLUCOSE BLOOD QUANT: CPT | Performed by: INTERNAL MEDICINE

## 2023-03-01 PROCEDURE — 82570 ASSAY OF URINE CREATININE: CPT | Performed by: INTERNAL MEDICINE

## 2023-03-01 RX ORDER — BLOOD-GLUCOSE SENSOR
1 EACH MISCELLANEOUS
Qty: 2 EACH | Refills: 5 | Status: SHIPPED | OUTPATIENT
Start: 2023-03-01

## 2023-03-01 RX ORDER — INSULIN LISPRO 100 [IU]/ML
15 INJECTION, SOLUTION INTRAVENOUS; SUBCUTANEOUS
Qty: 15 ML | Refills: 5 | Status: SHIPPED | OUTPATIENT
Start: 2023-03-01

## 2023-03-01 RX ORDER — INSULIN DETEMIR 100 [IU]/ML
30 INJECTION, SOLUTION SUBCUTANEOUS NIGHTLY
Qty: 15 ML | Refills: 5 | Status: SHIPPED | OUTPATIENT
Start: 2023-03-01

## 2023-03-01 NOTE — ASSESSMENT & PLAN NOTE
Diabetes is worsening.   Increase insulin.  Okay to stop metformin.  She will need to learn carb counting.  Will refer for DM education.  Diabetes will be reassessed in 3 months.    We discussed CGM today.  Will send Rx for this.

## 2023-03-01 NOTE — PROGRESS NOTES
"     Office Note      Date: 2023  Patient Name: Soledad Quigley  MRN: [id number]  : 2000    Chief Complaint   Patient presents with   • Diabetes       History of Present Illness:   Soledad Quigley is a 22 y.o. female who presents for Diabetes type 1. Diagnosed in: . Treated in past with insulin. She reports h/o gestational DM treated with diet about 3 years that cleared.  Current treatments: metformin and basal bolus insulin. Number of insulin shots per day: 4. Checks blood sugar 3 times a day. Has low blood sugar: occasional. Aspirin use: No -  . Statin use: No -  . ACE-I/ARB use: No -  .  Last eye exam: prior to diagnosis.    Subjective      Diabetic Complications:  Eyes: No  Kidneys: No  Feet: No  Heart: No    Diet and Exercise:  Meals per day: 3  Minutes of exercise per week: 0 mins.    Review of Systems:   Review of Systems   Constitutional: Negative.    Cardiovascular: Negative.    Gastrointestinal: Negative.    Endocrine: Negative.        The following portions of the patient's history were reviewed and updated as appropriate: allergies, current medications, past family history, past medical history, past social history, past surgical history and problem list.    Objective       Visit Vitals  /78   Pulse 80   Ht 162.6 cm (64\")   Wt 93.9 kg (207 lb)   LMP 2023 (Exact Date)   SpO2 99%   BMI 35.53 kg/m²       Physical Exam:  Physical Exam  Constitutional:       Appearance: Normal appearance.   Neurological:      Mental Status: She is alert.         Labs:    HbA1c  Lab Results   Component Value Date    HGBA1C 10.2 2023       CMP  Lab Results   Component Value Date    GLUCOSE 58 (L) 10/20/2022    BUN 12 10/20/2022    CREATININE 0.47 (L) 10/20/2022    EGFRIFNONA 149 2020    EGFRIFAFRI 172 2020    BCR 25.5 (H) 10/20/2022    K 4.1 10/20/2022    CO2 26.8 10/20/2022    CALCIUM 9.5 10/20/2022    AST 15 10/20/2022    ALT 14 10/20/2022        Lipid Panel  Lab Results   Component " Value Date    CHLPL 118 06/15/2018    HDL 42 06/15/2018    LDL 55 06/15/2018    TRIG 104 06/15/2018        TSH  Lab Results   Component Value Date    TSH 5.370 (H) 07/07/2022    FREET4 0.99 07/07/2022        Hemoglobin A1C  Lab Results   Component Value Date    HGBA1C 10.2 03/01/2023        Microalbumin/Creatinine  Lab Results   Component Value Date    CREATINIURIN >25.0 03/10/2020           Assessment / Plan      Assessment & Plan:  Diagnoses and all orders for this visit:    1. Type 1 diabetes mellitus with hyperglycemia (HCC) (Primary)  Assessment & Plan:  Diabetes is worsening.   Increase insulin.  Okay to stop metformin.  She will need to learn carb counting.  Will refer for DM education.  Diabetes will be reassessed in 3 months.    We discussed CGM today.  Will send Rx for this.    Orders:  -     POC Glucose, Blood  -     POC Glycosylated Hemoglobin (Hb A1C)  -     Comprehensive Metabolic Panel; Future  -     Lipid Panel; Future  -     Microalbumin / Creatinine Urine Ratio - Urine, Clean Catch; Future  -     Ambulatory Referral to Diabetic Education    2. Abnormal thyroid function test  Assessment & Plan:  Check TSH today.  Will send note about results.    Orders:  -     TSH; Future    Other orders  -     Continuous Blood Gluc Sensor (FreeStyle Carl 3 Sensor) misc; 1 each Every 14 (Fourteen) Days.  Dispense: 2 each; Refill: 5  -     insulin detemir (Levemir FlexTouch) 100 UNIT/ML injection; Inject 30 Units under the skin into the appropriate area as directed Every Night. Titrate to fasting glucose <120.  Max daily dose 50u.  Dispense: 15 mL; Refill: 5  -     Insulin Lispro, 1 Unit Dial, (HumaLOG KwikPen) 100 UNIT/ML solution pen-injector; Inject 15 Units under the skin into the appropriate area as directed 3 (Three) Times a Day With Meals. Use 1u per 5g CHO with meals; max daily dose 50u  Dispense: 15 mL; Refill: 5    Current Outpatient Medications   Medication Instructions   • Alcohol Swabs (Alcohol Pads) 70  % pads Apply one alcohol swab to injection site of skin immediately prior to insulin injection.   • benzonatate (TESSALON PERLES) 100 mg, Oral, 3 Times Daily PRN   • Blood Glucose Monitoring Suppl (FreeStyle Lite) w/Device kit Use as directed to check blood sugar   • Continuous Blood Gluc Sensor (FreeStyle Carl 3 Sensor) misc 1 each, Does not apply, Every 14 Days   • escitalopram (LEXAPRO) 5 mg, Oral, Daily   • glucose blood test strip Use to test blood glucose up to four times daily as needed.   • Insulin Lispro (1 Unit Dial) (HUMALOG KWIKPEN) 15 Units, Subcutaneous, 3 Times Daily With Meals, Use 1u per 5g CHO with meals; max daily dose 50u   • Insulin Pen Needle (Pen Needles) 32G X 4 MM misc 1 each, Subcutaneous, 4 Times Daily PRN   • Lancets (freestyle) lancets Use 1 each 4 (Four) Times a Day.   • Levemir FlexTouch 30 Units, Subcutaneous, Nightly, Titrate to fasting glucose <120.  Max daily dose 50u.   • Nutritional Supplements (Glucose Management) tablet Use as needed for emergently low blood glucose levels. Formulary Compliance Approval      Return in about 3 months (around 6/1/2023) for Recheck with A1c.    Francesco Christianson MD   03/01/2023

## 2023-03-09 DIAGNOSIS — F41.9 ANXIETY: ICD-10-CM

## 2023-03-10 ENCOUNTER — TELEPHONE (OUTPATIENT)
Dept: INTERNAL MEDICINE | Facility: CLINIC | Age: 23
End: 2023-03-10
Payer: MEDICAID

## 2023-03-10 ENCOUNTER — HOSPITAL ENCOUNTER (OUTPATIENT)
Dept: DIABETES SERVICES | Facility: HOSPITAL | Age: 23
Setting detail: RECURRING SERIES
Discharge: HOME | End: 2023-03-10
Payer: MEDICAID

## 2023-03-10 PROCEDURE — G0108 DIAB MANAGE TRN  PER INDIV: HCPCS

## 2023-03-10 RX ORDER — METFORMIN HYDROCHLORIDE 500 MG/1
TABLET, EXTENDED RELEASE ORAL
Qty: 90 TABLET | OUTPATIENT
Start: 2023-03-10

## 2023-03-10 RX ORDER — ESCITALOPRAM OXALATE 5 MG/1
TABLET ORAL
Qty: 30 TABLET | OUTPATIENT
Start: 2023-03-10

## 2023-03-10 NOTE — CONSULTS
Mrs. Quigley attended a 2 hour initial diabetes education class today in person. Epic users see media tab. All other users will receive notes per usual. Thank you.

## 2023-03-10 NOTE — TELEPHONE ENCOUNTER
Rx Refill Note  Requested Prescriptions     Refused Prescriptions Disp Refills   • escitalopram (LEXAPRO) 5 MG tablet [Pharmacy Med Name: ESCITALOPRAM 5 MG TABLET] 30 tablet      Sig: TAKE ONE TABLET BY MOUTH DAILY   • metFORMIN ER (GLUCOPHAGE-XR) 500 MG 24 hr tablet [Pharmacy Med Name: METFORMIN HCL  MG TABLET] 90 tablet      Sig: TAKE ONE TABLET BY MOUTH DAILY WITH BREAKFAST      Last office visit with prescribing clinician: 2/24/2023   Last telemedicine visit with prescribing clinician: 4/24/2023   Next office visit with prescribing clinician: 4/24/2023                         Would you like a call back once the refill request has been completed: [] Yes [] No    If the office needs to give you a call back, can they leave a voicemail: [] Yes [] No    Joselyn Moses LPN  03/10/23, 12:22 EST

## 2023-04-10 RX ORDER — INSULIN LISPRO 100 [IU]/ML
15 INJECTION, SOLUTION INTRAVENOUS; SUBCUTANEOUS
Qty: 15 ML | Refills: 5 | Status: SHIPPED | OUTPATIENT
Start: 2023-04-10

## 2023-04-12 ENCOUNTER — OFFICE VISIT (OUTPATIENT)
Dept: OBSTETRICS AND GYNECOLOGY | Facility: CLINIC | Age: 23
End: 2023-04-12
Payer: MEDICAID

## 2023-04-12 VITALS — WEIGHT: 219.8 LBS | BODY MASS INDEX: 37.73 KG/M2 | SYSTOLIC BLOOD PRESSURE: 102 MMHG | DIASTOLIC BLOOD PRESSURE: 68 MMHG

## 2023-04-12 DIAGNOSIS — N76.0 ACUTE VAGINITIS: Primary | ICD-10-CM

## 2023-04-12 DIAGNOSIS — N94.10 FEMALE DYSPAREUNIA: ICD-10-CM

## 2023-04-12 DIAGNOSIS — Z01.419 ENCOUNTER FOR GYNECOLOGICAL EXAMINATION WITHOUT ABNORMAL FINDING: ICD-10-CM

## 2023-04-12 NOTE — PROGRESS NOTES
Subjective   Chief Complaint   Patient presents with   • Gynecologic Exam     New Patient here for Yearly exam and pap smear.Complains of vaginal pain during sex.     Soledad Quigley is a 22 y.o. year old  (C/S x1).  Patient's last menstrual period was 2023.  She presents to be seen because of annual exam and dyspareunia (feels more vaginal--going on for 3 month).   No current birth control _ got pregnant on the pill- caused mental issues as well  DM I started in the past year  OTHER COMPLAINTS:  Nothing else    The following portions of the patient's history were reviewed and updated as appropriate:  She  has a past medical history of Abnormal Pap smear of cervix, Anemia, Anxiety, Asthma, Depression, Gestational diabetes, Gestational hypertension, third trimester, Hypothyroid, Obesity (BMI 35.0-39.9 without comorbidity), PCOS (polycystic ovarian syndrome), Polycystic ovary syndrome, Type 2 diabetes mellitus with hyperglycemia (10/20/2022), and Vitamin D deficiency.  She does not have any pertinent problems on file.  She  has a past surgical history that includes  section.  Her family history includes Arthritis in her father and mother; Breast cancer in her mother; Cancer in her mother and paternal grandfather; Diabetes in her father; Diabetes type I in her paternal grandmother; Hyperlipidemia in her father; Mental illness in her mother; Other in her father, mother, and paternal grandfather.  She  reports that she has never smoked. She has never used smokeless tobacco. She reports that she does not drink alcohol and does not use drugs.  Current Outpatient Medications   Medication Sig Dispense Refill   • Alcohol Swabs (Alcohol Pads) 70 % pads Apply one alcohol swab to injection site of skin immediately prior to insulin injection. 100 each 12   • Blood Glucose Monitoring Suppl (FreeStyle Lite) w/Device kit Use as directed to check blood sugar 1 kit 0   • Continuous Blood Gluc Sensor (FreeStyle Carl 3  Sensor) misc 1 each Every 14 (Fourteen) Days. 2 each 5   • escitalopram (LEXAPRO) 5 MG tablet Take 1 tablet by mouth Daily. 90 tablet 1   • glucose blood test strip Use to test blood glucose up to four times daily as needed. 100 each 12   • insulin detemir (Levemir FlexTouch) 100 UNIT/ML injection Inject 30 Units under the skin into the appropriate area as directed Every Night. Titrate to fasting glucose <120.  Max daily dose 50u. 15 mL 5   • Insulin Lispro, 1 Unit Dial, (HumaLOG KwikPen) 100 UNIT/ML solution pen-injector Inject 15 Units under the skin into the appropriate area as directed 3 (Three) Times a Day With Meals. Use 1u per 5g CHO with meals; max daily dose 50u 15 mL 5   • Insulin Pen Needle (Pen Needles) 32G X 4 MM misc Inject 1 each under the skin into the appropriate area as directed 4 (Four) Times a Day As Needed (for insulin injections). 100 each 12   • Lancets (freestyle) lancets Use 1 each 4 (Four) Times a Day. 100 each 12   • Nutritional Supplements (Glucose Management) tablet Use as needed for emergently low blood glucose levels. Formulary Compliance Approval 30 tablet 0   • benzonatate (Tessalon Perles) 100 MG capsule Take 1 capsule by mouth 3 (Three) Times a Day As Needed for Cough. (Patient not taking: Reported on 4/12/2023) 30 capsule 0     No current facility-administered medications for this visit.     Current Outpatient Medications on File Prior to Visit   Medication Sig   • Alcohol Swabs (Alcohol Pads) 70 % pads Apply one alcohol swab to injection site of skin immediately prior to insulin injection.   • Blood Glucose Monitoring Suppl (FreeStyle Lite) w/Device kit Use as directed to check blood sugar   • Continuous Blood Gluc Sensor (FreeStyle Carl 3 Sensor) misc 1 each Every 14 (Fourteen) Days.   • escitalopram (LEXAPRO) 5 MG tablet Take 1 tablet by mouth Daily.   • glucose blood test strip Use to test blood glucose up to four times daily as needed.   • insulin detemir (Levemir FlexTouch)  100 UNIT/ML injection Inject 30 Units under the skin into the appropriate area as directed Every Night. Titrate to fasting glucose <120.  Max daily dose 50u.   • Insulin Lispro, 1 Unit Dial, (HumaLOG KwikPen) 100 UNIT/ML solution pen-injector Inject 15 Units under the skin into the appropriate area as directed 3 (Three) Times a Day With Meals. Use 1u per 5g CHO with meals; max daily dose 50u   • Insulin Pen Needle (Pen Needles) 32G X 4 MM misc Inject 1 each under the skin into the appropriate area as directed 4 (Four) Times a Day As Needed (for insulin injections).   • Lancets (freestyle) lancets Use 1 each 4 (Four) Times a Day.   • Nutritional Supplements (Glucose Management) tablet Use as needed for emergently low blood glucose levels. Formulary Compliance Approval   • benzonatate (Tessalon Perles) 100 MG capsule Take 1 capsule by mouth 3 (Three) Times a Day As Needed for Cough. (Patient not taking: Reported on 4/12/2023)     No current facility-administered medications on file prior to visit.     She is allergic to sulfa antibiotics and bactrim [sulfamethoxazole-trimethoprim].    Social History    Tobacco Use      Smoking status: Never      Smokeless tobacco: Never    Review of Systems  Consitutional POS: nothing reported    NEG: anorexia or night sweats   Gastointestinal POS: nothing reported    NEG: bloating, change in bowel habits, melena or reflux symptoms   Genitourinary POS: nothing reported    NEG: dysuria or hematuria   Integument POS: nothing reported    NEG: moles that are changing in size, shape, color or rashes   Breast POS: nothing reported    NEG: persistent breast lump, skin dimpling or nipple discharge         Respiratory: negative  Cardiovascular: negative          Objective   /68   Wt 99.7 kg (219 lb 12.8 oz)   LMP 03/24/2023   BMI 37.73 kg/m²     General:  well developed; well nourished  no acute distress   Skin:  No suspicious lesions seen   Thyroid: normal to inspection and palpation    Lungs:  breathing is unlabored  clear to auscultation bilaterally   Heart:  regular rate and rhythm, S1, S2 normal, no murmur, click, rub or gallop   Breasts:  Examined in supine position  Symmetric without masses or skin dimpling  Nipples normal without inversion, lesions or discharge  There are no palpable axillary nodes   Abdomen: soft, non-tender; no masses  no umbilical or inguinal hernias are present  no hepato-splenomegaly   Pelvis: Clinical staff was present for exam  External genitalia:  normal appearance of the external genitalia including Bartholin's and [place]'s glands.  :  urethral meatus normal;  Vaginal:  normal pink mucosa without prolapse or lesions.  Cervix:  normal appearance.  Uterus:  normal size, shape and consistency.  Adnexa:  normal bimanual exam of the adnexa.  Rectal:  digital rectal exam not performed; anus visually normal appearing.     Psychiatric: Alert and oriented ×3, mood and affect appropriate  HEENT: Atraumatic, normocephalic, normal scleral icterus  Extremities: 2+ pulses bilaterally, no edema      Lab Review   a1c 10    Imaging   No data reviewed        Assessment   1. PE WNL  2. DMI  3. dysparuenia-3 months in total.  Pain is actually more deep on palpation rather than vaginal.  There is no sidewall tenderness.     Plan   1. Pap smear done with culture  2.  operative report  3. TVS 2 to 4-week    No orders of the defined types were placed in this encounter.         This note was electronically signed.      2023

## 2023-04-14 ENCOUNTER — PATIENT ROUNDING (BHMG ONLY) (OUTPATIENT)
Dept: OBSTETRICS AND GYNECOLOGY | Facility: CLINIC | Age: 23
End: 2023-04-14
Payer: MEDICAID

## 2023-04-14 LAB
A VAGINAE DNA VAG QL NAA+PROBE: NORMAL SCORE
BVAB2 DNA VAG QL NAA+PROBE: NORMAL SCORE
C ALBICANS DNA VAG QL NAA+PROBE: NEGATIVE
C GLABRATA DNA VAG QL NAA+PROBE: NEGATIVE
C TRACH DNA VAG QL NAA+PROBE: NEGATIVE
MEGA1 DNA VAG QL NAA+PROBE: NORMAL SCORE
N GONORRHOEA DNA VAG QL NAA+PROBE: NEGATIVE
REF LAB TEST METHOD: NORMAL
T VAGINALIS DNA VAG QL NAA+PROBE: NEGATIVE

## 2023-04-14 NOTE — PROGRESS NOTES
April 14, 2023    Hello, may I speak with Soledad Quigley?    My name is Jade Zacarias    I am  with [facility]  [address], [address]  [place] [facility] [address] [phone].    Before we get started may I verify your date of birth? 2000    I am calling to officially welcome you to our practice and ask about your recent visit. Is this a good time to talk? Yes    Tell me about your visit with us. What things went well?  Dr. Rose was wonderful.  It was a really good visit.     We're always looking for ways to make our patients' experiences even better. Do you have recommendations on ways we may improve?  No    Overall were you satisfied with your first visit to our practice? Yes       I appreciate you taking the time to speak with me today. Is there anything else I can do for you? No    Thank you, and have a great day.

## 2023-05-10 ENCOUNTER — OFFICE VISIT (OUTPATIENT)
Dept: OBSTETRICS AND GYNECOLOGY | Facility: CLINIC | Age: 23
End: 2023-05-10
Payer: MEDICAID

## 2023-05-10 VITALS — BODY MASS INDEX: 38.21 KG/M2 | SYSTOLIC BLOOD PRESSURE: 140 MMHG | WEIGHT: 222.6 LBS | DIASTOLIC BLOOD PRESSURE: 64 MMHG

## 2023-05-10 DIAGNOSIS — N94.10 FEMALE DYSPAREUNIA: Primary | ICD-10-CM

## 2023-05-10 NOTE — PROGRESS NOTES
Subjective   Chief Complaint   Patient presents with   • Follow-up     Follow up [place]     Soledad Quigley is a 22 y.o. year old .  Patient's last menstrual period was 2023 (exact date).  She presents to be seen because of follow-up of deep dyspareunia.  Part medical history significant for recent diagnosis of diabetes A1c 10 in the fall.  Dude in July for repeat A1c.  Dyspareunia has been ongoing for about 3 and half months..   Cycles are regular though spaced since DM   NOrmal C?S op report  OTHER COMPLAINTS:  Nothing else    The following portions of the patient's history were reviewed and updated as appropriate:  She  has a past medical history of Abnormal Pap smear of cervix, Anemia, Anxiety, Asthma, Depression, Gestational diabetes, Gestational hypertension, third trimester, Hypothyroid, Obesity (BMI 35.0-39.9 without comorbidity), PCOS (polycystic ovarian syndrome), Polycystic ovary syndrome, Type 2 diabetes mellitus with hyperglycemia (10/20/2022), and Vitamin D deficiency.  She does not have any pertinent problems on file.  She  has a past surgical history that includes  section.  Her family history includes Arthritis in her father and mother; Breast cancer in her mother; Cancer in her mother and paternal grandfather; Diabetes in her father; Diabetes type I in her paternal grandmother; Hyperlipidemia in her father; Mental illness in her mother; Other in her father, mother, and paternal grandfather.  She  reports that she has never smoked. She has never used smokeless tobacco. She reports that she does not drink alcohol and does not use drugs.  Current Outpatient Medications   Medication Sig Dispense Refill   • Alcohol Swabs (Alcohol Pads) 70 % pads Apply one alcohol swab to injection site of skin immediately prior to insulin injection. 100 each 12   • benzonatate (Tessalon Perles) 100 MG capsule Take 1 capsule by mouth 3 (Three) Times a Day As Needed for Cough. 30 capsule 0   • Blood Glucose  Monitoring Suppl (FreeStyle Lite) w/Device kit Use as directed to check blood sugar 1 kit 0   • Continuous Blood Gluc Sensor (FreeStyle Carl 3 Sensor) misc 1 each Every 14 (Fourteen) Days. 2 each 5   • escitalopram (LEXAPRO) 5 MG tablet Take 1 tablet by mouth Daily. 90 tablet 1   • glucose blood test strip Use to test blood glucose up to four times daily as needed. 100 each 12   • insulin detemir (Levemir FlexTouch) 100 UNIT/ML injection Inject 30 Units under the skin into the appropriate area as directed Every Night. Titrate to fasting glucose <120.  Max daily dose 50u. 15 mL 5   • Insulin Lispro, 1 Unit Dial, (HumaLOG KwikPen) 100 UNIT/ML solution pen-injector Inject 15 Units under the skin into the appropriate area as directed 3 (Three) Times a Day With Meals. Use 1u per 5g CHO with meals; max daily dose 50u 15 mL 5   • Insulin Pen Needle (Pen Needles) 32G X 4 MM misc Inject 1 each under the skin into the appropriate area as directed 4 (Four) Times a Day As Needed (for insulin injections). 100 each 12   • Lancets (freestyle) lancets Use 1 each 4 (Four) Times a Day. 100 each 12   • Nutritional Supplements (Glucose Management) tablet Use as needed for emergently low blood glucose levels. Formulary Compliance Approval 30 tablet 0     No current facility-administered medications for this visit.     Current Outpatient Medications on File Prior to Visit   Medication Sig   • Alcohol Swabs (Alcohol Pads) 70 % pads Apply one alcohol swab to injection site of skin immediately prior to insulin injection.   • benzonatate (Tessalon Perles) 100 MG capsule Take 1 capsule by mouth 3 (Three) Times a Day As Needed for Cough.   • Blood Glucose Monitoring Suppl (FreeStyle Lite) w/Device kit Use as directed to check blood sugar   • Continuous Blood Gluc Sensor (FreeStyle Carl 3 Sensor) misc 1 each Every 14 (Fourteen) Days.   • escitalopram (LEXAPRO) 5 MG tablet Take 1 tablet by mouth Daily.   • glucose blood test strip Use to test  blood glucose up to four times daily as needed.   • insulin detemir (Levemir FlexTouch) 100 UNIT/ML injection Inject 30 Units under the skin into the appropriate area as directed Every Night. Titrate to fasting glucose <120.  Max daily dose 50u.   • Insulin Lispro, 1 Unit Dial, (HumaLOG KwikPen) 100 UNIT/ML solution pen-injector Inject 15 Units under the skin into the appropriate area as directed 3 (Three) Times a Day With Meals. Use 1u per 5g CHO with meals; max daily dose 50u   • Insulin Pen Needle (Pen Needles) 32G X 4 MM misc Inject 1 each under the skin into the appropriate area as directed 4 (Four) Times a Day As Needed (for insulin injections).   • Lancets (freestyle) lancets Use 1 each 4 (Four) Times a Day.   • Nutritional Supplements (Glucose Management) tablet Use as needed for emergently low blood glucose levels. Formulary Compliance Approval     No current facility-administered medications on file prior to visit.     She is allergic to sulfa antibiotics and bactrim [sulfamethoxazole-trimethoprim].    Social History    Tobacco Use      Smoking status: Never      Smokeless tobacco: Never    Review of Systems  Consitutional POS: nothing reported    NEG: anorexia or night sweats   Gastointestinal POS: nothing reported    NEG: bloating, change in bowel habits, melena or reflux symptoms   Genitourinary POS: nothing reported    NEG: dysuria or hematuria   Integument POS: nothing reported    NEG: moles that are changing in size, shape, color or rashes   Breast POS: nothing reported    NEG: persistent breast lump, skin dimpling or nipple discharge         Respiratory: negative  Cardiovascular: negative          Objective   /64   Wt 101 kg (222 lb 9.6 oz)   LMP 04/28/2023 (Exact Date)   BMI 38.21 kg/m²     General:  well developed; well nourished  no acute distress   Skin:  No suspicious lesions seen   Thyroid: not examined   Lungs:  breathing is unlabored   Heart:  Not performed.   Breasts:  Not  performed.   Abdomen: soft, non-tender; no masses  no umbilical or inguinal hernias are present  no hepato-splenomegaly   Pelvis: Not performed.     Psychiatric: Alert and oriented ×3, mood and affect appropriate  HEENT: Atraumatic, normocephalic, normal scleral icterus  Extremities: 2+ pulses bilaterally, no edema      Lab Review   PAP and STD swabs for GC, chlamydia and trichimoniasis    Imaging   Pelvic ultrasound images independantly reviewed - Uterus is anteverted size and shape are normal.  Endometrium homogeneous 9.6 mm.  2 simple 2 cm cyst on the right ovary.  No solid masses.  Normal left adnexa.        Assessment   1. Deep dyspareunia: Pap ultrasound normal.  2. Diabetes     Plan   1. As patient does not have significant discomfort with certain positions would recommend continued positional changes.  Should dyspareunia be persistent regardless of position return to clinic.  2. Continue to work on glycemic control.  Follow-up regularly with PCP.  Did discuss birth control given patient's recent A1c's and risks of malformations as well as miscarriage.  Using condoms declines other OCPs at this time.    No orders of the defined types were placed in this encounter.         This note was electronically signed.      May 10, 2023

## 2023-05-15 RX ORDER — METFORMIN HYDROCHLORIDE 500 MG/1
TABLET, EXTENDED RELEASE ORAL
Qty: 90 TABLET | Refills: 1 | Status: SHIPPED | OUTPATIENT
Start: 2023-05-15

## 2023-05-15 NOTE — TELEPHONE ENCOUNTER
Rx Refill Note  Requested Prescriptions     Pending Prescriptions Disp Refills   • metFORMIN ER (GLUCOPHAGE-XR) 500 MG 24 hr tablet [Pharmacy Med Name: METFORMIN HCL  MG TABLET] 90 tablet      Sig: TAKE ONE TABLET BY MOUTH DAILY WITH BREAKFAST      Last office visit with prescribing clinician: 2/24/2023   Last telemedicine visit with prescribing clinician: 3/10/2023   Next office visit with prescribing clinician: Visit date not found   Not on med list

## 2023-05-18 ENCOUNTER — TELEPHONE (OUTPATIENT)
Dept: ENDOCRINOLOGY | Facility: CLINIC | Age: 23
End: 2023-05-18
Payer: MEDICAID

## 2023-05-18 NOTE — TELEPHONE ENCOUNTER
Pt called to say that she gets 2 freestyle edwin 3 sensors it what her insurance will pay for one of them the needle was messed up and would not work  Do we have any samples of these?    If not what does she need to do her rx is not do to be refills until 2 weeks from now    pts number  [phone]

## 2023-05-25 RX ORDER — INSULIN DETEMIR 100 [IU]/ML
INJECTION, SOLUTION SUBCUTANEOUS
Qty: 21 ML | OUTPATIENT
Start: 2023-05-25

## 2023-06-19 RX ORDER — INSULIN DETEMIR 100 [IU]/ML
INJECTION, SOLUTION SUBCUTANEOUS
Qty: 21 ML | OUTPATIENT
Start: 2023-06-19

## 2023-07-07 PROBLEM — E66.812 CLASS 2 SEVERE OBESITY WITH SERIOUS COMORBIDITY IN ADULT: Status: ACTIVE | Noted: 2023-07-07

## 2023-07-07 PROBLEM — E66.01 CLASS 2 SEVERE OBESITY WITH SERIOUS COMORBIDITY IN ADULT: Status: ACTIVE | Noted: 2023-07-07

## 2023-08-11 ENCOUNTER — OFFICE VISIT (OUTPATIENT)
Dept: BARIATRICS/WEIGHT MGMT | Facility: CLINIC | Age: 23
End: 2023-08-11
Payer: MEDICAID

## 2023-08-11 VITALS
BODY MASS INDEX: 38.68 KG/M2 | HEIGHT: 64 IN | DIASTOLIC BLOOD PRESSURE: 76 MMHG | RESPIRATION RATE: 16 BRPM | OXYGEN SATURATION: 99 % | SYSTOLIC BLOOD PRESSURE: 120 MMHG | HEART RATE: 75 BPM | WEIGHT: 226.6 LBS

## 2023-08-11 DIAGNOSIS — D64.9 ANEMIA, UNSPECIFIED TYPE: ICD-10-CM

## 2023-08-11 DIAGNOSIS — F32.A DEPRESSION, UNSPECIFIED DEPRESSION TYPE: ICD-10-CM

## 2023-08-11 DIAGNOSIS — E10.65 TYPE 1 DIABETES MELLITUS WITH HYPERGLYCEMIA: ICD-10-CM

## 2023-08-11 DIAGNOSIS — E13.10 DIABETIC KETOACIDOSIS WITHOUT COMA ASSOCIATED WITH OTHER SPECIFIED DIABETES MELLITUS: ICD-10-CM

## 2023-08-11 DIAGNOSIS — K21.9 GASTROESOPHAGEAL REFLUX DISEASE, UNSPECIFIED WHETHER ESOPHAGITIS PRESENT: ICD-10-CM

## 2023-08-11 DIAGNOSIS — J45.909 ASTHMA, UNSPECIFIED ASTHMA SEVERITY, UNSPECIFIED WHETHER COMPLICATED, UNSPECIFIED WHETHER PERSISTENT: ICD-10-CM

## 2023-08-11 DIAGNOSIS — E66.01 CLASS 2 SEVERE OBESITY DUE TO EXCESS CALORIES WITH SERIOUS COMORBIDITY AND BODY MASS INDEX (BMI) OF 39.0 TO 39.9 IN ADULT: ICD-10-CM

## 2023-08-11 DIAGNOSIS — Z51.81 THERAPEUTIC DRUG MONITORING: ICD-10-CM

## 2023-08-11 DIAGNOSIS — E11.65 TYPE 2 DIABETES MELLITUS WITH HYPERGLYCEMIA, UNSPECIFIED WHETHER LONG TERM INSULIN USE: ICD-10-CM

## 2023-08-11 DIAGNOSIS — F41.9 ANXIETY: ICD-10-CM

## 2023-08-11 DIAGNOSIS — E28.2 PCOS (POLYCYSTIC OVARIAN SYNDROME): ICD-10-CM

## 2023-08-11 DIAGNOSIS — E55.9 VITAMIN D DEFICIENCY: ICD-10-CM

## 2023-08-11 DIAGNOSIS — E66.9 OBESITY (BMI 30-39.9): Primary | ICD-10-CM

## 2023-08-11 NOTE — ASSESSMENT & PLAN NOTE
Patient's (Body mass index is 39.51 kg/mý.) indicates that they are morbidly/severely obese (BMI > 40 or > 35 with obesity - related health condition) with health conditions that include diabetes mellitus . Weight is newly identified. BMI  is above average; BMI management plan is completed. We discussed portion control and increasing exercise.     Topics of discussion included obesity as a disease, nutritional education on food groups, exercise, and medications. Patient was instructed in adequate protein, controlled carb and controlled fat intake.   Patient received instructions on using the medicines as a tool in controlling their weight with nutritional and behavioral changes. Risks and benefits were discussed. I believe the potential benefits of medication helping to decrease weight outweighs the risks. Patient is to try nutritonal/behavioral changes only first.   Patient received our clinic education booklet.   Our patient consent form was reviewed including potential risks of weight loss. We also reviewed our confidentiality and HIPPA statements. Patients current FITT score was reviewed along with current capability for exercise tolerance and a patient will work towards a FITT score of:     Frequency   Intensity Time Strength Training   []   0 None  []   0 None  []   0 None  []   0 None    []   1 (1-2x/week) []   1 (light) []   1 (<10 min) []   1 (1x/week)   []   2 (3-5x/week) []   2 (moderate) []   2 (10-20 min) []   2 (2x/week)   []   3 (daily)   []   3 (moderately hard)  []   4 (very hard) []   3 (20-30 min)  []   4 (>30 min) []   3 (3-4x/week)       Patient's past medical history was reviewed in detail and barriers to weight loss were identified and discussed. Past efforts at weight reduction on their own as well as under physician supervision were documented and discussed.  I advised patient to continue routine care with their Primary Care Provider.     Nutritional recommendations and goals were reviewed  including Calories: 1200  daily adjusted for exercise calories burnt, Protein: g daily, Net carbs (total carb - fiber) of 50-75g net per day.    Start to keep a food journal and bring into next visit in 2 weeks for review. Practice the behavioral modification technique of mindful eating. Take one MVI daily and 2000mg fish oil daily. Take other medications and supplements as directed.      Options:   Surgery--may be necessary to get 30% TBW loss.  Unsure if insurance would cover for BMI 39.5 and T1D, but should cover if BMI 40+ or if also HTN/BARB BMI <40.  MWM.    Discussed GLP-1 usage in this type 1 diabetic with her endocrinologist, Dr. Christianson.  He agreed she likely has concomitant insulin resistance (PCOS, central obesity) and was okay with GLP-1 or metformin use.  Has coverage for Ozempic/Soft Sciencejaro.    No contraindication to any other weight loss meds.    Will not check insulin level, as it would be of no utility in this insulin dependent type 1 diabetic.    Check UDS.

## 2023-08-11 NOTE — PROGRESS NOTES
[facility]  [address]  [place] [address]   Office Note      Date: 2023  Patient Name: Soledad Quigley  MRN: [id number]  : 2000  Subjective  Subjective     Chief Complaint  Obesity Management consult, nutrition counseling          Soledad Quigley presents to [facility] for obesity management.    Weight history:  Highest lifetime weight: 285 pounds.   Today's weight is 226.6 lbs      23  0846   Weight: 103 kg (226 lb 9.6 oz)     pounds.   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    Previous diet: counted carbs while pregnant with gestational diabetes.  Lost a lot of weight doing this.  Keto.  Previous Weight loss meds: Saxenda (endocrinologist, while following for hypothyroidism)    Current lifestyle:   The following seem to sabotage weight loss efforts:Lack of time for planning & self, enjoyment of food, and eating late  The patient is motivated to lose weight health, QOL, self-image.      Dx with type 1 diabetes 1 year ago.  Dx at time of DKA and required hospitalization.  Had gestation diabetes with her pregnancy 3 years ago also.  Follows with Dr. Francesco Christianson.    Thinks has opportunity for improvement of anxiety control, thinks could go up on Lexapro.      Exercise: lots of cardio, starting resistance training.    FITT Score Grid        Frequency   Intensity Time Strength Training   []   0 None  []   0 None  []   0 None  []   0 None    []   1 (1-2x/week) []   1 (light) []   1 (<10 min) []   1 (1x/week)   []   2 (3-5x/week) [x]   2 (moderate) []   2 (10-20 min) [x]   2 (2x/week)   [x]   3 (daily)   []   3 (moderately hard)  []   4 (very hard) []   3 (20-30 min)  [x]   4 (>30 min) []   3 (3-4x/week)               Diet recall:     Breakfast: skips sometimes, or homemade protein shake.  If running late, stops at gas station.  Snack: slim Edgard with cheese,  pepperoni with cheese  Lunch: low carb pizza with airfryer (low carb tortilla)  Snack: cottage cheese/fruit  Supper: meat + veg + starch.  Eats later b/c  gets off late.    Beverages: Propel, gatorade zero, powerade zero, Coke zero.      Pancreatitis: no  Glaucoma: no  Headaches: occasional, mild  HTN: no  Heart palpitations: no  Thyroid C cell cancer: no  MEN syndrome personal or family hx: no  Nephrolithiasis: no      Review of Systems   Constitutional:  Positive for fatigue and unexpected weight gain.        Positive for weight gain   HENT:  Negative for trouble swallowing.         Negative for throat swelling   Respiratory:  Negative for shortness of breath and wheezing.         Negative for snoring   Cardiovascular:  Negative for chest pain, palpitations and leg swelling.   Gastrointestinal:  Negative for abdominal pain, constipation, diarrhea, GERD and indigestion.   Endocrine: Negative for cold intolerance, heat intolerance, polydipsia, polyphagia and polyuria.        Negative for loss of hair  Negative for hirsutism     Genitourinary:  Positive for menstrual problem.        Denies menstrual irregularities   Musculoskeletal:  Negative for arthralgias.        Denies exercise limitations  Denies chronic pain   Skin:  Negative for dry skin.        Negative for acne   Neurological:  Negative for headache and memory problem.        Negative for paresthesias   Psychiatric/Behavioral:  Negative for self-injury, sleep disturbance, suicidal ideas and depressed mood. The patient is nervous/anxious.    All other systems reviewed and are negative.    PHQ-9 Total Score: 10     Past Medical History:   Diagnosis Date    Abnormal Pap smear of cervix     Anemia     Anxiety     Asthma     Depression     GERD (gastroesophageal reflux disease)     Gestational diabetes     Gestational diabetes     Gestational hypertension, third trimester     Hypothyroid     Obesity (BMI 35.0-39.9 without comorbidity)     PCOS (polycystic  ovarian syndrome)     Polycystic ovary syndrome     Type 1 diabetes mellitus     Vitamin D deficiency      Past Surgical History:   Procedure Laterality Date     SECTION  2020       Allergies   Allergen Reactions    Sulfa Antibiotics Diarrhea, Nausea And Vomiting and GI Intolerance    Bactrim [Sulfamethoxazole-Trimethoprim] Diarrhea, Nausea And Vomiting and GI Intolerance       Current Outpatient Medications:     Blood Glucose Monitoring Suppl (FreeStyle Lite) w/Device kit, Use as directed to check blood sugar, Disp: 1 kit, Rfl: 0    Continuous Blood Gluc Sensor (FreeStyle Carl 3 Sensor) misc, 1 each Every 14 (Fourteen) Days., Disp: 2 each, Rfl: 5    escitalopram (LEXAPRO) 5 MG tablet, Take 1 tablet by mouth Daily., Disp: 90 tablet, Rfl: 1    glucose blood test strip, Use to test blood glucose up to four times daily as needed., Disp: 100 each, Rfl: 12    insulin detemir (Levemir FlexTouch) 100 UNIT/ML injection, Inject 30 Units under the skin into the appropriate area as directed Every Night. Titrate to fasting glucose <120.  Max daily dose 50u., Disp: 15 mL, Rfl: 5    Insulin Lispro, 1 Unit Dial, (HumaLOG KwikPen) 100 UNIT/ML solution pen-injector, Inject 15 Units under the skin into the appropriate area as directed 3 (Three) Times a Day With Meals. Use 1u per 5g CHO with meals; max daily dose 50u, Disp: 15 mL, Rfl: 5    Insulin Pen Needle (Pen Needles) 32G X 4 MM misc, Inject 1 each under the skin into the appropriate area as directed 4 (Four) Times a Day As Needed (for insulin injections)., Disp: 100 each, Rfl: 12    Lancets (freestyle) lancets, Use 1 each 4 (Four) Times a Day., Disp: 100 each, Rfl: 12    Social History     Socioeconomic History    Marital status:    Tobacco Use    Smoking status: Never    Smokeless tobacco: Never   Vaping Use    Vaping Use: Never used   Substance and Sexual Activity    Alcohol use: Never    Drug use: Never    Sexual activity: Yes     Partners: Male      "Birth control/protection: Condom     Social History     Social History Narrative    Not on file        Family History   Problem Relation Age of Onset    Arthritis Mother     Other Mother         obesity    Mental illness Mother     Cancer Mother         breast    Breast cancer Mother     Depression Mother     Hypertension Mother     Obesity Mother     Sleep apnea Mother     Obesity Father     Hypertension Father     Other Father         obesity    Arthritis Father     Diabetes Father         type 2    Hyperlipidemia Father     No Known Problems Daughter     Arthritis Maternal Aunt     Sleep apnea Maternal Aunt     Obesity Maternal Aunt     Depression Maternal Aunt     Obesity Maternal Uncle     Hypertension Maternal Uncle     Depression Maternal Uncle     Heart disease Maternal Uncle     Depression Paternal Aunt     Arthritis Paternal Aunt     Arthritis Maternal Grandmother     Sleep apnea Maternal Grandmother     Obesity Maternal Grandmother     Heart disease Maternal Grandmother     Hypertension Maternal Grandmother     Depression Maternal Grandmother     Hypertension Maternal Grandfather     Depression Maternal Grandfather     Arthritis Maternal Grandfather     Diabetes Paternal Grandmother     Diabetes type I Paternal Grandmother     Diabetes Paternal Grandfather     Arthritis Paternal Grandfather     Sleep apnea Paternal Grandfather     Obesity Paternal Grandfather     Hypertension Paternal Grandfather     Other Paternal Grandfather         obesity    Cancer Paternal Grandfather         colon    Lung cancer Paternal Grandfather        Objective   Body mass index is 39.51 kg/mý.  Body composition analysis completed and showed:   %body fat: 50.9    Measurements (in inches)  Waist: 51  Neck: 14.5  Chest: 48.5  Hips: 50  Thighs: 34    Vital Signs:   /76 (BP Location: Left arm, Patient Position: Sitting)   Pulse 75   Resp 16   Ht 161.3 cm (63.5\")   Wt 103 kg (226 lb 9.6 oz)   SpO2 99%   BMI 39.51 kg/mý   "   Physical Exam   General appears stated age and normal appearance   HEENT PERRLA, EOM intact, and conjunctivae normal   Chest/lungs Normal rate, Regular rhythm, Breathing is unlabored, and Clear to auscultation bilaterally   Abdomen Soft, normal bowel sounds, without mass or tenderness, Central adiposity, and low transverse C section incision.  Extremities relatively spared of adiposity.   Extremities without edema   Neuro Normal DTRs, Good historian, and No focal deficit   Skin Warm, dry, intact   Psych normal behavior, normal thought content, and normal concentration     Result Review :   The following data was reviewed by: Marta Chiu MD on 08/11/2023:  Common labs          10/20/2022    14:12 10/20/2022    14:46 3/1/2023    15:48 3/1/2023    16:09 7/7/2023    15:22   Common Labs   Glucose  58   110     BUN  12   14     Creatinine  0.47   0.47     Sodium  144   140     Potassium  4.1   3.7     Chloride  105   101     Calcium  9.5   9.4     Albumin  4.60   4.3     Total Bilirubin  0.6   0.8     Alkaline Phosphatase  75   90     AST (SGOT)  15   17     ALT (SGPT)  14   14     Total Cholesterol    132     Triglycerides    93     HDL Cholesterol    45     LDL Cholesterol     69     Hemoglobin A1C 6.9   10.2   6.7    Microalbumin, Urine    <1.2       Lipid Panel          3/1/2023    16:09   Lipid Panel   Total Cholesterol 132    Triglycerides 93    HDL Cholesterol 45    VLDL Cholesterol 18    LDL Cholesterol  69    LDL/HDL Ratio 1.52      TSH          3/1/2023    16:09   TSH   TSH 2.570      A1C Last 3 Results          10/20/2022    14:12 3/1/2023    15:48 7/7/2023    15:22   HGBA1C Last 3 Results   Hemoglobin A1C 6.9  10.2  6.7                  Weight Goals:    1st goal (10% weight loss): 203  2nd goal: 180  3rd goal (healthy fat range): 159 (30% TBW loss).    Assessment / Plan       Diagnoses and all orders for this visit:    1. Obesity (BMI 30-39.9) (Primary)  Assessment & Plan:  Patient's (Body mass  index is 39.51 kg/mý.) indicates that they are morbidly/severely obese (BMI > 40 or > 35 with obesity - related health condition) with health conditions that include diabetes mellitus . Weight is newly identified. BMI  is above average; BMI management plan is completed. We discussed portion control and increasing exercise.     Topics of discussion included obesity as a disease, nutritional education on food groups, exercise, and medications. Patient was instructed in adequate protein, controlled carb and controlled fat intake.   Patient received instructions on using the medicines as a tool in controlling their weight with nutritional and behavioral changes. Risks and benefits were discussed. I believe the potential benefits of medication helping to decrease weight outweighs the risks. Patient is to try nutritonal/behavioral changes only first.   Patient received our clinic education booklet.   Our patient consent form was reviewed including potential risks of weight loss. We also reviewed our confidentiality and HIPPA statements. Patients current FITT score was reviewed along with current capability for exercise tolerance and a patient will work towards a FITT score of:     Frequency   Intensity Time Strength Training   []   0 None  []   0 None  []   0 None  []   0 None    []   1 (1-2x/week) []   1 (light) []   1 (<10 min) []   1 (1x/week)   []   2 (3-5x/week) []   2 (moderate) []   2 (10-20 min) []   2 (2x/week)   []   3 (daily)   []   3 (moderately hard)  []   4 (very hard) []   3 (20-30 min)  []   4 (>30 min) []   3 (3-4x/week)       Patient's past medical history was reviewed in detail and barriers to weight loss were identified and discussed. Past efforts at weight reduction on their own as well as under physician supervision were documented and discussed.  I advised patient to continue routine care with their Primary Care Provider.     Nutritional recommendations and goals were reviewed including Calories:  1200  daily adjusted for exercise calories burnt, Protein: g daily, Net carbs (total carb - fiber) of 50-75g net per day.    Start to keep a food journal and bring into next visit in 2 weeks for review. Practice the behavioral modification technique of mindful eating. Take one MVI daily and 2000mg fish oil daily. Take other medications and supplements as directed.      Options:   Surgery--may be necessary to get 30% TBW loss.  Unsure if insurance would cover for BMI 39.5 and T1D, but should cover if BMI 40+ or if also HTN/BARB BMI <40.  MWM.    Discussed GLP-1 usage in this type 1 diabetic with her endocrinologist, Dr. Christianson.  He agreed she likely has concomitant insulin resistance (PCOS, central obesity) and was okay with GLP-1 or metformin use.  Has coverage for Ozempic/Mountjaro.    No contraindication to any other weight loss meds.    Will not check insulin level, as it would be of no utility in this insulin dependent type 1 diabetic.    Check UDS.      2. Class 2 severe obesity due to excess calories with serious comorbidity and body mass index (BMI) of 39.0 to 39.9 in adult    3. Diabetic ketoacidosis without coma associated with other specified diabetes mellitus    4. Type 1 diabetes mellitus with hyperglycemia    5. PCOS (polycystic ovarian syndrome)    6. Type 2 diabetes mellitus with hyperglycemia, unspecified whether long term insulin use    7. Vitamin D deficiency    8. Gastroesophageal reflux disease, unspecified whether esophagitis present    9. Anemia, unspecified type    10. Depression, unspecified depression type    11. Anxiety    12. Asthma, unspecified asthma severity, unspecified whether complicated, unspecified whether persistent    13. Therapeutic drug monitoring  -     Urine Drug Screen - Urine, Clean Catch        I spent 60 minutes caring for Soledad on this date of service. This time includes time spent by me in the following activities:preparing for the visit, reviewing tests, obtaining  and/or reviewing a separately obtained history, performing a medically appropriate examination and/or evaluation , counseling and educating the patient/family/caregiver, ordering medications, tests, or procedures, referring and communicating with other health care professionals , and documenting information in the medical record  Follow Up   Return in about 2 weeks (around 8/25/2023).  Patient was given instructions and counseling regarding her condition or for health maintenance advice. Please see specific information pulled into the AVS if appropriate.     Marta Chiu MD, FACS, FASMBS    08/11/2023

## 2023-08-25 ENCOUNTER — OFFICE VISIT (OUTPATIENT)
Dept: BARIATRICS/WEIGHT MGMT | Facility: CLINIC | Age: 23
End: 2023-08-25
Payer: MEDICAID

## 2023-08-25 VITALS
DIASTOLIC BLOOD PRESSURE: 86 MMHG | WEIGHT: 223.8 LBS | HEIGHT: 64 IN | BODY MASS INDEX: 38.21 KG/M2 | SYSTOLIC BLOOD PRESSURE: 128 MMHG | RESPIRATION RATE: 16 BRPM | OXYGEN SATURATION: 99 % | HEART RATE: 79 BPM

## 2023-08-25 DIAGNOSIS — E66.9 OBESITY (BMI 30-39.9): ICD-10-CM

## 2023-08-25 DIAGNOSIS — E28.2 PCOS (POLYCYSTIC OVARIAN SYNDROME): ICD-10-CM

## 2023-08-25 DIAGNOSIS — K21.9 GASTROESOPHAGEAL REFLUX DISEASE, UNSPECIFIED WHETHER ESOPHAGITIS PRESENT: ICD-10-CM

## 2023-08-25 DIAGNOSIS — R94.6 ABNORMAL THYROID FUNCTION TEST: ICD-10-CM

## 2023-08-25 DIAGNOSIS — E55.9 VITAMIN D DEFICIENCY: ICD-10-CM

## 2023-08-25 DIAGNOSIS — D64.9 ANEMIA, UNSPECIFIED TYPE: ICD-10-CM

## 2023-08-25 DIAGNOSIS — R53.83 FATIGUE, UNSPECIFIED TYPE: Primary | ICD-10-CM

## 2023-08-25 DIAGNOSIS — E10.65 TYPE 1 DIABETES MELLITUS WITH HYPERGLYCEMIA: ICD-10-CM

## 2023-08-25 DIAGNOSIS — F41.9 ANXIETY: ICD-10-CM

## 2023-08-25 PROBLEM — E11.9 DIABETES TYPE 2, CONTROLLED: Status: ACTIVE | Noted: 2023-08-25

## 2023-08-25 RX ORDER — BUPROPION HYDROCHLORIDE 150 MG/1
TABLET, EXTENDED RELEASE ORAL
Qty: 60 TABLET | Refills: 2 | Status: SHIPPED | OUTPATIENT
Start: 2023-08-25 | End: 2023-11-23

## 2023-08-25 RX ORDER — NALTREXONE HYDROCHLORIDE 50 MG/1
TABLET, FILM COATED ORAL
Qty: 60 TABLET | Refills: 2 | Status: SHIPPED | OUTPATIENT
Start: 2023-08-25 | End: 2023-11-30

## 2023-08-25 RX ORDER — CYANOCOBALAMIN 1000 UG/ML
1000 INJECTION, SOLUTION INTRAMUSCULAR; SUBCUTANEOUS
Status: SHIPPED | OUTPATIENT
Start: 2023-08-25

## 2023-08-25 RX ADMIN — CYANOCOBALAMIN 1000 MCG: 1000 INJECTION, SOLUTION INTRAMUSCULAR; SUBCUTANEOUS at 11:30

## 2023-08-25 NOTE — ASSESSMENT & PLAN NOTE
Patient's (Body mass index is 39.02 kg/mý.) indicates that they are morbidly/severely obese (BMI > 40 or > 35 with obesity - related health condition) with health conditions that include diabetes mellitus . Weight is improving with lifestyle modifications. BMI  is above average; BMI management plan is completed. We discussed portion control and increasing exercise.     I have instructed the patient to continue with pursuit of medical weight loss as a part of this program. Patient does meet criteria for use of anorectics at this time as BMI >30 , body fat percentage >30%, dysmetabolic syndrome, is not at treatment goal, and this medication is indicated for LONG TERM use for management of obesity.     Continue nutritional focus and work towards new exercise FITT goal of:   The current plan for this month includes:   - Continue current exercise efforts  - Weight loss goal 4-6lbs this month  - Continue to work on lifestyle behavioral changes  - Continue nutrition focus  - Adjust macronutrients as discussed. Bring food journal to next visit for review  - Continue to prioritize protein, fiber, and hydration.     Will rx Ozempic starting dose to obtain PA and see if available at local pharmacy.    Also rx bupropion/naltrexone.

## 2023-08-25 NOTE — PROGRESS NOTES
[facility]  [address]  [place] [address]     Office Note      Date: 2023  Patient Name: Soledad Quigley  MRN: [id number]  : 2000  Subjective  Subjective     Chief Complaint  Obesity Management follow-up          Soledad Quigley presents to [facility] for obesity management.   Patient is unsure with weight loss progress. Appetite is moderately controlled. Reports no side effects of prescribed medications today. The patient is taking multivitamin and is not taking fish oil.  The patient is using a food journal.      Has linked her apple watch with [employer], and is trying to take into account her calories burned and strength training done.  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  [employer] .  It has really helped with finding decent foods even if she is eating out.    Realized she had always watched carbs as a diabetic, but never learned to watch the calories.    Diet recall:    Average calories per day: 5731-0206  Average protein per day: 100++  Average carbs per day: <75 net      The patient is exercising with a FITT score of: 10    Frequency Intensity Time Strength Training   []   0, none []   0 []   0 []   0   []   1 (1-2x/week) []   1 (light) []   1 (<10 min) []   1 (1x/week)   [x]   2 (3-5x/week) [x]   2 (moderate) []   2 (10-20 min) [x]   2 (2x/week)   []   3 (daily) []   3 (moderately hard)  []   4 (very hard) []   3 (20-30 min)  [x]   4 (>30 min) []   3 (3-4x/week)       Review of Systems   Constitutional:  Negative for appetite change and fatigue.   Eyes:  Negative for blurred vision, double vision and visual disturbance.   Cardiovascular:  Negative for chest pain and palpitations.   Gastrointestinal:  Negative for abdominal pain, constipation, diarrhea, nausea, vomiting and GERD.   Endocrine: Negative for polydipsia,  polyphagia and polyuria.   Musculoskeletal:  Negative for arthralgias, back pain and myalgias.   Neurological:  Negative for dizziness, tremors, light-headedness, headache and memory problem.        Parasthesias negative   Psychiatric/Behavioral:  Negative for sleep disturbance, depressed mood and stress. The patient is not nervous/anxious.    All other systems reviewed and are negative.    Objective   Start weight: 226.6 pounds.    Today's Weight: 223.8      08/25/23  1127   Weight: 102 kg (223 lb 12.8 oz)     Total Loss lb/%Loss of beginning body weight (BBW): -2.8 lb/-1.24%    Change in weight since last visit: -2.8    Body mass index is 39.02 kg/mý.   Body composition analysis completed and showed:   %body fat: 50.6    Measurements (in inches)  Waist: 49    Allergies   Allergen Reactions    Sulfa Antibiotics Diarrhea, Nausea And Vomiting and GI Intolerance    Bactrim [Sulfamethoxazole-Trimethoprim] Diarrhea, Nausea And Vomiting and GI Intolerance       Current Outpatient Medications:     Blood Glucose Monitoring Suppl (FreeStyle Lite) w/Device kit, Use as directed to check blood sugar, Disp: 1 kit, Rfl: 0    Continuous Blood Gluc Sensor (FreeStyle Carl 3 Sensor) misc, 1 each Every 14 (Fourteen) Days., Disp: 2 each, Rfl: 5    escitalopram (LEXAPRO) 5 MG tablet, Take 1 tablet by mouth Daily., Disp: 90 tablet, Rfl: 1    glucose blood test strip, Use to test blood glucose up to four times daily as needed., Disp: 100 each, Rfl: 12    insulin detemir (Levemir FlexTouch) 100 UNIT/ML injection, Inject 30 Units under the skin into the appropriate area as directed Every Night. Titrate to fasting glucose <120.  Max daily dose 50u., Disp: 15 mL, Rfl: 5    Insulin Lispro, 1 Unit Dial, (HumaLOG KwikPen) 100 UNIT/ML solution pen-injector, Inject 15 Units under the skin into the appropriate area as directed 3 (Three) Times a Day With Meals. Use 1u per 5g CHO with meals; max daily dose 50u, Disp: 15 mL, Rfl: 5    Insulin Pen  "Needle (Pen Needles) 32G X 4 MM misc, Inject 1 each under the skin into the appropriate area as directed 4 (Four) Times a Day As Needed (for insulin injections)., Disp: 100 each, Rfl: 12    Lancets (freestyle) lancets, Use 1 each 4 (Four) Times a Day., Disp: 100 each, Rfl: 12    buPROPion SR (WELLBUTRIN SR) 150 MG 12 hr tablet, Take 1 tablet by mouth Daily for 7 days, THEN 1 tablet 2 (Two) Times a Day for 83 days., Disp: 60 tablet, Rfl: 2    naltrexone (DEPADE) 50 MG tablet, Take 0.5 tablets by mouth Daily for 7 days, THEN 0.5 tablets 2 (Two) Times a Day for 90 days. Take 1/2 tablet for 7 days or until nausea subsides then increase to 1/2 tab bid., Disp: 60 tablet, Rfl: 2    Semaglutide,0.25 or 0.5MG/DOS, (OZEMPIC) 2 MG/3ML solution pen-injector, Inject 0.25 mg under the skin into the appropriate area as directed 1 (One) Time Per Week for 37 days., Disp: 2 mL, Rfl: 0    Current Facility-Administered Medications:     cyanocobalamin injection 1,000 mcg, 1,000 mcg, Intramuscular, Q28 Days, Marta Chiu MD, 1,000 mcg at 23 1130    Past Medical History:   Diagnosis Date    Abnormal Pap smear of cervix     Anemia     Anxiety     Asthma     Depression     Diabetes type 2, controlled     Diabetes type 2, controlled 2023    GERD (gastroesophageal reflux disease)     Gestational diabetes     Gestational diabetes     Gestational hypertension, third trimester     Hypothyroid     Obesity (BMI 35.0-39.9 without comorbidity)     PCOS (polycystic ovarian syndrome)     Polycystic ovary syndrome     Type 1 diabetes mellitus     Vitamin D deficiency      Past Surgical History:   Procedure Laterality Date     SECTION  2020       Vital Signs:   /86 (BP Location: Left arm, Patient Position: Sitting)   Pulse 79   Resp 16   Ht 161.3 cm (63.5\")   Wt 102 kg (223 lb 12.8 oz)   SpO2 99%   BMI 39.02 kg/mý     Physical Exam   General appears stated age and normal appearance   MASON HERNANDEZ " intact, and conjunctivae normal   Chest/lungs Normal rate, Regular rhythm, Breathing is unlabored, and Clear to auscultation bilaterally   Extremities without edema   Neuro Good historian and No focal deficit   Skin Warm, dry, intact   Psych normal behavior, normal thought content, and normal concentration     Result Review :                Assessment / Plan          Diagnoses and all orders for this visit:    1. Fatigue, unspecified type (Primary)  -     cyanocobalamin injection 1,000 mcg    2. Obesity (BMI 30-39.9)  Assessment & Plan:  Patient's (Body mass index is 39.02 kg/mý.) indicates that they are morbidly/severely obese (BMI > 40 or > 35 with obesity - related health condition) with health conditions that include diabetes mellitus . Weight is improving with lifestyle modifications. BMI  is above average; BMI management plan is completed. We discussed portion control and increasing exercise.     I have instructed the patient to continue with pursuit of medical weight loss as a part of this program. Patient does meet criteria for use of anorectics at this time as BMI >30 , body fat percentage >30%, dysmetabolic syndrome, is not at treatment goal, and this medication is indicated for LONG TERM use for management of obesity.     Continue nutritional focus and work towards new exercise FITT goal of:   The current plan for this month includes:   - Continue current exercise efforts  - Weight loss goal 4-6lbs this month  - Continue to work on lifestyle behavioral changes  - Continue nutrition focus  - Adjust macronutrients as discussed. Bring food journal to next visit for review  - Continue to prioritize protein, fiber, and hydration.     Will rx Ozempic starting dose to obtain PA and see if available at local pharmacy.    Also rx bupropion/naltrexone.        3. Abnormal thyroid function test    4. Vitamin D deficiency    5. Type 1 diabetes mellitus with hyperglycemia    6. PCOS (polycystic ovarian syndrome)    7.  Gastroesophageal reflux disease, unspecified whether esophagitis present    8. Anemia, unspecified type    9. Anxiety    Other orders  -     Semaglutide,0.25 or 0.5MG/DOS, (OZEMPIC) 2 MG/3ML solution pen-injector; Inject 0.25 mg under the skin into the appropriate area as directed 1 (One) Time Per Week for 37 days.  Dispense: 2 mL; Refill: 0  -     buPROPion SR (WELLBUTRIN SR) 150 MG 12 hr tablet; Take 1 tablet by mouth Daily for 7 days, THEN 1 tablet 2 (Two) Times a Day for 83 days.  Dispense: 60 tablet; Refill: 2  -     naltrexone (DEPADE) 50 MG tablet; Take 0.5 tablets by mouth Daily for 7 days, THEN 0.5 tablets 2 (Two) Times a Day for 90 days. Take 1/2 tablet for 7 days or until nausea subsides then increase to 1/2 tab bid.  Dispense: 60 tablet; Refill: 2        I spent 30 minutes caring for Soledad on this date of service. This time includes time spent by me in the following activities:preparing for the visit, reviewing tests, obtaining and/or reviewing a separately obtained history, performing a medically appropriate examination and/or evaluation , counseling and educating the patient/family/caregiver, ordering medications, tests, or procedures, referring and communicating with other health care professionals , documenting information in the medical record, and independently interpreting results and communicating that information with the patient/family/caregiver    Follow Up         Return in about 1 month (around 9/25/2023).  Patient was given instructions and counseling regarding her condition or for health maintenance advice. Please see specific information pulled into the AVS if appropriate.     Marta Chiu M.D., FACS, [facility]    08/25/2023

## 2023-08-30 RX ORDER — INSULIN DETEMIR 100 [IU]/ML
INJECTION, SOLUTION SUBCUTANEOUS
Qty: 21 ML | OUTPATIENT
Start: 2023-08-30

## 2023-08-30 RX ORDER — PEN NEEDLE, DIABETIC 32GX 5/32"
NEEDLE, DISPOSABLE MISCELLANEOUS
Qty: 100 EACH | Refills: 12 | OUTPATIENT
Start: 2023-08-30

## 2023-08-30 RX ORDER — BLOOD SUGAR DIAGNOSTIC
STRIP MISCELLANEOUS
OUTPATIENT
Start: 2023-08-30

## 2023-08-30 NOTE — TELEPHONE ENCOUNTER
Rx Refill Note  Requested Prescriptions     Pending Prescriptions Disp Refills    Levemir FlexPen 100 UNIT/ML injection [Pharmacy Med Name: LEVEMIR FLEXPEN 100 UNIT/ML] 21 mL      Sig: INJECT 26 UNITS UNDER THE SKIN INTO THE APPROPRIATE AREA EVERY NIGHT AS DIRECTED    FREESTYLE TEST STRIPS test strip [Pharmacy Med Name: FREESTYLE TEST STRIPS]       Sig: USE TO TEST GLUCOSE UP TO FOUR TIMES A DAY AS NEEDED    [employer] Pen [place] 32G X 4 MM misc [Pharmacy Med Name: KRO PEN NEEDLE 4MM X 32G] 100 each 12     Sig: USE  FOUR TIMES PER DAY AS NEEDED FOR INSULIN INJECTIONS.      Last office visit with prescribing clinician: 2/24/2023   Last telemedicine visit with prescribing clinician: Visit date not found   Next office visit with prescribing clinician: Visit date not found                         Would you like a call back once the refill request has been completed: [] Yes [] No    If the office needs to give you a call back, can they leave a voicemail: [] Yes [] No    Júnior Lezama MA  08/30/23, 15:15 EDT

## 2023-09-14 ENCOUNTER — OFFICE VISIT (OUTPATIENT)
Age: 23
End: 2023-09-14
Payer: MEDICAID

## 2023-09-14 VITALS
DIASTOLIC BLOOD PRESSURE: 84 MMHG | BODY MASS INDEX: 38.07 KG/M2 | HEART RATE: 76 BPM | WEIGHT: 223 LBS | HEIGHT: 64 IN | TEMPERATURE: 99.2 F | OXYGEN SATURATION: 98 % | SYSTOLIC BLOOD PRESSURE: 120 MMHG

## 2023-09-14 DIAGNOSIS — R30.0 DYSURIA: Primary | ICD-10-CM

## 2023-09-14 DIAGNOSIS — N30.00 ACUTE CYSTITIS WITHOUT HEMATURIA: ICD-10-CM

## 2023-09-14 LAB
BILIRUB BLD-MCNC: NEGATIVE MG/DL
CLARITY, POC: ABNORMAL
COLOR UR: YELLOW
EXPIRATION DATE: ABNORMAL
GLUCOSE UR STRIP-MCNC: NEGATIVE MG/DL
KETONES UR QL: NEGATIVE
LEUKOCYTE EST, POC: ABNORMAL
Lab: ABNORMAL
NITRITE UR-MCNC: NEGATIVE MG/ML
PH UR: 6 [PH] (ref 5–8)
PROT UR STRIP-MCNC: ABNORMAL MG/DL
RBC # UR STRIP: ABNORMAL /UL
SP GR UR: 1.02 (ref 1–1.03)
UROBILINOGEN UR QL: NORMAL

## 2023-09-14 RX ORDER — NITROFURANTOIN 25; 75 MG/1; MG/1
100 CAPSULE ORAL 2 TIMES DAILY
Qty: 10 CAPSULE | Refills: 0 | Status: SHIPPED | OUTPATIENT
Start: 2023-09-14

## 2023-09-14 NOTE — PROGRESS NOTES
Follow Up Office Visit      Date: 2023   Patient Name: Soledad Quigley  : 2000   MRN: [id number]     Chief Complaint:    Chief Complaint   Patient presents with    Fever     Urgency to urinate and painful  Burning  Lower back pain   Some abdominal pain  Started last night        History of Present Illness: Soledad Quigley is a 23 y.o. female who is here today for dysuria, lower back pain, abd pain, low grade fever, urinare frequency.  Started last night.    Some nausea (could be due to new medication).      Subjective      Review of Systems:   Review of Systems   Constitutional:  Positive for fever. Negative for appetite change and unexpected weight loss.   HENT:  Negative for trouble swallowing.    Eyes:  Negative for blurred vision and double vision.   Respiratory:  Negative for cough and shortness of breath.    Cardiovascular:  Negative for chest pain and leg swelling.   Gastrointestinal:  Negative for blood in stool.   Endocrine: Negative for cold intolerance, heat intolerance and polyuria.   Musculoskeletal:  Positive for back pain. Negative for joint swelling.   Skin:  Negative for color change and bruise.   Neurological:  Negative for numbness and memory problem.   Hematological:  Does not bruise/bleed easily.   Psychiatric/Behavioral:  Negative for suicidal ideas and depressed mood. The patient is not nervous/anxious.      I have reviewed the patients family history, social history, past medical history, past surgical history and have updated it as appropriate.     Medications:     Current Outpatient Medications:     Blood Glucose Monitoring Suppl (FreeStyle Lite) w/Device kit, Use as directed to check blood sugar, Disp: 1 kit, Rfl: 0    buPROPion SR (WELLBUTRIN SR) 150 MG 12 hr tablet, Take 1 tablet by mouth Daily for 7 days, THEN 1 tablet 2 (Two) Times a Day for 83 days., Disp: 60 tablet, Rfl: 2    Continuous Blood Gluc Sensor (FreeStyle Carl 3 Sensor) misc, 1 each Every 14 (Fourteen) Days.,  Disp: 2 each, Rfl: 5    escitalopram (LEXAPRO) 5 MG tablet, Take 1 tablet by mouth Daily., Disp: 90 tablet, Rfl: 1    glucose blood test strip, Use to test blood glucose up to four times daily as needed., Disp: 100 each, Rfl: 12    insulin detemir (Levemir FlexTouch) 100 UNIT/ML injection, Inject 30 Units under the skin into the appropriate area as directed Every Night. Titrate to fasting glucose <120.  Max daily dose 50u., Disp: 15 mL, Rfl: 5    Insulin Lispro, 1 Unit Dial, (HumaLOG KwikPen) 100 UNIT/ML solution pen-injector, Inject 15 Units under the skin into the appropriate area as directed 3 (Three) Times a Day With Meals. Use 1u per 5g CHO with meals; max daily dose 50u, Disp: 15 mL, Rfl: 5    Insulin Pen Needle (Pen Needles) 32G X 4 MM misc, Inject 1 each under the skin into the appropriate area as directed 4 (Four) Times a Day As Needed (for insulin injections)., Disp: 100 each, Rfl: 12    Lancets (freestyle) lancets, Use 1 each 4 (Four) Times a Day., Disp: 100 each, Rfl: 12    naltrexone (DEPADE) 50 MG tablet, Take 0.5 tablets by mouth Daily for 7 days, THEN 0.5 tablets 2 (Two) Times a Day for 90 days. Take 1/2 tablet for 7 days or until nausea subsides then increase to 1/2 tab bid., Disp: 60 tablet, Rfl: 2    Semaglutide,0.25 or 0.5MG/DOS, (OZEMPIC) 2 MG/3ML solution pen-injector, Inject 0.25 mg under the skin into the appropriate area as directed 1 (One) Time Per Week for 37 days., Disp: 2 mL, Rfl: 0    nitrofurantoin, macrocrystal-monohydrate, (Macrobid) 100 MG capsule, Take 1 capsule by mouth 2 (Two) Times a Day., Disp: 10 capsule, Rfl: 0    Current Facility-Administered Medications:     cyanocobalamin injection 1,000 mcg, 1,000 mcg, Intramuscular, Q28 Days, Marta Chiu MD, 1,000 mcg at 08/25/23 1130    Allergies:   Allergies   Allergen Reactions    Sulfa Antibiotics Diarrhea, Nausea And Vomiting and GI Intolerance    Bactrim [Sulfamethoxazole-Trimethoprim] Diarrhea, Nausea And Vomiting and  "GI Intolerance       Objective     Physical Exam: Please see above  Vital Signs:   Vitals:    09/14/23 1317   BP: 120/84   Pulse: 76   Temp: 99.2 °F (37.3 °C)   SpO2: 98%   Weight: 101 kg (223 lb)   Height: 161.3 cm (63.5\")     Body mass index is 38.88 kg/m².          Physical Exam  Constitutional:       Appearance: Normal appearance.   Cardiovascular:      Rate and Rhythm: Normal rate and regular rhythm.   Pulmonary:      Effort: Pulmonary effort is normal.      Breath sounds: Normal breath sounds.   Abdominal:      General: Abdomen is flat.      Palpations: Abdomen is soft.   Neurological:      Mental Status: She is alert.       Procedures    Results:   Labs:   Hemoglobin A1C   Date Value Ref Range Status   07/07/2023 6.7 % Final   07/07/2022 11.80 (H) 4.80 - 5.60 % Final     TSH   Date Value Ref Range Status   03/01/2023 2.570 0.270 - 4.200 uIU/mL Final        POCT Results (if applicable):   Results for orders placed or performed in visit on 09/14/23   POCT urinalysis dipstick, automated    Specimen: Urine   Result Value Ref Range    Color Yellow Yellow, Straw, Dark Yellow, Candace    Clarity, UA Cloudy (A) Clear    Specific Gravity  1.025 1.005 - 1.030    pH, Urine 6.0 5.0 - 8.0    Leukocytes Small (1+) (A) Negative    Nitrite, UA Negative Negative    Protein, POC Trace (A) Negative mg/dL    Glucose, UA Negative Negative mg/dL    Ketones, UA Negative Negative    Urobilinogen, UA Normal Normal, 0.2 E.U./dL    Bilirubin Negative Negative    Blood, UA 2+ (A) Negative    Lot Number [id number]     Expiration Date 11/21/2024        Imaging:   No valid procedures specified.         Assessment / Plan      Assessment/Plan:   Diagnoses and all orders for this visit:    1. Dysuria (Primary)  -     POCT urinalysis dipstick, automated  -     Urine Culture - Urine, Urine, Clean Catch; Future  -     nitrofurantoin, macrocrystal-monohydrate, (Macrobid) 100 MG capsule; Take 1 capsule by mouth 2 (Two) Times a Day.  Dispense: 10 " capsule; Refill: 0    2. Acute cystitis without hematuria  -     Urine Culture - Urine, Urine, Clean Catch; Future  -     nitrofurantoin, macrocrystal-monohydrate, (Macrobid) 100 MG capsule; Take 1 capsule by mouth 2 (Two) Times a Day.  Dispense: 10 capsule; Refill: 0            Follow Up:   Return for Next scheduled follow up.      Eliseo Hernandez,    [facility]

## 2023-09-30 DIAGNOSIS — F41.9 ANXIETY: ICD-10-CM

## 2023-10-02 RX ORDER — ESCITALOPRAM OXALATE 5 MG/1
TABLET ORAL
Qty: 90 TABLET | Refills: 1 | Status: SHIPPED | OUTPATIENT
Start: 2023-10-02

## 2023-10-13 RX ORDER — BLOOD-GLUCOSE SENSOR
EACH MISCELLANEOUS
Qty: 5 EACH | Refills: 3 | Status: SHIPPED | OUTPATIENT
Start: 2023-10-13

## 2023-10-27 NOTE — TELEPHONE ENCOUNTER
Rx Refill Note  Requested Prescriptions     Pending Prescriptions Disp Refills    Insulin Lispro, 1 Unit Dial, (HUMALOG) 100 UNIT/ML solution pen-injector [Pharmacy Med Name: INSULIN LISPRO 100 UNIT/ML PEN] 15 mL 5     Sig: INJECT 15 UNITS UNDER THE SKIN THREE TIMES A DAY WITH MEALS. USE 1 UNIT PER 5 GRAMS OF CARBOHYDRATES WITH MEALS. MAX DAILY DOSE 50 UNITS          Last office visit with prescribing clinician: 7/7/2023     Next office visit with prescribing clinician: Visit date not found         Fannie Jones MA  10/27/23, 16:22 EDT

## 2023-10-29 RX ORDER — INSULIN LISPRO 100 [IU]/ML
INJECTION, SOLUTION INTRAVENOUS; SUBCUTANEOUS
Qty: 15 ML | Refills: 5 | Status: SHIPPED | OUTPATIENT
Start: 2023-10-29

## 2023-12-04 RX ORDER — PEN NEEDLE, DIABETIC 32GX 5/32"
NEEDLE, DISPOSABLE MISCELLANEOUS
Qty: 100 EACH | Refills: 12 | Status: SHIPPED | OUTPATIENT
Start: 2023-12-04

## 2024-04-01 DIAGNOSIS — F41.9 ANXIETY: ICD-10-CM

## 2024-04-01 RX ORDER — ESCITALOPRAM OXALATE 5 MG/1
5 TABLET ORAL DAILY
Qty: 30 TABLET | Refills: 0 | Status: SHIPPED | OUTPATIENT
Start: 2024-04-01

## 2024-04-01 NOTE — TELEPHONE ENCOUNTER
Rx Refill Note  Requested Prescriptions     Pending Prescriptions Disp Refills    Levemir FlexPen 100 UNIT/ML injection [Pharmacy Med Name: LEVEMIR FLEXPEN 100 UNIT/ML] 15 mL 5     Sig: INJECT UNDER THE SKIN 30 UNITS INTO THE APPROPRIATE AREA AS DIRECTED EVERY NIGHT, TITRATE TO FASTING GLUCOSE LESS THAN 120, MAX DAILY DOSE OF 50 UNITS          Last office visit with prescribing clinician: 7/7/2023     Next office visit with prescribing clinician: Visit date not found         Fannie Jones MA  04/01/24, 16:11 EDT

## 2024-04-01 NOTE — TELEPHONE ENCOUNTER
Rx Refill Note  Requested Prescriptions     Pending Prescriptions Disp Refills    escitalopram (LEXAPRO) 5 MG tablet [Pharmacy Med Name: ESCITALOPRAM 5 MG TABLET] 90 tablet 3     Sig: TAKE 1 TABLET BY MOUTH DAILY      Last office visit with prescribing clinician: 2/24/2023   Last telemedicine visit with prescribing clinician: Visit date not found   Next office visit with prescribing clinician: Visit date not found                         Would you like a call back once the refill request has been completed: [] Yes [] No    If the office needs to give you a call back, can they leave a voicemail: [] Yes [] No    Stewart Lundy MA  04/01/24, 08:19 EDT

## 2024-04-02 RX ORDER — INSULIN DETEMIR 100 [IU]/ML
INJECTION, SOLUTION SUBCUTANEOUS
Qty: 15 ML | Refills: 5 | Status: SHIPPED | OUTPATIENT
Start: 2024-04-02

## 2024-04-29 DIAGNOSIS — F41.9 ANXIETY: ICD-10-CM

## 2024-04-29 RX ORDER — ESCITALOPRAM OXALATE 5 MG/1
TABLET ORAL
Qty: 14 TABLET | Refills: 0 | Status: SHIPPED | OUTPATIENT
Start: 2024-04-29

## 2024-05-14 DIAGNOSIS — F41.9 ANXIETY: ICD-10-CM

## 2024-05-14 RX ORDER — ESCITALOPRAM OXALATE 5 MG/1
TABLET ORAL
Qty: 14 TABLET | Refills: 0 | OUTPATIENT
Start: 2024-05-14

## 2024-08-19 RX ORDER — BLOOD-GLUCOSE SENSOR
EACH MISCELLANEOUS
Qty: 5 EACH | Refills: 3 | Status: SHIPPED | OUTPATIENT
Start: 2024-08-19

## 2024-08-30 DIAGNOSIS — F41.9 ANXIETY: ICD-10-CM

## 2024-08-30 RX ORDER — ESCITALOPRAM OXALATE 5 MG/1
TABLET ORAL
Qty: 7 TABLET | Refills: 0 | Status: SHIPPED | OUTPATIENT
Start: 2024-08-30

## 2024-09-07 DIAGNOSIS — F41.9 ANXIETY: ICD-10-CM

## 2024-09-07 RX ORDER — ESCITALOPRAM OXALATE 5 MG/1
TABLET ORAL
Qty: 7 TABLET | Refills: 0 | OUTPATIENT
Start: 2024-09-07

## 2024-09-17 ENCOUNTER — OFFICE VISIT (OUTPATIENT)
Dept: BARIATRICS/WEIGHT MGMT | Facility: CLINIC | Age: 24
End: 2024-09-17
Payer: MEDICAID

## 2024-09-17 VITALS
SYSTOLIC BLOOD PRESSURE: 130 MMHG | RESPIRATION RATE: 16 BRPM | BODY MASS INDEX: 40.93 KG/M2 | DIASTOLIC BLOOD PRESSURE: 90 MMHG | HEIGHT: 63 IN | OXYGEN SATURATION: 99 % | WEIGHT: 231 LBS | HEART RATE: 84 BPM

## 2024-09-17 DIAGNOSIS — E66.9 OBESITY (BMI 30-39.9): Primary | ICD-10-CM

## 2024-09-17 DIAGNOSIS — E10.9 TYPE 1 DIABETES MELLITUS WITHOUT COMPLICATION: ICD-10-CM

## 2024-09-17 DIAGNOSIS — E55.9 VITAMIN D DEFICIENCY: ICD-10-CM

## 2024-09-17 PROBLEM — E66.813 CLASS 3 SEVERE OBESITY DUE TO EXCESS CALORIES WITH BODY MASS INDEX (BMI) OF 40.0 TO 44.9 IN ADULT: Status: ACTIVE | Noted: 2024-09-17

## 2024-09-17 PROBLEM — E66.01 CLASS 3 SEVERE OBESITY DUE TO EXCESS CALORIES WITH BODY MASS INDEX (BMI) OF 40.0 TO 44.9 IN ADULT: Status: ACTIVE | Noted: 2024-09-17

## 2024-09-19 ENCOUNTER — PRIOR AUTHORIZATION (OUTPATIENT)
Dept: BARIATRICS/WEIGHT MGMT | Facility: CLINIC | Age: 24
End: 2024-09-19
Payer: MEDICAID

## 2024-09-19 LAB
25(OH)D3+25(OH)D2 SERPL-MCNC: 21.3 NG/ML (ref 30–100)
ALBUMIN SERPL-MCNC: 4.4 G/DL (ref 3.5–5.2)
ALBUMIN/GLOB SERPL: 1.5 G/DL
ALP SERPL-CCNC: 86 U/L (ref 39–117)
ALT SERPL-CCNC: 10 U/L (ref 1–33)
AMPHETAMINES UR QL SCN: NEGATIVE NG/ML
AST SERPL-CCNC: 13 U/L (ref 1–32)
BARBITURATES UR QL SCN: NEGATIVE NG/ML
BASOPHILS # BLD AUTO: 0.03 10*3/MM3 (ref 0–0.2)
BASOPHILS NFR BLD AUTO: 0.4 % (ref 0–1.5)
BENZODIAZ UR QL SCN: NEGATIVE NG/ML
BILIRUB SERPL-MCNC: 0.6 MG/DL (ref 0–1.2)
BUN SERPL-MCNC: 13 MG/DL (ref 6–20)
BUN/CREAT SERPL: 26.5 (ref 7–25)
BZE UR QL SCN: NEGATIVE NG/ML
CALCIUM SERPL-MCNC: 9.6 MG/DL (ref 8.6–10.5)
CANNABINOIDS UR QL SCN: NEGATIVE NG/ML
CHLORIDE SERPL-SCNC: 101 MMOL/L (ref 98–107)
CHOLEST SERPL-MCNC: 162 MG/DL (ref 0–200)
CO2 SERPL-SCNC: 23.1 MMOL/L (ref 22–29)
CREAT SERPL-MCNC: 0.49 MG/DL (ref 0.57–1)
CREAT UR-MCNC: 110.3 MG/DL (ref 20–300)
EGFRCR SERPLBLD CKD-EPI 2021: 133.5 ML/MIN/1.73
EOSINOPHIL # BLD AUTO: 0.25 10*3/MM3 (ref 0–0.4)
EOSINOPHIL NFR BLD AUTO: 3.7 % (ref 0.3–6.2)
ERYTHROCYTE [DISTWIDTH] IN BLOOD BY AUTOMATED COUNT: 12.4 % (ref 12.3–15.4)
GLOBULIN SER CALC-MCNC: 3 GM/DL
GLUCOSE SERPL-MCNC: 192 MG/DL (ref 65–99)
HBA1C MFR BLD: 8.3 % (ref 4.8–5.6)
HCT VFR BLD AUTO: 40.7 % (ref 34–46.6)
HDLC SERPL-MCNC: 48 MG/DL (ref 40–60)
HGB BLD-MCNC: 13.4 G/DL (ref 12–15.9)
IMM GRANULOCYTES # BLD AUTO: 0.01 10*3/MM3 (ref 0–0.05)
IMM GRANULOCYTES NFR BLD AUTO: 0.1 % (ref 0–0.5)
LABORATORY COMMENT REPORT: NORMAL
LDLC SERPL CALC-MCNC: 93 MG/DL (ref 0–100)
LYMPHOCYTES # BLD AUTO: 1.93 10*3/MM3 (ref 0.7–3.1)
LYMPHOCYTES NFR BLD AUTO: 28.6 % (ref 19.6–45.3)
MCH RBC QN AUTO: 30.5 PG (ref 26.6–33)
MCHC RBC AUTO-ENTMCNC: 32.9 G/DL (ref 31.5–35.7)
MCV RBC AUTO: 92.5 FL (ref 79–97)
METHADONE UR QL SCN: NEGATIVE NG/ML
MONOCYTES # BLD AUTO: 0.43 10*3/MM3 (ref 0.1–0.9)
MONOCYTES NFR BLD AUTO: 6.4 % (ref 5–12)
NEUTROPHILS # BLD AUTO: 4.09 10*3/MM3 (ref 1.7–7)
NEUTROPHILS NFR BLD AUTO: 60.8 % (ref 42.7–76)
NRBC BLD AUTO-RTO: 0 /100 WBC (ref 0–0.2)
OPIATES UR QL SCN: NEGATIVE NG/ML
OXYCODONE+OXYMORPHONE UR QL SCN: NEGATIVE NG/ML
PCP UR QL: NEGATIVE NG/ML
PH UR: 5.2 [PH] (ref 4.5–8.9)
PLATELET # BLD AUTO: 300 10*3/MM3 (ref 140–450)
POTASSIUM SERPL-SCNC: 4.6 MMOL/L (ref 3.5–5.2)
PROPOXYPH UR QL SCN: NEGATIVE NG/ML
PROT SERPL-MCNC: 7.4 G/DL (ref 6–8.5)
RBC # BLD AUTO: 4.4 10*6/MM3 (ref 3.77–5.28)
SODIUM SERPL-SCNC: 139 MMOL/L (ref 136–145)
TRIGL SERPL-MCNC: 119 MG/DL (ref 0–150)
TSH SERPL DL<=0.005 MIU/L-ACNC: 1.77 UIU/ML (ref 0.27–4.2)
VLDLC SERPL CALC-MCNC: 21 MG/DL (ref 5–40)
WBC # BLD AUTO: 6.74 10*3/MM3 (ref 3.4–10.8)

## 2024-09-20 DIAGNOSIS — F41.9 ANXIETY: ICD-10-CM

## 2024-09-20 RX ORDER — ESCITALOPRAM OXALATE 5 MG/1
TABLET ORAL
Qty: 30 TABLET | Refills: 0 | Status: SHIPPED | OUTPATIENT
Start: 2024-09-20

## 2024-09-22 DIAGNOSIS — E55.9 VITAMIN D DEFICIENCY: Primary | ICD-10-CM

## 2024-09-22 RX ORDER — ERGOCALCIFEROL 1.25 MG/1
50000 CAPSULE, LIQUID FILLED ORAL WEEKLY
Qty: 8 CAPSULE | Refills: 0 | Status: SHIPPED | OUTPATIENT
Start: 2024-09-22

## 2024-09-24 PROBLEM — E66.813 CLASS 3 SEVERE OBESITY WITH BODY MASS INDEX (BMI) OF 40.0 TO 44.9 IN ADULT: Status: ACTIVE | Noted: 2022-07-08

## 2024-09-24 PROBLEM — E66.01 CLASS 3 SEVERE OBESITY WITH BODY MASS INDEX (BMI) OF 40.0 TO 44.9 IN ADULT: Status: ACTIVE | Noted: 2022-07-08

## 2024-10-04 ENCOUNTER — TELEPHONE (OUTPATIENT)
Dept: BARIATRICS/WEIGHT MGMT | Facility: CLINIC | Age: 24
End: 2024-10-04
Payer: MEDICAID

## 2024-10-04 NOTE — TELEPHONE ENCOUNTER
Patient inquired about medication that was sent to the pharmacy for her.  Has not been able to get into her MyChart to review labs    Vitamin D showed LOW, so Kya sent in a one time a week Vitamin D.  Once prescription runs out, she has completed the treatment. Will check labs at later date to ensure in good range.    Patient inquired about A1C as it had not been checked mauro while and is diabetic.  Informed the lab came back as 8.3.    Patient verbalized understanding.  Confirmed appointment with provider on Tuesday at 9am.

## 2024-10-07 RX ORDER — SEMAGLUTIDE 0.25 MG/.5ML
0.25 INJECTION, SOLUTION SUBCUTANEOUS WEEKLY
Qty: 2 ML | Refills: 0 | Status: CANCELLED | OUTPATIENT
Start: 2024-10-07 | End: 2024-11-06

## 2024-10-07 NOTE — PROGRESS NOTES
[facility]  [address]  [place] [address]     Office Note      Date: 10/08/2024  Patient Name: Soledad Quigley  MRN: [id number]  : 2000    Subjective     Chief Complaint  Obesity Management follow-up    Soledad Quigley presents to [facility] for obesity management.       Patient is unsure with weight loss progress. Appetite is well controlled. Reports no side effects of prescribed medications today. The patient is taking multivitamin and is not taking fish oil.  The patient is using a food journal.  The patient rates current efforts as 8 out of 10. Reports that her cravings are decreasing. She is reducing carbohydrates. She is eating out less. She was previously. Reports that her short term insulin needed has reduced due to less carbohydrates. Still has desire to take a GLP-1 and reports that she felt that she was diagnosed with both Type 1 diabetes as well as Type 2 diabetes related to insulin resistance.     The patient is exercising with a FITT score of:    Frequency Intensity Time Strength Training   []   0, none []   0 []   0 [x]   0   [x]   1 (1-2x/week) [x]   1 (light) []   1 (<10 min) []   1 (1x/week)   []   2 (3-5x/week) []   2 (moderate) [x]   2 (10-20 min) []   2 (2x/week)   []   3 (daily) []   3 (moderately hard)  []   4 (very hard) []   3 (20-30 min)  []   4 (>30 min) []   3 (3-4x/week)     Review of Systems   Constitutional:  Negative for appetite change and fatigue.   Eyes:  Negative for visual disturbance.   Cardiovascular:  Negative for chest pain and palpitations.   Gastrointestinal:  Positive for constipation. Negative for indigestion.   Neurological:  Negative for light-headedness.       Objective   Start weight: 231 pounds.    Total Loss lb/%Loss of beginning body weight (BBW): -2.2 lb/-0.95 %  Change in weight since last visit: -2.2 lb    Recent Weight History:   Wt Readings from Last 6 Encounters:   10/08/24  "104 kg (228 lb 12.8 oz)   09/17/24 105 kg (231 lb)   09/14/23 101 kg (223 lb)   08/25/23 102 kg (223 lb 12.8 oz)   08/11/23 103 kg (226 lb 9.6 oz)   07/07/23 103 kg (227 lb 6.4 oz)     Body mass index is 40.53 kg/m².   Body composition analysis completed and showed:   Body Fat %: 51.8    Measurements (in inches)  Waist Circumference: 51    Vital Signs:   /69 (BP Location: Left arm, Patient Position: Sitting)   Pulse 72   Ht 160 cm (63\")   Wt 104 kg (228 lb 12.8 oz)   BMI 40.53 kg/m²       Physical Exam  Vitals reviewed.   Constitutional:       Appearance: She is well-developed.      Comments: overweight   Pulmonary:      Effort: Pulmonary effort is normal.   Neurological:      Mental Status: She is alert.   Psychiatric:         Attention and Perception: Attention normal.         Mood and Affect: Mood normal.         Speech: Speech normal.         Behavior: Behavior normal.        Result Review :     Common labs          9/18/2024    10:20   Common Labs   Glucose 192    BUN 13    Creatinine 0.49    Sodium 139    Potassium 4.6    Chloride 101    Calcium 9.6    Total Protein 7.4    Albumin 4.4    Total Bilirubin 0.6    Alkaline Phosphatase 86    AST (SGOT) 13    ALT (SGPT) 10    WBC 6.74    Hemoglobin 13.4    Hematocrit 40.7    Platelets 300    Total Cholesterol 162    Triglycerides 119    HDL Cholesterol 48    LDL Cholesterol  93    Hemoglobin A1C 8.30            Assessment / Plan        Diagnoses and all orders for this visit:    1. Class 3 severe obesity due to excess calories with serious comorbidity and body mass index (BMI) of 40.0 to 44.9 in adult (Primary)  Assessment & Plan:  Patient's (Body mass index is 40.53 kg/m².) indicates that they are morbidly/severely obese (BMI > 40 or > 35 with obesity - related health condition) with health conditions that include diabetes mellitus . Weight is improving with treatment. BMI  is above average; BMI management plan is completed. We discussed low calorie, low " carb based diet program, portion control, increasing exercise, pharmacologic options including semiglutide, and an lubna-based approach such as [employer] Pal or Lose It.   --This is a follow up visit and she is down around 2 lbs  -- #1 goal over last month was to start to food journal which she has done and brought in for review.  She is journaling most days out of the week and upon review we are noticing many days she is above on calories.  She is staying below her carb goal and above her protein goals most days.  Keep up the great work on journaling with a goal to journal all days this coming month.  She is setting a goal to stay within her calorie goals this month and within water goal and fiber goal as well.  --Currently active in her day-to-day job but has made it to the gym around 1 day a week this month.  Goal to increase this to 2 or more days per week this coming month.  --Discussed that her prior approval was denied.  Looking into if she has a diagnosis of type 2 diabetes in conjunction with her type 1 diabetes for possible resubmission.  Message sent to Dr. Chiu.  We did discuss the possibility of using compounded semaglutide if this is not approved.  Compound consent form signed in office today.  Patient advised that she may call our office if she decides to go that route in the next couple weeks prior to her next office visit.          We discussed the risks, benefits, and limitations of treatments. Continue medications and OTC supplements as discussed. Patient verbalizes understanding of and agreement with management plan.     Follow Up   Return in about 4 weeks (around 11/5/2024).  Patient was given instructions and counseling regarding her condition or for health maintenance advice. Please see specific information pulled into the AVS if appropriate.     I spent 30 minutes on this date of service. This time includes time spent by me in the following activities:preparing for the visit, counseling and  educating the patient/family/caregiver, ordering medications, tests, or procedures and documenting information in the medical record.    Kya Boyce, APRN  10/08/2024

## 2024-10-08 ENCOUNTER — OFFICE VISIT (OUTPATIENT)
Dept: BARIATRICS/WEIGHT MGMT | Facility: CLINIC | Age: 24
End: 2024-10-08
Payer: MEDICAID

## 2024-10-08 VITALS
HEIGHT: 63 IN | WEIGHT: 228.8 LBS | SYSTOLIC BLOOD PRESSURE: 126 MMHG | HEART RATE: 72 BPM | DIASTOLIC BLOOD PRESSURE: 69 MMHG | BODY MASS INDEX: 40.54 KG/M2

## 2024-10-08 DIAGNOSIS — E66.01 CLASS 3 SEVERE OBESITY DUE TO EXCESS CALORIES WITH SERIOUS COMORBIDITY AND BODY MASS INDEX (BMI) OF 40.0 TO 44.9 IN ADULT: Primary | ICD-10-CM

## 2024-10-08 DIAGNOSIS — E66.813 CLASS 3 SEVERE OBESITY DUE TO EXCESS CALORIES WITH SERIOUS COMORBIDITY AND BODY MASS INDEX (BMI) OF 40.0 TO 44.9 IN ADULT: Primary | ICD-10-CM

## 2024-10-08 PROCEDURE — 1159F MED LIST DOCD IN RCRD: CPT | Performed by: NURSE PRACTITIONER

## 2024-10-08 PROCEDURE — 1160F RVW MEDS BY RX/DR IN RCRD: CPT | Performed by: NURSE PRACTITIONER

## 2024-10-08 PROCEDURE — 99214 OFFICE O/P EST MOD 30 MIN: CPT | Performed by: NURSE PRACTITIONER

## 2024-10-08 NOTE — ASSESSMENT & PLAN NOTE
Patient's (Body mass index is 40.53 kg/m².) indicates that they are morbidly/severely obese (BMI > 40 or > 35 with obesity - related health condition) with health conditions that include diabetes mellitus . Weight is improving with treatment. BMI  is above average; BMI management plan is completed. We discussed low calorie, low carb based diet program, portion control, increasing exercise, pharmacologic options including semiglutide, and an lubna-based approach such as [employer] Pal or Lose It.   --This is a follow up visit and she is down around 2 lbs  -- #1 goal over last month was to start to food journal which she has done and brought in for review.  She is journaling most days out of the week and upon review we are noticing many days she is above on calories.  She is staying below her carb goal and above her protein goals most days.  Keep up the great work on journaling with a goal to journal all days this coming month.  She is setting a goal to stay within her calorie goals this month and within water goal and fiber goal as well.  --Currently active in her day-to-day job but has made it to the gym around 1 day a week this month.  Goal to increase this to 2 or more days per week this coming month.  --Discussed that her prior approval was denied.  Looking into if she has a diagnosis of type 2 diabetes in conjunction with her type 1 diabetes for possible resubmission.  Message sent to Dr. Chiu.  We did discuss the possibility of using compounded semaglutide if this is not approved.  Compound consent form signed in office today.  Patient advised that she may call our office if she decides to go that route in the next couple weeks prior to her next office visit.

## 2024-10-15 ENCOUNTER — TELEPHONE (OUTPATIENT)
Dept: BARIATRICS/WEIGHT MGMT | Facility: CLINIC | Age: 24
End: 2024-10-15
Payer: MEDICAID

## 2024-10-15 ENCOUNTER — OFFICE VISIT (OUTPATIENT)
Age: 24
End: 2024-10-15
Payer: MEDICAID

## 2024-10-15 ENCOUNTER — TELEPHONE (OUTPATIENT)
Dept: ENDOCRINOLOGY | Facility: CLINIC | Age: 24
End: 2024-10-15

## 2024-10-15 VITALS
SYSTOLIC BLOOD PRESSURE: 140 MMHG | OXYGEN SATURATION: 97 % | HEIGHT: 63 IN | BODY MASS INDEX: 40.75 KG/M2 | WEIGHT: 230 LBS | HEART RATE: 93 BPM | DIASTOLIC BLOOD PRESSURE: 88 MMHG | TEMPERATURE: 98.9 F

## 2024-10-15 DIAGNOSIS — E55.9 VITAMIN D DEFICIENCY: ICD-10-CM

## 2024-10-15 DIAGNOSIS — N92.6 MISSED MENSES: Primary | ICD-10-CM

## 2024-10-15 DIAGNOSIS — Z3A.01 LESS THAN 8 WEEKS GESTATION OF PREGNANCY: ICD-10-CM

## 2024-10-15 DIAGNOSIS — E10.65 TYPE 1 DIABETES MELLITUS WITH HYPERGLYCEMIA: ICD-10-CM

## 2024-10-15 LAB
B-HCG UR QL: POSITIVE
EXPIRATION DATE: ABNORMAL
INTERNAL NEGATIVE CONTROL: NEGATIVE
INTERNAL POSITIVE CONTROL: POSITIVE
Lab: ABNORMAL

## 2024-10-15 NOTE — TELEPHONE ENCOUNTER
Patient called.  She said she found out last night that she is pregnant.  Putting weight loss on hold for now, obviously.     Seeing provider this afternoon for pregnancy.

## 2024-10-15 NOTE — PROGRESS NOTES
Follow Up Office Visit      Date: 10/15/2024   Patient Name: Soledad Quigley  : 2000   MRN: [id number]     Chief Complaint:    Chief Complaint   Patient presents with    Diabetes      3 positive pregnancy test         History of Present Illness: Soledad Quigley is a 24 y.o. female who is here today for positive pregnancy test.  Patient states that she has had 3 positive pregnancy test at home.  Last day of LMP: 24  Overall states that she has not had any issues at this point she is just wanting to be proactive to make sure there is nothing she needs to be doing given the fact that she does have insulin-dependent diabetes.  States that she has been in contact with her endocrinologist and has a appointment with them for tomorrow.    Subjective      Review of Systems:   Review of Systems   Constitutional:  Negative for appetite change and unexpected weight loss.   HENT:  Negative for trouble swallowing.    Eyes:  Negative for blurred vision and double vision.   Respiratory:  Negative for cough and shortness of breath.    Cardiovascular:  Negative for chest pain and leg swelling.   Gastrointestinal:  Negative for blood in stool.   Endocrine: Negative for cold intolerance, heat intolerance and polyuria.   Musculoskeletal:  Negative for joint swelling.   Skin:  Negative for color change and bruise.   Neurological:  Negative for numbness and memory problem.   Hematological:  Does not bruise/bleed easily.   Psychiatric/Behavioral:  Negative for suicidal ideas and depressed mood. The patient is not nervous/anxious.        I have reviewed the patients family history, social history, past medical history, past surgical history and have updated it as appropriate.     Medications:     Current Outpatient Medications:     Blood Glucose Monitoring Suppl (FreeStyle Lite) w/Device kit, Use as directed to check blood sugar, Disp: 1 kit, Rfl: 0    Continuous Glucose Sensor (FreeStyle Carl 3 Sensor) misc, APPLY ONE TO SKIN  "EVERY 14 DAYS, Disp: 5 each, Rfl: 3    glucose blood test strip, Use to test blood glucose up to four times daily as needed., Disp: 100 each, Rfl: 12    Insulin Lispro, 1 Unit Dial, (HUMALOG) 100 UNIT/ML solution pen-injector, INJECT 15 UNITS UNDER THE SKIN THREE TIMES A DAY WITH MEALS. USE 1 UNIT PER 5 GRAMS OF CARBOHYDRATES WITH MEALS. MAX DAILY DOSE 50 UNITS, Disp: 15 mL, Rfl: 5    [employer] Pen Needles 32G X 4 MM misc, USE  FOUR TIMES PER DAY AS NEEDED FOR INSULIN INJECTIONS., Disp: 100 each, Rfl: 12    Lancets (freestyle) lancets, Use 1 each 4 (Four) Times a Day., Disp: 100 each, Rfl: 12    Levemir FlexPen 100 UNIT/ML injection, INJECT UNDER THE SKIN 30 UNITS INTO THE APPROPRIATE AREA AS DIRECTED EVERY NIGHT, TITRATE TO FASTING GLUCOSE LESS THAN 120, MAX DAILY DOSE OF 50 UNITS, Disp: 15 mL, Rfl: 5    Tirzepatide (Mounjaro) 2.5 MG/0.5ML solution pen-injector pen, Inject 0.5 mL under the skin into the appropriate area as directed 1 (One) Time Per Week for 30 days. (Patient not taking: Reported on 10/15/2024), Disp: 2 mL, Rfl: 0    vitamin D (ERGOCALCIFEROL) 1.25 MG (61923 UT) capsule capsule, Take 1 capsule by mouth 1 (One) Time Per Week. (Patient not taking: Reported on 10/15/2024), Disp: 8 capsule, Rfl: 0    Current Facility-Administered Medications:     cyanocobalamin injection 1,000 mcg, 1,000 mcg, Intramuscular, Q28 Days, Marta Chiu MD, 1,000 mcg at 08/25/23 1130    Allergies:   Allergies   Allergen Reactions    Sulfa Antibiotics Diarrhea, Nausea And Vomiting and GI Intolerance    Bactrim [Sulfamethoxazole-Trimethoprim] Diarrhea, Nausea And Vomiting and GI Intolerance       Objective     Physical Exam: Please see above  Vital Signs:   Vitals:    10/15/24 1443   BP: 140/88   Pulse: 93   Temp: 98.9 °F (37.2 °C)   SpO2: 97%   Weight: 104 kg (230 lb)   Height: 160 cm (63\")     Body mass index is 40.74 kg/m².    Physical Exam  Vitals and nursing note reviewed.   Constitutional:       Appearance: Normal " appearance.   HENT:      Head: Normocephalic and atraumatic.   Eyes:      General: Lids are normal.      Conjunctiva/sclera: Conjunctivae normal.   Cardiovascular:      Rate and Rhythm: Normal rate and regular rhythm.   Pulmonary:      Effort: Pulmonary effort is normal.      Breath sounds: Normal breath sounds and air entry.   Abdominal:      General: Abdomen is flat. Bowel sounds are normal.      Palpations: Abdomen is soft.   Musculoskeletal:      Cervical back: Full passive range of motion without pain and normal range of motion.   Neurological:      General: No focal deficit present.      Mental Status: She is alert and oriented to person, place, and time.   Psychiatric:         Attention and Perception: Attention normal.         Mood and Affect: Mood normal.         Behavior: Behavior normal. Behavior is cooperative.         Procedures    Results:   Labs:   Hemoglobin A1C   Date Value Ref Range Status   09/18/2024 8.30 (H) 4.80 - 5.60 % Final     Comment:     Hemoglobin A1C Ranges:  Increased Risk for Diabetes  5.7% to 6.4%  Diabetes                     >= 6.5%  Diabetic Goal                < 7.0%     07/07/2023 6.7 % Final   07/07/2022 11.80 (H) 4.80 - 5.60 % Final     TSH   Date Value Ref Range Status   09/18/2024 1.770 0.270 - 4.200 uIU/mL Final   03/01/2023 2.570 0.270 - 4.200 uIU/mL Final        POCT Results (if applicable):   Results for orders placed or performed in visit on 10/15/24   POCT pregnancy, urine    Collection Time: 10/15/24  2:55 PM    Specimen: Urine   Result Value Ref Range    HCG, Urine, QL Positive (A) Negative    Lot Number [id number]     Internal Positive Control Positive Positive, Passed    Internal Negative Control Negative Negative, Passed    Expiration Date 10/05/2025        Imaging:   No valid procedures specified.       Assessment / Plan      Assessment/Plan:   Diagnoses and all orders for this visit:    1. Missed menses (Primary)  -     POCT pregnancy, urine    2. Type 1 diabetes  mellitus with hyperglycemia   - What the patient continue her insulin that she is currently on.  She does have an appointment tomorrow with her endocrinologist so we will follow their recommendations and changes.    3. Less than 8 weeks gestation of pregnancy   - Recommend prenatal vitamins.  Patient does have a appointment with her OB scheduled for next month and is already been in contact with their office about this pregnancy.    4. Vitamin D deficiency   - Will have the patient continue vitamin D replacement that has been ordered.  Did review the patient's lab work which did show she is vitamin D deficient.               Vaccine Counseling:      Follow Up:   No follow-ups on file.      Eliseo Hernandez,    [facility]

## 2024-10-15 NOTE — TELEPHONE ENCOUNTER
Caller: Soledad Quigley    Relationship to patient: Self    Best call back number: [phone]     Patient is needing: pt called in stating that she is currently pregnant. Pt took pregnancy test yesterday and today and they were both positive. Pt is wanting to get in sooner to see . Please advise and call back.

## 2024-10-16 ENCOUNTER — OFFICE VISIT (OUTPATIENT)
Dept: ENDOCRINOLOGY | Facility: CLINIC | Age: 24
End: 2024-10-16
Payer: MEDICAID

## 2024-10-16 VITALS
HEIGHT: 63 IN | WEIGHT: 228 LBS | SYSTOLIC BLOOD PRESSURE: 124 MMHG | OXYGEN SATURATION: 100 % | HEART RATE: 80 BPM | BODY MASS INDEX: 40.4 KG/M2 | DIASTOLIC BLOOD PRESSURE: 80 MMHG

## 2024-10-16 DIAGNOSIS — O24.011 PRE-EXISTING TYPE 1 DIABETES MELLITUS DURING PREGNANCY IN FIRST TRIMESTER: ICD-10-CM

## 2024-10-16 DIAGNOSIS — E10.65 TYPE 1 DIABETES MELLITUS WITH HYPERGLYCEMIA: Primary | ICD-10-CM

## 2024-10-16 LAB
EXPIRATION DATE: ABNORMAL
EXPIRATION DATE: ABNORMAL
GLUCOSE BLDC GLUCOMTR-MCNC: 177 MG/DL (ref 70–130)
HBA1C MFR BLD: 8.1 % (ref 4.5–5.7)
Lab: ABNORMAL
Lab: ABNORMAL

## 2024-10-16 PROCEDURE — 82947 ASSAY GLUCOSE BLOOD QUANT: CPT | Performed by: INTERNAL MEDICINE

## 2024-10-16 PROCEDURE — 99214 OFFICE O/P EST MOD 30 MIN: CPT | Performed by: INTERNAL MEDICINE

## 2024-10-16 PROCEDURE — 1159F MED LIST DOCD IN RCRD: CPT | Performed by: INTERNAL MEDICINE

## 2024-10-16 PROCEDURE — 1160F RVW MEDS BY RX/DR IN RCRD: CPT | Performed by: INTERNAL MEDICINE

## 2024-10-16 PROCEDURE — 83036 HEMOGLOBIN GLYCOSYLATED A1C: CPT | Performed by: INTERNAL MEDICINE

## 2024-10-16 PROCEDURE — 3052F HG A1C>EQUAL 8.0%<EQUAL 9.0%: CPT | Performed by: INTERNAL MEDICINE

## 2024-10-16 RX ORDER — INSULIN LISPRO 100 [IU]/ML
INJECTION, SOLUTION INTRAVENOUS; SUBCUTANEOUS
Qty: 90 ML | Refills: 3 | Status: SHIPPED | OUTPATIENT
Start: 2024-10-16

## 2024-10-16 RX ORDER — BLOOD-GLUCOSE SENSOR
1 EACH MISCELLANEOUS
Qty: 6 EACH | Refills: 3 | Status: SHIPPED | OUTPATIENT
Start: 2024-10-16

## 2024-10-16 RX ORDER — INSULIN DETEMIR 100 [IU]/ML
50 INJECTION, SOLUTION SUBCUTANEOUS DAILY
Qty: 90 ML | Refills: 3 | Status: SHIPPED | OUTPATIENT
Start: 2024-10-16

## 2024-10-16 NOTE — PROGRESS NOTES
"     Office Note      Date: 10/16/2024  Patient Name: Soledad Quigley  MRN: [id number]  : 2000    Chief Complaint   Patient presents with    Diabetes     Type I  Patient is currently pregnant       History of Present Illness:   Soledad Quigley is a 24 y.o. female who presents for Diabetes type 1. Diagnosed in: . Treated in past with insulin. She reports h/o gestational DM treated with diet that cleared after delivery.  Current treatments: basal bolus insulin. Number of insulin shots per day: 4. Checks blood sugar 288 times a day - on FreeStyle Carl 3. Has low blood sugar: occasional. Aspirin use: No. Statin use: No. ACE-I/ARB use: No.  Change in health since last visit: none.  Last eye exam: 2024.     She had A1c done last month that was 8.3%.  She has had issues getting the Carl sensors recently.      She called yesterday to report that she had done 2 home pregnancy tests that were positive.  She hasn't seen her Ob yet.  She did see her PCP's office yesterday.    Subjective      Diabetic Complications:  Eyes: No  Kidneys: No  Feet: No  Heart: No    Diet and Exercise:  Meals per day: 3  Minutes of exercise per week: 0 mins.    Review of Systems:   Review of Systems   Constitutional: Negative.    Cardiovascular: Negative.    Gastrointestinal: Negative.    Endocrine: Negative.        The following portions of the patient's history were reviewed and updated as appropriate: allergies, current medications, past family history, past medical history, past social history, past surgical history, and problem list.    Objective     Visit Vitals  /80 (BP Location: Left arm, Patient Position: Sitting, Cuff Size: Adult)   Pulse 80   Ht 160 cm (63\")   Wt 103 kg (228 lb)   LMP 2024   SpO2 100%   BMI 40.39 kg/m²       Physical Exam:  Physical Exam  Constitutional:       Appearance: Normal appearance.   Neurological:      Mental Status: She is alert.         Labs:    HbA1c  Lab Results   Component Value Date    " HGBA1C 8.1 (A) 10/16/2024       CMP  Lab Results   Component Value Date    GLUCOSE 192 (H) 09/18/2024    BUN 13 09/18/2024    CREATININE 0.49 (L) 09/18/2024    EGFRIFNONA 149 03/09/2020    EGFRIFAFRI 172 03/09/2020    BCR 26.5 (H) 09/18/2024    K 4.6 09/18/2024    CO2 23.1 09/18/2024    CALCIUM 9.6 09/18/2024    PROTENTOTREF 7.4 09/18/2024    LABIL2 1.5 09/18/2024    AST 13 09/18/2024    ALT 10 09/18/2024        Lipid Panel  Lab Results   Component Value Date    CHLPL 162 09/18/2024    HDL 48 09/18/2024    LDL 93 09/18/2024    TRIG 119 09/18/2024        TSH  Lab Results   Component Value Date    TSH 1.770 09/18/2024    FREET4 0.99 07/07/2022        Hemoglobin A1C  Lab Results   Component Value Date    HGBA1C 8.1 (A) 10/16/2024        Microalbumin/Creatinine  Lab Results   Component Value Date    MALBCRERATIO  03/01/2023      Comment:      Unable to calculate    CREATINIURIN 110.3 09/18/2024    MICROALBUR <1.2 03/01/2023           Assessment / Plan      Assessment & Plan:  Diagnoses and all orders for this visit:    1. Type 1 diabetes mellitus with hyperglycemia (Primary)  Assessment & Plan:  A1c today above goal.  Continue insulin treatment today.  Adjust basal insulin.  We discussed option of insulin pump treatment.  She would like to consider this.  Will have her meet with our diabetes educator to discuss pump treatment.  Continue CGM.    FreeStyle Carl 3 CGM was downloaded today.  Data was reviewed from 9/19/24 to 10/9/24.  This showed high overnight glucose levels with some improvement during the day.  Time in range was 23%.  Will titrate up basal insulin.    We discussed goals for glucose: fasting <90 and 2 hour postprandial <120.    Will have her contact us weekly so we can pull up the CGM tracings and continue to make adjustments.    Orders:  -     POC Glucose, Blood  -     POC Glycosylated Hemoglobin (Hb A1C)    2. Pre-existing type 1 diabetes mellitus during pregnancy in first trimester  Assessment &  Plan:  We discussed goals for glucose control during pregnancy.  We discussed risk for miscarriage and congenital abnormalities with poorly controlled diabetes in the first trimester.  Will have her meet with CDE today also.      Other orders  -     insulin detemir (Levemir FlexPen) 100 UNIT/ML injection; Inject 50 Units under the skin into the appropriate area as directed Daily. Titate to fasting glucose <90.  Max daily dose 100u.  Dispense: 90 mL; Refill: 3  -     Insulin Lispro, 1 Unit Dial, (HUMALOG) 100 UNIT/ML solution pen-injector; Use as directed 3x daily; max daily dose 100u  Dispense: 90 mL; Refill: 3  -     Continuous Glucose Sensor (FreeStyle Carl 3 Sensor) misc; Use 1 each Every 14 (Fourteen) Days.  Dispense: 6 each; Refill: 3      Current Outpatient Medications   Medication Instructions    Blood Glucose Monitoring Suppl (FreeStyle Lite) w/Device kit Use as directed to check blood sugar    Continuous Glucose Sensor (FreeStyle Carl 3 Sensor) misc 1 each, Does not apply, Every 14 Days    glucose blood test strip Use to test blood glucose up to four times daily as needed.    Insulin Lispro, 1 Unit Dial, (HUMALOG) 100 UNIT/ML solution pen-injector Use as directed 3x daily; max daily dose 100u    [employer] Pen Needles 32G X 4 MM misc USE  FOUR TIMES PER DAY AS NEEDED FOR INSULIN INJECTIONS.    Lancets (freestyle) lancets Use 1 each 4 (Four) Times a Day.    Levemir FlexPen 50 Units, Subcutaneous, Daily, Titate to fasting glucose <90.  Max daily dose 100u.    vitamin D (ERGOCALCIFEROL) 50,000 Units, Oral, Weekly      Return in about 1 month (around 11/16/2024) for Recheck with A1c - schedule with me or PA as needed.    Electronically signed by: Francesco Christianson MD  10/16/2024

## 2024-10-16 NOTE — ASSESSMENT & PLAN NOTE
We discussed goals for glucose control during pregnancy.  We discussed risk for miscarriage and congenital abnormalities with poorly controlled diabetes in the first trimester.  Will have her meet with CDE today also.

## 2024-10-16 NOTE — ASSESSMENT & PLAN NOTE
A1c today above goal.  Continue insulin treatment today.  Adjust basal insulin.  We discussed option of insulin pump treatment.  She would like to consider this.  Will have her meet with our diabetes educator to discuss pump treatment.  Continue CGM.    FreeStyle Carl 3 CGM was downloaded today.  Data was reviewed from 9/19/24 to 10/9/24.  This showed high overnight glucose levels with some improvement during the day.  Time in range was 23%.  Will titrate up basal insulin.    We discussed goals for glucose: fasting <90 and 2 hour postprandial <120.    Will have her contact us weekly so we can pull up the CGM tracings and continue to make adjustments.

## 2024-10-21 ENCOUNTER — TELEPHONE (OUTPATIENT)
Dept: ENDOCRINOLOGY | Facility: CLINIC | Age: 24
End: 2024-10-21

## 2024-10-21 DIAGNOSIS — F41.9 ANXIETY: ICD-10-CM

## 2024-10-21 RX ORDER — INSULIN DETEMIR 100 [IU]/ML
INJECTION, SOLUTION SUBCUTANEOUS
Qty: 15 ML | OUTPATIENT
Start: 2024-10-21

## 2024-10-21 RX ORDER — ESCITALOPRAM OXALATE 5 MG/1
TABLET ORAL
Qty: 30 TABLET | Refills: 0 | OUTPATIENT
Start: 2024-10-21

## 2024-10-21 NOTE — TELEPHONE ENCOUNTER
Patient states she and Dr. Christianson spoke last week at her appointment and she was informed to check with insurance and see what type of insulin pump they would cover, so that Dr. Christianson could start a PA. She has decided that she is wanting to go with the [employer].

## 2024-10-25 ENCOUNTER — TELEPHONE (OUTPATIENT)
Dept: ENDOCRINOLOGY | Facility: CLINIC | Age: 24
End: 2024-10-25
Payer: MEDICAID

## 2024-10-25 RX ORDER — ACYCLOVIR 400 MG/1
1 TABLET ORAL
Qty: 9 EACH | Refills: 4 | Status: SHIPPED | OUTPATIENT
Start: 2024-10-25

## 2024-10-25 NOTE — TELEPHONE ENCOUNTER
Patient states she is pregnant and has an insulin pump. She has been using the freestyle carl 3 sensors but Dr. Christianson had given her a sample of the freestyle carl 3 plus sensor at her last appointment that ended after 7 days. So, after that ended, she put on her carl 3 sensor and it is reading about 30 points or higher than normal. She states they had talked about transitioning her to the Dexcom and she wants to know if Dr. Christianson wants her to go ahead and transition to Dexcom or if he wants her using the Freestyle Carl 3 plus sensor again.     If so, she would like for the prescription to go to [facility] in [place].     Please call pt back at [phone] to confirm

## 2024-11-06 ENCOUNTER — TELEPHONE (OUTPATIENT)
Dept: ENDOCRINOLOGY | Facility: CLINIC | Age: 24
End: 2024-11-06
Payer: MEDICAID

## 2024-11-06 NOTE — TELEPHONE ENCOUNTER
Patient called office, stated that we had called in Dexcom G7 sensors due to her edwin sensors not working. She stated that her pharmacy is saying that it is too early to fill the Dexcom sensors, due to the fact that she just filled the edwin sensors. She is pregnant and is concerned about not having a sensor. She is wanting to know if there is anything we can do on our end so she can get these filled.

## 2024-11-06 NOTE — TELEPHONE ENCOUNTER
Spoke with patient.  Advised that once it was due refill, could fill Dexcom sensors.  Patient verbalized understanding.

## 2024-11-09 ENCOUNTER — HOSPITAL ENCOUNTER (EMERGENCY)
Facility: HOSPITAL | Age: 24
Discharge: HOME | End: 2024-11-09
Attending: EMERGENCY MEDICINE
Payer: MEDICAID

## 2024-11-09 ENCOUNTER — APPOINTMENT (OUTPATIENT)
Dept: ULTRASOUND IMAGING | Facility: HOSPITAL | Age: 24
End: 2024-11-09
Payer: MEDICAID

## 2024-11-09 VITALS
TEMPERATURE: 98.4 F | WEIGHT: 230 LBS | BODY MASS INDEX: 39.27 KG/M2 | HEIGHT: 64 IN | HEART RATE: 80 BPM | OXYGEN SATURATION: 99 % | SYSTOLIC BLOOD PRESSURE: 146 MMHG | DIASTOLIC BLOOD PRESSURE: 90 MMHG | RESPIRATION RATE: 16 BRPM

## 2024-11-09 DIAGNOSIS — O20.9 FIRST TRIMESTER BLEEDING: ICD-10-CM

## 2024-11-09 DIAGNOSIS — Z3A.01 7 WEEKS GESTATION OF PREGNANCY: Primary | ICD-10-CM

## 2024-11-09 DIAGNOSIS — R30.0 DYSURIA: ICD-10-CM

## 2024-11-09 LAB
BACTERIA UR QL AUTO: ABNORMAL /HPF
BASOPHILS # BLD AUTO: 0.03 10*3/MM3 (ref 0–0.2)
BASOPHILS NFR BLD AUTO: 0.3 % (ref 0–1.5)
BILIRUB UR QL STRIP: NEGATIVE
CLARITY UR: ABNORMAL
COLOR UR: YELLOW
DEPRECATED RDW RBC AUTO: 39.7 FL (ref 37–54)
EOSINOPHIL # BLD AUTO: 0.12 10*3/MM3 (ref 0–0.4)
EOSINOPHIL NFR BLD AUTO: 1.2 % (ref 0.3–6.2)
ERYTHROCYTE [DISTWIDTH] IN BLOOD BY AUTOMATED COUNT: 12.3 % (ref 12.3–15.4)
GLUCOSE UR STRIP-MCNC: NEGATIVE MG/DL
HCG INTACT+B SERPL-ACNC: NORMAL MIU/ML
HCT VFR BLD AUTO: 35.7 % (ref 34–46.6)
HGB BLD-MCNC: 12.4 G/DL (ref 12–15.9)
HGB UR QL STRIP.AUTO: ABNORMAL
HYALINE CASTS UR QL AUTO: ABNORMAL /LPF
IMM GRANULOCYTES # BLD AUTO: 0.02 10*3/MM3 (ref 0–0.05)
IMM GRANULOCYTES NFR BLD AUTO: 0.2 % (ref 0–0.5)
KETONES UR QL STRIP: ABNORMAL
LEUKOCYTE ESTERASE UR QL STRIP.AUTO: ABNORMAL
LYMPHOCYTES # BLD AUTO: 1.78 10*3/MM3 (ref 0.7–3.1)
LYMPHOCYTES NFR BLD AUTO: 17.5 % (ref 19.6–45.3)
MCH RBC QN AUTO: 30.5 PG (ref 26.6–33)
MCHC RBC AUTO-ENTMCNC: 34.7 G/DL (ref 31.5–35.7)
MCV RBC AUTO: 87.9 FL (ref 79–97)
MONOCYTES # BLD AUTO: 0.56 10*3/MM3 (ref 0.1–0.9)
MONOCYTES NFR BLD AUTO: 5.5 % (ref 5–12)
MUCOUS THREADS URNS QL MICRO: ABNORMAL /HPF
NEUTROPHILS NFR BLD AUTO: 7.64 10*3/MM3 (ref 1.7–7)
NEUTROPHILS NFR BLD AUTO: 75.3 % (ref 42.7–76)
NITRITE UR QL STRIP: NEGATIVE
NRBC BLD AUTO-RTO: 0 /100 WBC (ref 0–0.2)
PH UR STRIP.AUTO: 5.5 [PH] (ref 5–8)
PLATELET # BLD AUTO: 274 10*3/MM3 (ref 140–450)
PMV BLD AUTO: 9.1 FL (ref 6–12)
PROT UR QL STRIP: NEGATIVE
RBC # BLD AUTO: 4.06 10*6/MM3 (ref 3.77–5.28)
RBC # UR STRIP: ABNORMAL /HPF
REF LAB TEST METHOD: ABNORMAL
SP GR UR STRIP: 1.02 (ref 1–1.03)
SQUAMOUS #/AREA URNS HPF: ABNORMAL /HPF
TRANS CELLS #/AREA URNS HPF: ABNORMAL /HPF
UROBILINOGEN UR QL STRIP: ABNORMAL
WBC # UR STRIP: ABNORMAL /HPF
WBC NRBC COR # BLD AUTO: 10.15 10*3/MM3 (ref 3.4–10.8)

## 2024-11-09 PROCEDURE — 99284 EMERGENCY DEPT VISIT MOD MDM: CPT

## 2024-11-09 PROCEDURE — 76817 TRANSVAGINAL US OBSTETRIC: CPT

## 2024-11-09 PROCEDURE — 36415 COLL VENOUS BLD VENIPUNCTURE: CPT

## 2024-11-09 PROCEDURE — 84702 CHORIONIC GONADOTROPIN TEST: CPT | Performed by: PHYSICIAN ASSISTANT

## 2024-11-09 PROCEDURE — 81001 URINALYSIS AUTO W/SCOPE: CPT | Performed by: PHYSICIAN ASSISTANT

## 2024-11-09 PROCEDURE — 85025 COMPLETE CBC W/AUTO DIFF WBC: CPT | Performed by: PHYSICIAN ASSISTANT

## 2024-11-09 PROCEDURE — 87147 CULTURE TYPE IMMUNOLOGIC: CPT | Performed by: PHYSICIAN ASSISTANT

## 2024-11-09 PROCEDURE — 87186 SC STD MICRODIL/AGAR DIL: CPT | Performed by: PHYSICIAN ASSISTANT

## 2024-11-09 PROCEDURE — 87086 URINE CULTURE/COLONY COUNT: CPT | Performed by: PHYSICIAN ASSISTANT

## 2024-11-09 RX ORDER — CEFUROXIME AXETIL 500 MG/1
500 TABLET ORAL 2 TIMES DAILY
Qty: 14 TABLET | Refills: 0 | Status: SHIPPED | OUTPATIENT
Start: 2024-11-09

## 2024-11-09 NOTE — Clinical Note
[facility] EMERGENCY DEPARTMENT  [address]  [facility] [address]  Phone: [phone]    Soledad Quigley was seen and treated in our emergency department on 11/9/2024.  She may return to work on 11/10/2024.         Thank you for choosing [facility].    Tiago Contreras PA

## 2024-11-09 NOTE — ED PROVIDER NOTES
Subjective   History of Present Illness    Review of Systems    Past Medical History:   Diagnosis Date    Abnormal Pap smear of cervix     Anemia     Anxiety     Asthma     Depression     Diabetes type 2, controlled     Diabetes type 2, controlled 2023    GERD (gastroesophageal reflux disease)     Gestational diabetes     Gestational hypertension, third trimester     Hypothyroid     Obesity (BMI 35.0-39.9 without comorbidity)     PCOS (polycystic ovarian syndrome)     Polycystic ovary syndrome     Type 1 diabetes mellitus     Vitamin D deficiency     Vitamin D deficiency        Allergies   Allergen Reactions    Sulfa Antibiotics Diarrhea, Nausea And Vomiting and GI Intolerance    Bactrim [Sulfamethoxazole-Trimethoprim] Diarrhea, Nausea And Vomiting and GI Intolerance       Past Surgical History:   Procedure Laterality Date     SECTION  2020       Family History   Problem Relation Age of Onset    Arthritis Mother     Mental illness Mother     Breast cancer Mother     Depression Mother     Hypertension Mother     Obesity Mother     Sleep apnea Mother     Heart disease Mother     Fibromyalgia Mother     Obesity Father     Hypertension Father     Arthritis Father     Diabetes Father         type 2    Hyperlipidemia Father     No Known Problems Daughter     Arthritis Maternal Aunt     Sleep apnea Maternal Aunt     Obesity Maternal Aunt     Depression Maternal Aunt     Obesity Maternal Uncle     Hypertension Maternal Uncle     Depression Maternal Uncle     Heart disease Maternal Uncle     Depression Paternal Aunt     Arthritis Paternal Aunt     Osteoporosis Paternal Aunt     Arthritis Maternal Grandmother     Sleep apnea Maternal Grandmother     Obesity Maternal Grandmother     Heart disease Maternal Grandmother     Hypertension Maternal Grandmother     Depression Maternal Grandmother     Hypertension Maternal Grandfather     Depression Maternal Grandfather     Arthritis Maternal Grandfather      Diabetes Paternal Grandmother     Diabetes type I Paternal Grandmother     Diabetes Paternal Grandfather     Arthritis Paternal Grandfather     Sleep apnea Paternal Grandfather     Obesity Paternal Grandfather     Hypertension Paternal Grandfather     Cancer Paternal Grandfather         colon    Lung cancer Paternal Grandfather        Social History     Socioeconomic History    Marital status:    Tobacco Use    Smoking status: Never    Smokeless tobacco: Never   Vaping Use    Vaping status: Never Used   Substance and Sexual Activity    Alcohol use: Never    Drug use: Never    Sexual activity: Yes     Partners: Male     Birth control/protection: Condom           Objective   Physical Exam    Procedures           ED Course  ED Course as of 11/09/24 1331   Sat Nov 09, 2024   1053 ABO/RH  Order: [id number]  Status: Final result       Next appt: 11/18/2024 at 09:30 AM in Radiology ([facility] US 2)    Specimen Information: Blood  Specimen Comment: Specimen type and source: Blood, Venous blood (substance)     Component 4 yr ago  ABORh A POS  Resulting Agency [facility]          Specimen Collected: 03/09/20 07:03 EDT Last Resulted: 03/09/20 07:46 EDT  Received From: [facility]  Result Received: 07/07/22 19:38       Lab Flowsheet        Order Details        View Encounter        Lab and Collection Details        Received Information        Result History    View All Conversations on this Encounter      [CAMRYN]      ED Course User Index  [CAMRYN] Tiago Contreras PA                    No data recorded                             MDM    Final diagnoses:   7 weeks gestation of pregnancy   First trimester bleeding   Dysuria       ED Disposition  ED Disposition       ED Disposition   Discharge    Condition   Stable    Comment   --               Ryan Wilkerson MD  [address] [address]  [phone]               Medication List        New Prescriptions      cefuroxime  500 MG tablet  Commonly known as: CEFTIN  Take 1 tablet by mouth 2 (Two) Times a Day.               Where to Get Your Medications        These medications were sent to [facility]/pharmacy #[id number] - [place], [place] - [address] [facility] - [phone]  - [phone]   [address] [address]      Phone: [phone]   cefuroxime 500 MG tablet          symptoms we discussed putting her on oral antibiotics versus following her urine culture she preferred to watch the urine culture.  Follow-up with her OB/GYN return here for bleeding greater than 2 pads an hour for 2 more consecutive hours any other problems or complications.    Problems Addressed:  7 weeks gestation of pregnancy: complicated acute illness or injury  Dysuria: complicated acute illness or injury  First trimester bleeding: complicated acute illness or injury    Amount and/or Complexity of Data Reviewed  Labs: ordered.  Radiology: ordered.        Final diagnoses:   7 weeks gestation of pregnancy   First trimester bleeding   Dysuria       ED Disposition  ED Disposition       ED Disposition   Discharge    Condition   Stable    Comment   --               Ryan Wilkerson MD  [address] [address]  [phone]               Medication List      No changes were made to your prescriptions during this visit.            Tiago Contreras PA  12/09/24 [id number]

## 2024-11-11 ENCOUNTER — TELEPHONE (OUTPATIENT)
Dept: EMERGENCY DEPT | Facility: HOSPITAL | Age: 24
End: 2024-11-11
Payer: MEDICAID

## 2024-11-11 LAB — BACTERIA SPEC AEROBE CULT: ABNORMAL

## 2024-11-15 PROBLEM — Z01.419 WELL WOMAN EXAM: Status: ACTIVE | Noted: 2024-11-15

## 2024-11-15 PROBLEM — D64.9 ANEMIA: Status: RESOLVED | Noted: 2023-08-11 | Resolved: 2024-11-15

## 2024-11-15 PROBLEM — E66.813 CLASS 3 SEVERE OBESITY DUE TO EXCESS CALORIES WITH BODY MASS INDEX (BMI) OF 40.0 TO 44.9 IN ADULT: Status: RESOLVED | Noted: 2024-09-17 | Resolved: 2024-11-15

## 2024-11-15 PROBLEM — E66.01 CLASS 2 SEVERE OBESITY WITH SERIOUS COMORBIDITY IN ADULT: Status: RESOLVED | Noted: 2023-07-07 | Resolved: 2024-11-15

## 2024-11-15 PROBLEM — E13.10 DIABETIC KETOACIDOSIS WITHOUT COMA ASSOCIATED WITH OTHER SPECIFIED DIABETES MELLITUS: Status: RESOLVED | Noted: 2022-07-07 | Resolved: 2024-11-15

## 2024-11-15 PROBLEM — O34.219 PREVIOUS CESAREAN DELIVERY AFFECTING PREGNANCY: Status: ACTIVE | Noted: 2024-11-15

## 2024-11-15 PROBLEM — E10.65 TYPE 1 DIABETES MELLITUS WITH HYPERGLYCEMIA: Status: RESOLVED | Noted: 2022-10-20 | Resolved: 2024-11-15

## 2024-11-15 PROBLEM — E66.01 CLASS 3 SEVERE OBESITY DUE TO EXCESS CALORIES WITH BODY MASS INDEX (BMI) OF 40.0 TO 44.9 IN ADULT: Status: RESOLVED | Noted: 2024-09-17 | Resolved: 2024-11-15

## 2024-11-15 PROBLEM — E66.812 CLASS 2 SEVERE OBESITY WITH SERIOUS COMORBIDITY IN ADULT: Status: RESOLVED | Noted: 2023-07-07 | Resolved: 2024-11-15

## 2024-11-15 PROBLEM — E28.2 PCOS (POLYCYSTIC OVARIAN SYNDROME): Status: RESOLVED | Noted: 2023-08-11 | Resolved: 2024-11-15

## 2024-11-15 PROBLEM — O09.90 HIGH-RISK PREGNANCY: Status: ACTIVE | Noted: 2024-11-15

## 2024-11-15 PROBLEM — O99.210 OBESITY IN PREGNANCY, ANTEPARTUM: Status: ACTIVE | Noted: 2024-11-15

## 2024-11-15 PROBLEM — O24.919 DIABETES IN PREGNANCY: Status: ACTIVE | Noted: 2024-11-15

## 2024-11-18 ENCOUNTER — LAB (OUTPATIENT)
Dept: LAB | Facility: HOSPITAL | Age: 24
End: 2024-11-18
Payer: MEDICAID

## 2024-11-18 ENCOUNTER — INITIAL PRENATAL (OUTPATIENT)
Dept: OBSTETRICS AND GYNECOLOGY | Facility: CLINIC | Age: 24
End: 2024-11-18
Payer: MEDICAID

## 2024-11-18 VITALS — WEIGHT: 233 LBS | BODY MASS INDEX: 39.99 KG/M2 | DIASTOLIC BLOOD PRESSURE: 72 MMHG | SYSTOLIC BLOOD PRESSURE: 126 MMHG

## 2024-11-18 DIAGNOSIS — O09.91 HIGH-RISK PREGNANCY IN FIRST TRIMESTER: ICD-10-CM

## 2024-11-18 DIAGNOSIS — Z71.85 VACCINE COUNSELING: ICD-10-CM

## 2024-11-18 DIAGNOSIS — O34.219 PREVIOUS CESAREAN DELIVERY AFFECTING PREGNANCY: ICD-10-CM

## 2024-11-18 DIAGNOSIS — O24.011 PRE-EXISTING TYPE 1 DIABETES MELLITUS DURING PREGNANCY IN FIRST TRIMESTER: ICD-10-CM

## 2024-11-18 DIAGNOSIS — O99.210 OBESITY IN PREGNANCY, ANTEPARTUM: ICD-10-CM

## 2024-11-18 DIAGNOSIS — O09.91 HIGH-RISK PREGNANCY IN FIRST TRIMESTER: Primary | ICD-10-CM

## 2024-11-18 PROBLEM — R94.6 ABNORMAL THYROID FUNCTION TEST: Status: RESOLVED | Noted: 2022-10-20 | Resolved: 2024-11-18

## 2024-11-18 PROBLEM — K21.9 GERD (GASTROESOPHAGEAL REFLUX DISEASE): Status: RESOLVED | Noted: 2023-08-11 | Resolved: 2024-11-18

## 2024-11-18 PROBLEM — J45.909 ASTHMA: Status: RESOLVED | Noted: 2023-08-11 | Resolved: 2024-11-18

## 2024-11-18 PROBLEM — E10.9 TYPE 1 DIABETES: Status: ACTIVE | Noted: 2023-08-25

## 2024-11-18 PROBLEM — F32.A DEPRESSION: Status: RESOLVED | Noted: 2023-08-11 | Resolved: 2024-11-18

## 2024-11-18 PROBLEM — R73.09 ELEVATED HEMOGLOBIN A1C: Status: ACTIVE | Noted: 2024-11-18

## 2024-11-18 PROBLEM — F41.9 ANXIETY: Status: RESOLVED | Noted: 2023-08-11 | Resolved: 2024-11-18

## 2024-11-18 LAB
ABO GROUP BLD: NORMAL
BASOPHILS # BLD AUTO: 0.03 10*3/MM3 (ref 0–0.2)
BASOPHILS NFR BLD AUTO: 0.3 % (ref 0–1.5)
BLD GP AB SCN SERPL QL: NEGATIVE
DEPRECATED RDW RBC AUTO: 41.3 FL (ref 37–54)
EOSINOPHIL # BLD AUTO: 0.09 10*3/MM3 (ref 0–0.4)
EOSINOPHIL NFR BLD AUTO: 1 % (ref 0.3–6.2)
ERYTHROCYTE [DISTWIDTH] IN BLOOD BY AUTOMATED COUNT: 12.7 % (ref 12.3–15.4)
HBA1C MFR BLD: 7 % (ref 4.8–5.6)
HBV SURFACE AG SERPL QL IA: NORMAL
HCT VFR BLD AUTO: 37.7 % (ref 34–46.6)
HCV AB SER QL: NORMAL
HGB BLD-MCNC: 12.9 G/DL (ref 12–15.9)
HIV 1+2 AB+HIV1 P24 AG SERPL QL IA: NORMAL
IMM GRANULOCYTES # BLD AUTO: 0.04 10*3/MM3 (ref 0–0.05)
IMM GRANULOCYTES NFR BLD AUTO: 0.5 % (ref 0–0.5)
LYMPHOCYTES # BLD AUTO: 1.61 10*3/MM3 (ref 0.7–3.1)
LYMPHOCYTES NFR BLD AUTO: 18.7 % (ref 19.6–45.3)
MCH RBC QN AUTO: 30.9 PG (ref 26.6–33)
MCHC RBC AUTO-ENTMCNC: 34.2 G/DL (ref 31.5–35.7)
MCV RBC AUTO: 90.4 FL (ref 79–97)
MONOCYTES # BLD AUTO: 0.43 10*3/MM3 (ref 0.1–0.9)
MONOCYTES NFR BLD AUTO: 5 % (ref 5–12)
NEUTROPHILS NFR BLD AUTO: 6.39 10*3/MM3 (ref 1.7–7)
NEUTROPHILS NFR BLD AUTO: 74.5 % (ref 42.7–76)
NRBC BLD AUTO-RTO: 0 /100 WBC (ref 0–0.2)
PLATELET # BLD AUTO: 359 10*3/MM3 (ref 140–450)
PMV BLD AUTO: 9.4 FL (ref 6–12)
RBC # BLD AUTO: 4.17 10*6/MM3 (ref 3.77–5.28)
RH BLD: POSITIVE
RPR SER QL: NORMAL
T4 FREE SERPL-MCNC: 1.25 NG/DL (ref 0.92–1.68)
TSH SERPL DL<=0.05 MIU/L-ACNC: 2.49 UIU/ML (ref 0.27–4.2)
WBC NRBC COR # BLD AUTO: 8.59 10*3/MM3 (ref 3.4–10.8)

## 2024-11-18 PROCEDURE — 83036 HEMOGLOBIN GLYCOSYLATED A1C: CPT

## 2024-11-18 PROCEDURE — 86803 HEPATITIS C AB TEST: CPT

## 2024-11-18 PROCEDURE — 36415 COLL VENOUS BLD VENIPUNCTURE: CPT

## 2024-11-18 PROCEDURE — 84443 ASSAY THYROID STIM HORMONE: CPT

## 2024-11-18 PROCEDURE — 84439 ASSAY OF FREE THYROXINE: CPT

## 2024-11-18 PROCEDURE — 80081 OBSTETRIC PANEL INC HIV TSTG: CPT

## 2024-11-18 PROCEDURE — 99214 OFFICE O/P EST MOD 30 MIN: CPT | Performed by: OBSTETRICS & GYNECOLOGY

## 2024-11-18 RX ORDER — PRENATAL VIT/IRON FUM/FOLIC AC 27MG-0.8MG
TABLET ORAL DAILY
COMMUNITY

## 2024-11-18 NOTE — PROGRESS NOTES
Subjective   Chief Complaint   Patient presents with    Initial Prenatal Visit       Soledad Quigley is a 24 y.o. year old .  Patient's last menstrual period was 2024.  She presents to be seen to initiate prenatal care.  She has a late onset type I diabetic.  She sees endocrinology for this.  She has had elevated A1c's in the first trimester.  She is aware of the thresholds for blood sugar monitoring being fasting less than 90 and 2-hour postprandials less than 120.    Social History    Tobacco Use      Smoking status: Never      Smokeless tobacco: Never      The following portions of the patient's history were reviewed and updated as appropriate:vital signs, allergies, current medications, past medical history, past social history, past surgical history, and problem list.    Objective   Lab Review   See HPI    Imaging   Pelvic ultrasound report    Assessment & Plan   ASSESSMENT  24 y.o. year old  at 9w0d  Supervision of high risk pregnancy  Previous C/S with route of delivery to be determined  Type 1 diabetes with elevated A1c in the first trimester    PLAN  The problem list for pregnancy was initiated today  Start baby aspirin at the completion of the first trimester to lower the risk of hypertensive disorders  Tests ordered today:  Orders Placed This Encounter   Procedures    Urine Culture - Urine, Urine, Clean Catch     Standing Status:   Future     Order Specific Question:   Release to patient     Answer:   Routine Release [[id number]]    Chlamydia trachomatis, Neisseria gonorrhoeae, PCR w/ confirmation - Urine, Urine, Clean Catch     Standing Status:   Future     Order Specific Question:   Release to patient     Answer:   Routine Release [[id number]]    Obstetric Panel     Standing Status:   Future     Order Specific Question:   Release to patient     Answer:   Routine Release [[id number]]    HIV-1 / O / 2 Ag / Antibody     Standing Status:   Future     Order Specific Question:   Release to  patient     Answer:   Routine Release [[id number]]    Urine Drug Screen - Urine, Clean Catch     Please confirm all positive UDS     Standing Status:   Future     Order Specific Question:   Release to patient     Answer:   Routine Release [[id number]]    TSH     Standing Status:   Future     Order Specific Question:   Release to patient     Answer:   Routine Release [[id number]]    T4, Free     Standing Status:   Future     Order Specific Question:   Release to patient     Answer:   Routine Release [[id number]]    Protein, Urine, 24 Hour - Urine, Clean Catch     Standing Status:   Future     Order Specific Question:   Release to patient     Answer:   Routine Release [[id number]]    Hemoglobin A1c     Standing Status:   Future     Standing Expiration Date:   11/18/2025     Order Specific Question:   Release to patient     Answer:   Routine Release [[id number]]     Testing for GC / Chlamydia / trichomonas was done today  Genetic testing reviewed: NIPT (Janell)  Explained increased risk of structural anomalies given elevated A1c and also the need for fetal echo  Her vaccine record was reviewed and updated.  I discussed with Soledad that she may be behind on needed vaccinations for Influenza and COVID booster / COVID bivalent booster.  She may be able to obtain these vaccinations at her local pharmacy OR speak about obtaining them with her primary care.  If she does obtain her vaccines, I have asked Soledad to let us know the date each vaccine was obtained so that her medical record could be updated in our system.  Information reviewed: exercise in pregnancy, nutrition in pregnancy, weight gain in pregnancy, work and travel restrictions during pregnancy, list of OTC medications acceptable in pregnancy, and call coverage groups    Total time spent today with Soledad  was 45 minutes (level 4).  Off this time, 100% was spent face-to-face time coordinating care, answering her questions and counseling regarding exercise in  pregnancy, nutrition in pregnancy, weight gain in pregnancy, work and travel restrictions during pregnancy, list of OTC medications acceptable in pregnancy and call coverage groups and pathophysiology of her presenting problem along with plans for any diagnositc work-up and treatment.  The time reported only includes face-to-face time and excludes any procedural based activities that occurred on the same date of service.    Follow up: 3 week(s)       This note was electronically signed.    Ryan Wilkerson MD  November 18, 2024

## 2024-11-19 ENCOUNTER — DOCUMENTATION (OUTPATIENT)
Dept: ENDOCRINOLOGY | Facility: CLINIC | Age: 24
End: 2024-11-19

## 2024-11-19 ENCOUNTER — OFFICE VISIT (OUTPATIENT)
Dept: ENDOCRINOLOGY | Facility: CLINIC | Age: 24
End: 2024-11-19
Payer: MEDICAID

## 2024-11-19 VITALS
HEART RATE: 78 BPM | WEIGHT: 232 LBS | SYSTOLIC BLOOD PRESSURE: 118 MMHG | OXYGEN SATURATION: 96 % | DIASTOLIC BLOOD PRESSURE: 78 MMHG | HEIGHT: 64 IN | BODY MASS INDEX: 39.61 KG/M2

## 2024-11-19 DIAGNOSIS — E10.9 TYPE 1 DIABETES MELLITUS WITHOUT COMPLICATION: Primary | ICD-10-CM

## 2024-11-19 DIAGNOSIS — O24.011 PRE-EXISTING TYPE 1 DIABETES MELLITUS DURING PREGNANCY IN FIRST TRIMESTER: ICD-10-CM

## 2024-11-19 LAB — RUBV IGG SERPL IA-ACNC: 1.86 INDEX

## 2024-11-19 PROCEDURE — 1159F MED LIST DOCD IN RCRD: CPT | Performed by: INTERNAL MEDICINE

## 2024-11-19 PROCEDURE — 3051F HG A1C>EQUAL 7.0%<8.0%: CPT | Performed by: INTERNAL MEDICINE

## 2024-11-19 PROCEDURE — 95251 CONT GLUC MNTR ANALYSIS I&R: CPT | Performed by: INTERNAL MEDICINE

## 2024-11-19 PROCEDURE — 99213 OFFICE O/P EST LOW 20 MIN: CPT | Performed by: INTERNAL MEDICINE

## 2024-11-19 PROCEDURE — 1160F RVW MEDS BY RX/DR IN RCRD: CPT | Performed by: INTERNAL MEDICINE

## 2024-11-19 NOTE — ASSESSMENT & PLAN NOTE
Diabetes is improving with treatment.   Adjust insulin doses.  Diabetes will be reassessed in 1 month.    FreeStyle Carl 3 CGM was downloaded today.  Data was reviewed from 11/6/24 to 11/19j/24.  This showed relatively good overnight glucose.  Some postprandial spikes.  Will adjust CHO ratio (from 1:5 to 1:4.5) to address this.  Time in range was 91%.    She will be able to get DexCom sensors soon which will allow the insulin pump to make automatic adjustments.

## 2024-11-19 NOTE — PROGRESS NOTES
Assisted pairing pt to clinic for sharing. Using Tmobi with Carl - unable to fill Dexcom sensors for 1 month. Provided sample G7 sensors so she can switch to CIQ. Pt is 9 weeks pregnant.

## 2024-11-19 NOTE — PROGRESS NOTES
"     Office Note      Date: 2024  Patient Name: Soledad Quigley  MRN: [id number]  : 2000    Chief Complaint   Patient presents with    Diabetes     Type 1         History of Present Illness:   Soledad Quigley is a 24 y.o. female who presents for Diabetes type 1. Diagnosed in: . Treated in past with insulin. She reports h/o gestational DM treated with diet that cleared after delivery.  Current treatments: Tandem Mobi insulin pump. Number of insulin shots per day: none. Checks blood sugar 288 times a day - on FreeStyle Carl 3. Has low blood sugar: occasional. Aspirin use: No. Statin use: No. ACE-I/ARB use: No.  Change in health since last visit: none.  Last eye exam: 2024.     She is 9 weeks into the pregnancy.  It seems to be progressing well.    Subjective      Diabetic Complications:  Eyes: No  Kidneys: No  Feet: No  Heart: No    Diet and Exercise:  Meals per day: 3  Minutes of exercise per week: 0 mins.    Review of Systems:   Review of Systems   Constitutional: Negative.    Cardiovascular: Negative.    Gastrointestinal:  Positive for constipation.   Endocrine: Negative.        The following portions of the patient's history were reviewed and updated as appropriate: allergies, current medications, past family history, past medical history, past social history, past surgical history, and problem list.    Objective     Visit Vitals  /78 (BP Location: Right arm, Patient Position: Sitting)   Pulse 78   Ht 162.6 cm (64\")   Wt 105 kg (232 lb)   LMP 2024   SpO2 96%   BMI 39.82 kg/m²       Physical Exam:  Physical Exam  Constitutional:       Appearance: Normal appearance.   Neurological:      Mental Status: She is alert.         Labs:    HbA1c  Lab Results   Component Value Date    HGBA1C 7.00 (H) 2024       CMP  Lab Results   Component Value Date    GLUCOSE 192 (H) 2024    BUN 13 2024    CREATININE 0.49 (L) 2024    EGFRIFNONA 149 2020    EGFRIFAFRI 172 2020 " "   BCR 26.5 (H) 09/18/2024    K 4.6 09/18/2024    CO2 23.1 09/18/2024    CALCIUM 9.6 09/18/2024    PROTENTOTREF 7.4 09/18/2024    LABIL2 1.5 09/18/2024    AST 13 09/18/2024    ALT 10 09/18/2024        Lipid Panel  Lab Results   Component Value Date    HDL Cholesterol 48 09/18/2024    HDL Cholesterol 45 03/01/2023    HDL Cholesterol 42 06/15/2018    LDL Cholesterol  55 06/15/2018    LDL Chol Calc (NIH) 93 09/18/2024    LDL/HDL Ratio 1.52 03/01/2023    Triglycerides 119 09/18/2024    Triglycerides 93 03/01/2023    Triglycerides 104 06/15/2018        TSH  Lab Results   Component Value Date    TSH 2.490 11/18/2024    FREET4 1.25 11/18/2024        Hemoglobin A1C  No components found for: \"HGBA1C\"     Microalbumin/Creatinine  Lab Results   Component Value Date    MALBCRERATIO  03/01/2023      Comment:      Unable to calculate    CREATINIURIN 110.3 09/18/2024    MICROALBUR <1.2 03/01/2023           Assessment / Plan      Assessment & Plan:  Diagnoses and all orders for this visit:    1. Type 1 diabetes mellitus without complication (Primary)  Assessment & Plan:  Diabetes is improving with treatment.   Adjust insulin doses.  Diabetes will be reassessed in 1 month.    FreeStyle Carl 3 CGM was downloaded today.  Data was reviewed from 11/6/24 to 11/19j/24.  This showed relatively good overnight glucose.  Some postprandial spikes.  Will adjust CHO ratio (from 1:5 to 1:4.5) to address this.  Time in range was 91%.    She will be able to get DexCom sensors soon which will allow the insulin pump to make automatic adjustments.      2. Pre-existing type 1 diabetes mellitus during pregnancy in first trimester  Assessment & Plan:  She has seen her Ob.  Labs done yesterday showed significant improvement in A1c - now down to 7.0%.        Current Outpatient Medications   Medication Instructions    Blood Glucose Monitoring Suppl (FreeStyle Lite) w/Device kit Use as directed to check blood sugar    cefuroxime (CEFTIN) 500 mg, Oral, 2 " Times Daily    Continuous Glucose Sensor (Dexcom G7 Sensor) misc 1 each, Not Applicable, Every 10 Days    Continuous Glucose Sensor (FreeStyle Carl 3 Sensor) misc 1 each, Not Applicable, Every 14 Days    glucose blood test strip Use to test blood glucose up to four times daily as needed.    Insulin Lispro, 1 Unit Dial, (HUMALOG) 100 UNIT/ML solution pen-injector Use as directed 3x daily; max daily dose 100u    [employer] Pen Needles 32G X 4 MM misc USE  FOUR TIMES PER DAY AS NEEDED FOR INSULIN INJECTIONS.    Lancets (freestyle) lancets Use 1 each 4 (Four) Times a Day.    Levemir FlexPen 50 Units, Subcutaneous, Daily, Titate to fasting glucose <90.  Max daily dose 100u.    Prenatal Vit-Fe Fumarate-FA (prenatal vitamin 27-0.8) 27-0.8 MG tablet tablet Daily    vitamin D (ERGOCALCIFEROL) 50,000 Units, Oral, Weekly      Return in about 1 month (around 12/19/2024) for Recheck with A1c - ok to schedule with PA if needed.    Electronically signed by: Francesco Christianson MD  11/19/2024

## 2024-11-19 NOTE — ASSESSMENT & PLAN NOTE
She has seen her Ob.  Labs done yesterday showed significant improvement in A1c - now down to 7.0%.

## 2024-12-02 ENCOUNTER — PATIENT ROUNDING (BHMG ONLY) (OUTPATIENT)
Dept: OBSTETRICS AND GYNECOLOGY | Facility: CLINIC | Age: 24
End: 2024-12-02
Payer: MEDICAID

## 2024-12-02 NOTE — PROGRESS NOTES
My name is Rosalia, clinical manager    I am with [facility]  [facility]  [address]  [place] [address] [phone]    I want to officially welcome you to our practice and ask about your recent visit.    Tell me about your visit with us.  What things went well?    We're always looking for ways to make our patients' experiences even better. Do you have recommendations on way we may improve?    Overall were you satisfied with your first visit to our practice?    I appreciate you taking the time to answer a few questions today. Is there anything else I can do for you?    Thank you, and have a great day.

## 2024-12-09 ENCOUNTER — LAB (OUTPATIENT)
Dept: LAB | Facility: HOSPITAL | Age: 24
End: 2024-12-09
Payer: MEDICAID

## 2024-12-09 ENCOUNTER — ROUTINE PRENATAL (OUTPATIENT)
Dept: OBSTETRICS AND GYNECOLOGY | Facility: CLINIC | Age: 24
End: 2024-12-09
Payer: MEDICAID

## 2024-12-09 VITALS — WEIGHT: 231.6 LBS | BODY MASS INDEX: 39.75 KG/M2 | SYSTOLIC BLOOD PRESSURE: 118 MMHG | DIASTOLIC BLOOD PRESSURE: 72 MMHG

## 2024-12-09 DIAGNOSIS — O09.91 HIGH-RISK PREGNANCY IN FIRST TRIMESTER: ICD-10-CM

## 2024-12-09 DIAGNOSIS — O23.41 URINARY TRACT INFECTION IN MOTHER DURING FIRST TRIMESTER OF PREGNANCY: ICD-10-CM

## 2024-12-09 DIAGNOSIS — O09.92 HIGH-RISK PREGNANCY IN SECOND TRIMESTER: ICD-10-CM

## 2024-12-09 DIAGNOSIS — E10.9 TYPE 1 DIABETES MELLITUS WITHOUT COMPLICATION: ICD-10-CM

## 2024-12-09 DIAGNOSIS — Z86.2 HISTORY OF ANEMIA: Primary | ICD-10-CM

## 2024-12-09 DIAGNOSIS — O24.011 PRE-EXISTING TYPE 1 DIABETES MELLITUS DURING PREGNANCY IN FIRST TRIMESTER: ICD-10-CM

## 2024-12-09 DIAGNOSIS — Z86.2 HISTORY OF ANEMIA: ICD-10-CM

## 2024-12-09 LAB
ALP SERPL-CCNC: 71 U/L (ref 39–117)
ALT SERPL W P-5'-P-CCNC: 10 U/L (ref 1–33)
AMPHET+METHAMPHET UR QL: NEGATIVE
AMPHETAMINES UR QL: NEGATIVE
AST SERPL-CCNC: 10 U/L (ref 1–32)
BARBITURATES UR QL SCN: NEGATIVE
BENZODIAZ UR QL SCN: NEGATIVE
BILIRUB SERPL-MCNC: 0.4 MG/DL (ref 0–1.2)
BUPRENORPHINE SERPL-MCNC: NEGATIVE NG/ML
CANNABINOIDS SERPL QL: NEGATIVE
COCAINE UR QL: NEGATIVE
COLLECT DURATION TIME UR: 24 HRS
CREAT SERPL-MCNC: 0.41 MG/DL (ref 0.57–1)
DEPRECATED RDW RBC AUTO: 39.9 FL (ref 37–54)
ERYTHROCYTE [DISTWIDTH] IN BLOOD BY AUTOMATED COUNT: 12.5 % (ref 12.3–15.4)
FENTANYL UR-MCNC: NEGATIVE NG/ML
HCT VFR BLD AUTO: 35.7 % (ref 34–46.6)
HGB BLD-MCNC: 12.4 G/DL (ref 12–15.9)
LDH SERPL-CCNC: 150 U/L (ref 135–214)
MCH RBC QN AUTO: 30.7 PG (ref 26.6–33)
MCHC RBC AUTO-ENTMCNC: 34.7 G/DL (ref 31.5–35.7)
MCV RBC AUTO: 88.4 FL (ref 79–97)
METHADONE UR QL SCN: NEGATIVE
OPIATES UR QL: NEGATIVE
OXYCODONE UR QL SCN: NEGATIVE
PCP UR QL SCN: NEGATIVE
PLATELET # BLD AUTO: 317 10*3/MM3 (ref 140–450)
PMV BLD AUTO: 9.6 FL (ref 6–12)
PROT 24H UR-MRATE: 124 MG/24HOURS (ref 0–150)
RBC # BLD AUTO: 4.04 10*6/MM3 (ref 3.77–5.28)
SPECIMEN VOL 24H UR: 3100 ML
TRICYCLICS UR QL SCN: NEGATIVE
URATE SERPL-MCNC: 2.8 MG/DL (ref 2.4–5.7)
WBC NRBC COR # BLD AUTO: 8.82 10*3/MM3 (ref 3.4–10.8)

## 2024-12-09 PROCEDURE — 90656 IIV3 VACC NO PRSV 0.5 ML IM: CPT | Performed by: STUDENT IN AN ORGANIZED HEALTH CARE EDUCATION/TRAINING PROGRAM

## 2024-12-09 PROCEDURE — 82565 ASSAY OF CREATININE: CPT

## 2024-12-09 PROCEDURE — 84550 ASSAY OF BLOOD/URIC ACID: CPT

## 2024-12-09 PROCEDURE — 84156 ASSAY OF PROTEIN URINE: CPT

## 2024-12-09 PROCEDURE — 80307 DRUG TEST PRSMV CHEM ANLYZR: CPT

## 2024-12-09 PROCEDURE — 81050 URINALYSIS VOLUME MEASURE: CPT

## 2024-12-09 PROCEDURE — 87491 CHLMYD TRACH DNA AMP PROBE: CPT

## 2024-12-09 PROCEDURE — 99214 OFFICE O/P EST MOD 30 MIN: CPT | Performed by: STUDENT IN AN ORGANIZED HEALTH CARE EDUCATION/TRAINING PROGRAM

## 2024-12-09 PROCEDURE — 84075 ASSAY ALKALINE PHOSPHATASE: CPT

## 2024-12-09 PROCEDURE — 87591 N.GONORRHOEAE DNA AMP PROB: CPT

## 2024-12-09 PROCEDURE — 84460 ALANINE AMINO (ALT) (SGPT): CPT

## 2024-12-09 PROCEDURE — 85027 COMPLETE CBC AUTOMATED: CPT

## 2024-12-09 PROCEDURE — 82247 BILIRUBIN TOTAL: CPT

## 2024-12-09 PROCEDURE — 84450 TRANSFERASE (AST) (SGOT): CPT

## 2024-12-09 PROCEDURE — 87086 URINE CULTURE/COLONY COUNT: CPT

## 2024-12-09 PROCEDURE — 36415 COLL VENOUS BLD VENIPUNCTURE: CPT

## 2024-12-09 PROCEDURE — 90471 IMMUNIZATION ADMIN: CPT | Performed by: STUDENT IN AN ORGANIZED HEALTH CARE EDUCATION/TRAINING PROGRAM

## 2024-12-09 PROCEDURE — 83615 LACTATE (LD) (LDH) ENZYME: CPT

## 2024-12-09 RX ORDER — ASPIRIN 81 MG/1
162 TABLET ORAL DAILY
Qty: 30 TABLET | Refills: 11 | Status: SHIPPED | OUTPATIENT
Start: 2024-12-09

## 2024-12-09 NOTE — PROGRESS NOTES
Chief Complaint   Patient presents with    Routine Prenatal Visit     Was iron deficient with first pregnancy, is feeling the same way now that she was then       HPI: Soledad is a  currently at 12w0d who today reports the following:  Nausea - No; Vaginal bleeding -  No; Heartburn - No. She is worried that she is anemic -- has started to feel lightheaded and dizzy, which is the same way she felt in last pregnancy when she became anemic.     ROS:  GI: Constipation - No; Diarrhea - No    Neuro: Headache - No; Visual change - No    Respiratory: Cough - No; SOB - No; fever - No     EXAM:  Vitals: /72   Wt 105 kg (231 lb 9.6 oz)   LMP 2024   BMI 39.75 kg/m²     Abdomen: See prenatal flowsheet   Urine glucose/protein: See prenatal flowsheet   Pelvic: See prenatal flowsheet     Prenatal Labs  Lab Results   Component Value Date    HGB 12.9 2024    RUBELLAABIGG 1.86 2024    HEPBSAG Non-Reactive 2024    ABORH A POS 2020    ABSCRN Negative 2024    LTB9TAG7 Non-Reactive 2024    HEPCVIRUSABY Non-Reactive 2024    URINECX >100,000 CFU/mL Staphylococcus aureus (A) 2024    CHLAMNAA Negative 2023    NGONORRHON Negative 2023       MDM:  Impression: Supervision of high risk pregnancy  Previous C/S with planned repeat C/S  T1DM  History of anemia   UTI early pregnancy - given cefuroxime 24    Tests done today: US for fetal HR - 163 bpm   Topics discussed: Prenatal labs reviewed  Continue with Rx prenatal vitamins  Discussed repeat C/S versus TOLAC. Patient desires repeat C/S   Discussed flu and COVID vaccines recommended at any GA -- will give flu today.   Discussed Tdap at 28 weeks.   Discussed increased risk of preeclampsia for patients with T1DM. Baseline preE panel ordered today. Discussed baby aspirin 162 mg for preeclampsia prevention. Discussed that ACOG recommendation is currently for 81 mg daily, but recent studies have shown increased  efficacy of higher dose as is recommended by  societies. Recommend patient start 162 mg given high risk for preE.   Will order CBC to check for anemia today. Discussed taking prenatal vitamin containing iron.   Following with endocrinology for DM. Discussed importance of good glucose control.   Discussed NIPT -- Panorama ordered today. Patient declines carrier screening. Discussed that NIPT is screening test and patient may require diagnostic testing with amniocentesis if NIPT is positive.    Tests scheduled today for her next visit:   Needs urine culture for test of cure due to UTI at 7w      Follow up in 4 weeks.     Tonya Ren MD  Obstetrics and Gynecology  [facility]

## 2024-12-10 LAB — BACTERIA SPEC AEROBE CULT: NORMAL

## 2024-12-11 LAB
C TRACH RRNA SPEC QL NAA+PROBE: NEGATIVE
N GONORRHOEA RRNA SPEC QL NAA+PROBE: NEGATIVE

## 2024-12-13 ENCOUNTER — TELEPHONE (OUTPATIENT)
Dept: ENDOCRINOLOGY | Facility: CLINIC | Age: 24
End: 2024-12-13
Payer: MEDICAID

## 2024-12-13 RX ORDER — ACYCLOVIR 800 MG/1
1 TABLET ORAL
Qty: 6 EACH | Refills: 3 | Status: SHIPPED | OUTPATIENT
Start: 2024-12-13

## 2024-12-15 LAB
Lab: NORMAL
NTRA FETAL FRACTION: NORMAL
NTRA GENDER OF FETUS: NORMAL
NTRA MONOSOMY X AGE-BASED RISK TEXT: NORMAL
NTRA MONOSOMY X RESULT TEXT: NORMAL
NTRA MONOSOMY X RISK SCORE TEXT: NORMAL
NTRA TRIPLOIDY RESULT TEXT: NORMAL
NTRA TRISOMY 13 AGE-BASED RISK TEXT: NORMAL
NTRA TRISOMY 13 RESULT TEXT: NORMAL
NTRA TRISOMY 13 RISK SCORE TEXT: NORMAL
NTRA TRISOMY 18 AGE-BASED RISK TEXT: NORMAL
NTRA TRISOMY 18 RESULT TEXT: NORMAL
NTRA TRISOMY 18 RISK SCORE TEXT: NORMAL
NTRA TRISOMY 21 AGE-BASED RISK TEXT: NORMAL
NTRA TRISOMY 21 RESULT TEXT: NORMAL
NTRA TRISOMY 21 RISK SCORE TEXT: NORMAL

## 2024-12-23 ENCOUNTER — OFFICE VISIT (OUTPATIENT)
Dept: ENDOCRINOLOGY | Facility: CLINIC | Age: 24
End: 2024-12-23
Payer: MEDICAID

## 2024-12-23 VITALS
SYSTOLIC BLOOD PRESSURE: 112 MMHG | HEIGHT: 64 IN | HEART RATE: 77 BPM | BODY MASS INDEX: 40.12 KG/M2 | OXYGEN SATURATION: 99 % | DIASTOLIC BLOOD PRESSURE: 60 MMHG | WEIGHT: 235 LBS

## 2024-12-23 DIAGNOSIS — O24.012 PRE-EXISTING TYPE 1 DIABETES MELLITUS DURING PREGNANCY IN SECOND TRIMESTER: Primary | ICD-10-CM

## 2024-12-23 LAB
EXPIRATION DATE: ABNORMAL
HBA1C MFR BLD: 6 % (ref 4.5–5.7)
Lab: ABNORMAL

## 2024-12-23 NOTE — ASSESSMENT & PLAN NOTE
Diabetes is improving with treatment.   Continue current treatment regimen.  Discussed ways to avoid symptomatic hypoglycemia.    Significant improvement in A1c and glucose averages on CGM data with closed-loop system of tandem and Dexcom.  She will restart this when able to get Dexcom prescription in 2 weeks.    Currently having some overnight hypoglycemia due to being on manual mode with tandem while waiting for Dexcom to be covered by insurance.  Adjusted basal rate from 2.0-1.5 overnight, from 12 AM to 9 AM.  We will see how this does and can adjust further if needed.    Gave patient some charts to start recording fasting and postprandial readings.  She should send these to us weekly for review or should check in through babbel for download of her CGM data.    Diabetes will be reassessed in 1 month

## 2024-12-23 NOTE — PROGRESS NOTES
Office Note      Date: 2024  Patient Name: Soledad Quigley  MRN: [id number]  : 2000    Chief Complaint   Patient presents with    Diabetes     Type I       History of Present Illness:   Soledad Quigley is a 24 y.o. female who presents for Diabetes type 1.   Diagnosed:   Current RX:  Humalog via Tandem Mobi    Bg checks are done:continuously with Carl  Hypoglycemia : overnight now that she is no longer on closed loop    Patient had gestational diabetes in , managed with diet and then returned to normal after delivery. Admitted with DKA in  and found to have positive pancreatic antibodies, dx changed to Type 1.    Currently 14 weeks pregnant. Has not yet seen high risk OB. Feeling well. Appropriate weight gain. Has had a hard time with morning sickness with this pregnancy. Better now.    Adjusted carb ratio at last visit and that has helped.    Patient had been using Dexcom G7 samples and was able to use the closed-loop some system with her tandem pump.  However her insurance will not yet cover Dexcom because she has an active prescription for carl    Fasting ranges: 90 or under for the most part, highest was 104  2 hours post prandial: seeing some 140s-150s    Notes that the Carl reads a little higher      Last A1c:  Hemoglobin A1C   Date Value Ref Range Status   2024 6.0 (A) 4.5 - 5.7 % Final   2024 7.00 (H) 4.80 - 5.60 % Final     The last 2 weeks of CGM data are reviewed.  6 % are low  90 % are in range  4 % are 180-250  0 % are >250  GMI: 6.0  The glycemic pattern shows: Overnight hypoglycemia, no hypoglycemia during the daytime some postprandial hyperglycemia    CGM data from -12/10 with closed loop Tandem/Dexcom:  2 % are low  96 % are in range  2.2 % are 180-250  0 % are >250  GMI: 6.0  The glycemic pattern shows: Glucose averages are steady around 100      Changes in health since last visit: none.       Subjective          Review of Systems:   Review of Systems  "  Constitutional:  Negative for activity change, appetite change and fatigue.   Gastrointestinal:  Negative for abdominal pain.   Musculoskeletal:  Negative for myalgias.   Neurological:  Negative for numbness.   Psychiatric/Behavioral:  The patient is not nervous/anxious.        The following portions of the patient's history were reviewed and updated as appropriate: allergies, current medications, past family history, past medical history, past social history, past surgical history, and problem list.    Objective     Visit Vitals  /60 (BP Location: Left arm, Patient Position: Sitting, Cuff Size: Adult)   Pulse 77   Ht 162.6 cm (64\")   Wt 107 kg (235 lb)   LMP 09/06/2024   SpO2 99%   BMI 40.34 kg/m²           Physical Exam:  Physical Exam  Vitals and nursing note reviewed.   Constitutional:       Appearance: She is well-developed.   HENT:      Head: Normocephalic and atraumatic.   Eyes:      Conjunctiva/sclera: Conjunctivae normal.   Cardiovascular:      Rate and Rhythm: Normal rate.   Pulmonary:      Effort: Pulmonary effort is normal.   Musculoskeletal:         General: Normal range of motion.      Cervical back: Normal range of motion.   Skin:     General: Skin is warm and dry.   Psychiatric:         Behavior: Behavior normal.          Assessment / Plan      Assessment & Plan:  Diagnoses and all orders for this visit:    1. Pre-existing type 1 diabetes mellitus during pregnancy in second trimester (Primary)  Assessment & Plan:  Diabetes is improving with treatment.   Continue current treatment regimen.  Discussed ways to avoid symptomatic hypoglycemia.    Significant improvement in A1c and glucose averages on CGM data with closed-loop system of tandem and Dexcom.  She will restart this when able to get Dexcom prescription in 2 weeks.    Currently having some overnight hypoglycemia due to being on manual mode with tandem while waiting for Dexcom to be covered by insurance.  Adjusted basal rate from 2.0-1.5 " overnight, from 12 AM to 9 AM.  We will see how this does and can adjust further if needed.    Gave patient some charts to start recording fasting and postprandial readings.  She should send these to us weekly for review or should check in through [employer] for download of her CGM data.    Diabetes will be reassessed in 1 month    Orders:  -     POC Glycosylated Hemoglobin (Hb A1C)        Return in about 4 weeks (around 1/20/2025) for Follow up with Dr. Christianson or me.    Portions of this note were completed with voice recognition program.  Electronically signed by Michelle Sanchez PA-C  [facility]  12/23/2024

## 2024-12-27 ENCOUNTER — REFERRAL TRIAGE (OUTPATIENT)
Dept: LABOR AND DELIVERY | Facility: HOSPITAL | Age: 24
End: 2024-12-27
Payer: MEDICAID

## 2025-01-08 ENCOUNTER — ROUTINE PRENATAL (OUTPATIENT)
Dept: OBSTETRICS AND GYNECOLOGY | Facility: CLINIC | Age: 25
End: 2025-01-08
Payer: MEDICAID

## 2025-01-08 VITALS — SYSTOLIC BLOOD PRESSURE: 136 MMHG | BODY MASS INDEX: 39.58 KG/M2 | DIASTOLIC BLOOD PRESSURE: 84 MMHG | WEIGHT: 230.6 LBS

## 2025-01-08 DIAGNOSIS — O09.92 HIGH-RISK PREGNANCY IN SECOND TRIMESTER: Primary | ICD-10-CM

## 2025-01-08 DIAGNOSIS — O24.012 PRE-EXISTING TYPE 1 DIABETES MELLITUS DURING PREGNANCY IN SECOND TRIMESTER: ICD-10-CM

## 2025-01-08 DIAGNOSIS — O34.219 PREVIOUS CESAREAN DELIVERY AFFECTING PREGNANCY: ICD-10-CM

## 2025-01-08 DIAGNOSIS — O99.210 OBESITY IN PREGNANCY, ANTEPARTUM: ICD-10-CM

## 2025-01-08 DIAGNOSIS — O23.41 URINARY TRACT INFECTION IN MOTHER DURING FIRST TRIMESTER OF PREGNANCY: ICD-10-CM

## 2025-01-08 PROCEDURE — 87086 URINE CULTURE/COLONY COUNT: CPT | Performed by: STUDENT IN AN ORGANIZED HEALTH CARE EDUCATION/TRAINING PROGRAM

## 2025-01-08 RX ORDER — ONDANSETRON 4 MG/1
4 TABLET, FILM COATED ORAL DAILY PRN
Qty: 30 TABLET | Refills: 1 | Status: SHIPPED | OUTPATIENT
Start: 2025-01-08 | End: 2026-01-08

## 2025-01-08 NOTE — PROGRESS NOTES
Chief Complaint   Patient presents with    Routine Prenatal Visit     Had a stomach flu yesterday and could not keep anything down, can keep fluids down today but cannot eat anything       HPI: Soledad is a  currently at 16w2d who today reports the following:    She had nausea and vomiting yesterday, able to keep fluids down today. Thinks she got stomach bug, also had diarrhea. Tolerating fluids right now. Reports that her blood sugars have been good even with this episode.  Vaginal bleeding -  No; Heartburn - No. Does feel easily winded over the last few days.     ROS:  GI: Constipation - No; Diarrhea - YES    Neuro: Headache -  yes when puking yesterday ; Visual change - No    Respiratory: Cough - No; SOB - No; fever - No     EXAM:  Vitals: /84   Wt 105 kg (230 lb 9.6 oz)   LMP 2024   BMI 39.58 kg/m²     Abdomen: See prenatal flowsheet   Urine glucose/protein: See prenatal flowsheet   Pelvic: See prenatal flowsheet     Prenatal Labs  Lab Results   Component Value Date    HGB 12.4 2024    RUBELLAABIGG 1.86 2024    HEPBSAG Non-Reactive 2024    ABORH A POS 2020    ABSCRN Negative 2024    VXW0KIV4 Non-Reactive 2024    HEPCVIRUSABY Non-Reactive 2024    URINECX 50,000 CFU/mL Normal Urogenital Winnie 2024    CHLAMNAA Negative 2024    NGONORRHON Negative 2024       MDM:  Impression: Supervision of high risk pregnancy  Previous C/S with planned repeat C/S  T1DM  with low risk NIPT   History of anemia - normal CBC 24  UTI early pregnancy - given cefuroxime 24        Topics discussed: Prenatal labs reviewed  Continue with Rx prenatal vitamins and baby aspirin  Discussed repeat C/S versus TOLAC. Patient desires repeat C/S   Received flu 24  Discussed increased risk of preeclampsia for patients with T1DM. Baseline preE panel normal, 24h . Taking Baby aspirin 162 mg for preeclampsia prevention.   Following with endocrinology  for DM. Discussed importance of good glucose control. Plans to get Dexcom later this week.   Recommend anatomy scan be performed at [facility] due to T1DM - ordered   Baseline EKG ordered   Reports recent diabetic eye exam - May 2024 and was normal                Follow up in 4 weeks.      Tonya Ren MD  Obstetrics and Gynecology  [facility]

## 2025-01-10 LAB — BACTERIA SPEC AEROBE CULT: NORMAL

## 2025-01-20 ENCOUNTER — PATIENT OUTREACH (OUTPATIENT)
Dept: LABOR AND DELIVERY | Facility: HOSPITAL | Age: 25
End: 2025-01-20
Payer: MEDICAID
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
## 2025-01-28 ENCOUNTER — OFFICE VISIT (OUTPATIENT)
Dept: ENDOCRINOLOGY | Facility: CLINIC | Age: 25
End: 2025-01-28
Payer: MEDICAID

## 2025-01-28 VITALS
HEIGHT: 64 IN | SYSTOLIC BLOOD PRESSURE: 118 MMHG | BODY MASS INDEX: 39.95 KG/M2 | DIASTOLIC BLOOD PRESSURE: 68 MMHG | WEIGHT: 234 LBS | OXYGEN SATURATION: 98 % | HEART RATE: 96 BPM

## 2025-01-28 DIAGNOSIS — E10.9 TYPE 1 DIABETES MELLITUS WITHOUT COMPLICATION: Primary | ICD-10-CM

## 2025-01-28 DIAGNOSIS — O24.012 PRE-EXISTING TYPE 1 DIABETES MELLITUS DURING PREGNANCY IN SECOND TRIMESTER: ICD-10-CM

## 2025-01-28 LAB
EXPIRATION DATE: NORMAL
HBA1C MFR BLD: 5.7 % (ref 4.5–5.7)
Lab: NORMAL

## 2025-01-28 NOTE — PROGRESS NOTES
Office Note      Date: 2025  Patient Name: Soeldad Quigley  MRN: [id number]  : 2000    Chief Complaint   Patient presents with    Diabetes       History of Present Illness:   Soledad Quigley is a 24 y.o. female who presents for pregnancy with Diabetes type 1.   Diagnosed:   Current RX: Tandem pump    Bg checks are done: continuously with Dexcom  Hypoglycemia :occasionally      Patient had gestational diabetes in , managed with diet and then returned to normal after delivery. Admitted with DKA in  and found to have positive pancreatic antibodies, dx changed to Type 1.    [place] 2025, having a boy.  Planned , not sure of date yet.    Currently 19 weeks pregnant. Appointment next week with high risk OB. Seeing her regular OB monthly as well. Feeling well. Appropriate weight gain. Has had a hard time with morning sickness with this pregnancy. Better now. Has had 3 ultrasounds and measuring appropriately.      Adjusted carb ratio at last visit and that has helped, still feels like this is working. Mornings are usually around 90. Staying around 120-130 after meals. Having to correct after meals sometimes but not consistently. Notes that when she corrects, has to increase the recommended insulin.     Patient adjusted basal rate back up at night since last visit. Was having highs overnight.      Last A1c:  Hemoglobin A1C   Date Value Ref Range Status   2025 5.7 4.5 - 5.7 % Final   2024 7.00 (H) 4.80 - 5.60 % Final     The last 2 weeks of CGM data are reviewed.  1 % are low  95 % are in range  3 % are 180-250  0 % are >250  GMI: 6.2  The glycemic pattern shows: Glucose averages consistently in target range, review of daily data shows some postprandial hyperglycemia up to 140-150, generally carb counts are working well to lower glucose levels postprandially    Changes in health since last visit: none.     Diabetic Complications:  Eyes: No  Kidneys: No  Feet: No  Heart:  "No      Subjective          Review of Systems:   Review of Systems   Constitutional:  Negative for activity change, appetite change and fatigue.   Respiratory:  Negative for chest tightness and shortness of breath.    Gastrointestinal:  Positive for nausea. Negative for abdominal pain.   Musculoskeletal:  Negative for myalgias.   Neurological:  Negative for numbness.   Psychiatric/Behavioral:  The patient is not nervous/anxious.        The following portions of the patient's history were reviewed and updated as appropriate: allergies, current medications, past family history, past medical history, past social history, past surgical history, and problem list.    Objective     Visit Vitals  /68   Pulse 96   Ht 162.6 cm (64\")   Wt 106 kg (234 lb)   LMP 09/06/2024   SpO2 98%   BMI 40.17 kg/m²           Physical Exam:  Physical Exam  Vitals and nursing note reviewed.   Constitutional:       Appearance: She is well-developed.   HENT:      Head: Normocephalic and atraumatic.   Eyes:      Conjunctiva/sclera: Conjunctivae normal.   Cardiovascular:      Rate and Rhythm: Normal rate.   Pulmonary:      Effort: Pulmonary effort is normal.   Musculoskeletal:         General: Normal range of motion.      Cervical back: Normal range of motion.   Skin:     General: Skin is warm and dry.   Psychiatric:         Behavior: Behavior normal.          Assessment / Plan      Assessment & Plan:  Diagnoses and all orders for this visit:    1. Type 1 diabetes mellitus without complication (Primary)    2. Pre-existing type 1 diabetes mellitus during pregnancy in second trimester  Assessment & Plan:  -Glycemic Targets during pregnancy were reviewed, as follows: fasting glucose <95, 1 hour post prandial <140, 2 hour postprandial <120.  Review of Tandem report shows well controlled glucose levels with some readings outside of pregnancy targets. Discussed with patient that using the pump on Control IQ can sometimes make it more difficult to " meet pregnancy requirements. Patient understands this risk and currently is doing well with Control IQ.    Adjusted correction factor from 1:50 to 1:45 so that patient is not having to increase from pump recommendations. Patient can lower this further if still not correctly adequately.     Asked her to check in with us in a week for review of Tandem report.    -Nutritional goals reviewed: Patient advised to eat 3 moderate sized meals daily as well as 2-4 snacks, including a bedtime snack. Discussed need for carbohydrate awareness. Patient given goals for carbohydrate intake for meals/snacks.    -Patient will return for follow up in 4 weeks. She was instructed to contact the office in the interim if glucoses not at target.      Orders:  -     Cancel: POC Glucose, Blood  -     POC Glycosylated Hemoglobin (Hb A1C)        Return in about 4 weeks (around 2/25/2025) for Follow up.    Portions of this note were completed with voice recognition program.  Electronically signed by Michelle Sanchez PA-C  [facility]  01/28/2025

## 2025-01-29 NOTE — ASSESSMENT & PLAN NOTE
-Glycemic Targets during pregnancy were reviewed, as follows: fasting glucose <95, 1 hour post prandial <140, 2 hour postprandial <120.  Review of Tandem report shows well controlled glucose levels with some readings outside of pregnancy targets. Discussed with patient that using the pump on Control IQ can sometimes make it more difficult to meet pregnancy requirements. Patient understands this risk and currently is doing well with Control IQ.    Adjusted correction factor from 1:50 to 1:45 so that patient is not having to increase from pump recommendations. Patient can lower this further if still not correctly adequately.     Asked her to check in with us in a week for review of Tandem report.    -Nutritional goals reviewed: Patient advised to eat 3 moderate sized meals daily as well as 2-4 snacks, including a bedtime snack. Discussed need for carbohydrate awareness. Patient given goals for carbohydrate intake for meals/snacks.    -Patient will return for follow up in 4 weeks. She was instructed to contact the office in the interim if glucoses not at target.

## 2025-02-04 ENCOUNTER — OFFICE VISIT (OUTPATIENT)
Dept: OBSTETRICS AND GYNECOLOGY | Facility: HOSPITAL | Age: 25
End: 2025-02-04
Payer: MEDICAID

## 2025-02-04 ENCOUNTER — ROUTINE PRENATAL (OUTPATIENT)
Dept: OBSTETRICS AND GYNECOLOGY | Facility: CLINIC | Age: 25
End: 2025-02-04
Payer: MEDICAID

## 2025-02-04 ENCOUNTER — HOSPITAL ENCOUNTER (OUTPATIENT)
Dept: WOMENS IMAGING | Facility: HOSPITAL | Age: 25
Discharge: HOME | End: 2025-02-04
Admitting: STUDENT IN AN ORGANIZED HEALTH CARE EDUCATION/TRAINING PROGRAM
Payer: MEDICAID

## 2025-02-04 VITALS — BODY MASS INDEX: 39.99 KG/M2 | WEIGHT: 233 LBS | DIASTOLIC BLOOD PRESSURE: 79 MMHG | SYSTOLIC BLOOD PRESSURE: 124 MMHG

## 2025-02-04 VITALS — SYSTOLIC BLOOD PRESSURE: 126 MMHG | WEIGHT: 233 LBS | DIASTOLIC BLOOD PRESSURE: 84 MMHG | BODY MASS INDEX: 39.99 KG/M2

## 2025-02-04 DIAGNOSIS — E10.9 TYPE 1 DIABETES MELLITUS WITHOUT COMPLICATION: Primary | ICD-10-CM

## 2025-02-04 DIAGNOSIS — O34.219 PREVIOUS CESAREAN DELIVERY AFFECTING PREGNANCY: ICD-10-CM

## 2025-02-04 DIAGNOSIS — O24.012 PRE-EXISTING TYPE 1 DIABETES MELLITUS DURING PREGNANCY IN SECOND TRIMESTER: Primary | ICD-10-CM

## 2025-02-04 DIAGNOSIS — O09.92 HIGH-RISK PREGNANCY IN SECOND TRIMESTER: ICD-10-CM

## 2025-02-04 DIAGNOSIS — O24.012 PRE-EXISTING TYPE 1 DIABETES MELLITUS DURING PREGNANCY IN SECOND TRIMESTER: ICD-10-CM

## 2025-02-04 PROCEDURE — 76811 OB US DETAILED SNGL FETUS: CPT

## 2025-02-04 NOTE — LETTER
2025     Tonya Ren MD  [address]  [address]  [place] [address]    Patient: Soledad Quigley   YOB: 2000   Date of Visit: 2025       Dear Tonya Ren MD,    Thank you for referring Soledad Quigley to me for evaluation. Below is a copy of my consult note.    If you have questions, please do not hesitate to call me. I look forward to following Soledad along with you.         Sincerely,        Zhao Hunter MD        CC: No Recipients    Patient denies any leaking fluid, vaginal bleeding, or contractions.  NIPT low risk.  Patient reports next follow-up appointment with Dr. Ren' office is today.          Maternal/Fetal Medicine Consult Note   Date: 2025  Name: Soledad Quigley    : 2000     MRN: [id number]     Referring Provider: Tonya Ren MD    Chief Complaint  T1DM; Prev c/s; Obesity    Subjective     History of Present Illness:  Soledad Quigley is a 24 y.o.  20w1d who presents today for type 1 diabetes    BERLIN: Estimated Date of Delivery: 25     ROS:   Otherwise Noted in HPI    Past Medical History:   Diagnosis Date   • Anxiety and depression     Off meds ~    • Childhood asthma    • Diabetes type 2, controlled 2023   • Type 1 diabetes mellitus    • Vitamin D deficiency       Past Surgical History:   Procedure Laterality Date   •  SECTION  2020      OB History          2    Para   1    Term   1            AB        Living   1         SAB        IAB        Ectopic        Molar        Multiple        Live Births   1          Obstetric Comments   2020 - Aracelis (FOB #1)  Current (FOB #2)               Current Outpatient Medications:   •  aspirin 81 MG EC tablet, Take 2 tablets by mouth Daily., Disp: 30 tablet, Rfl: 11  •  Blood Glucose Monitoring Suppl (FreeStyle Lite) w/Device kit, Use as directed to check blood sugar, Disp: 1 kit, Rfl: 0  •  Continuous Glucose Sensor (Dexcom G7 Sensor) misc, Use 1 each Every 10  "(Ten) Days., Disp: 9 each, Rfl: 4  •  glucose blood test strip, Use to test blood glucose up to four times daily as needed., Disp: 100 each, Rfl: 12  •  Insulin Lispro, 1 Unit Dial, (HUMALOG) 100 UNIT/ML solution pen-injector, Use as directed 3x daily; max daily dose 100u, Disp: 90 mL, Rfl: 3  •  [employer] Pen Needles 32G X 4 MM misc, USE  FOUR TIMES PER DAY AS NEEDED FOR INSULIN INJECTIONS., Disp: 100 each, Rfl: 12  •  Lancets (freestyle) lancets, Use 1 each 4 (Four) Times a Day., Disp: 100 each, Rfl: 12  •  ondansetron (Zofran) 4 MG tablet, Take 1 tablet by mouth Daily As Needed for Nausea or Vomiting., Disp: 30 tablet, Rfl: 1  •  Prenatal Vit-Fe Fumarate-FA (prenatal vitamin 27-0.8) 27-0.8 MG tablet tablet, Take  by mouth Daily., Disp: , Rfl:   •  vitamin D (ERGOCALCIFEROL) 1.25 MG (93928 UT) capsule capsule, Take 1 capsule by mouth 1 (One) Time Per Week., Disp: 8 capsule, Rfl: 0  •  Continuous Glucose Sensor (FreeStyle Carl 3 Sensor) misc, Use 1 each Every 14 (Fourteen) Days. (Patient not taking: Reported on 2025), Disp: 6 each, Rfl: 3  •  insulin detemir (Levemir FlexPen) 100 UNIT/ML injection, Inject 50 Units under the skin into the appropriate area as directed Daily. Titate to fasting glucose <90.  Max daily dose 100u. (Patient not taking: Reported on 2025), Disp: 90 mL, Rfl: 3    Current Facility-Administered Medications:   •  cyanocobalamin injection 1,000 mcg, 1,000 mcg, Intramuscular, Q28 Days, Marta Chiu MD, 1,000 mcg at 23 1130    Objective     Vital Signs  /79   Wt 106 kg (233 lb)   LMP 2024   Estimated body mass index is 39.99 kg/m² as calculated from the following:    Height as of 25: 162.6 cm (64\").    Weight as of this encounter: 106 kg (233 lb).    Ultrasound Impression:   See Viewpoint     Assessment and Plan     Soledad Quigley is a 24 y.o.  20w1d who presents today for type 1 diabetes    Diagnoses and all orders for this visit:    1. Diabetes in " pregnancy ([place]) (Primary)  Assessment & Plan:  Pregestational diabetics have an 8-10% risk for having a child with a congenital malformation.  The risk is lower if the patient has good glycemic control prior to conception. Women who have a normal hemoglobin A1C, have an incidence of birth defects comparable to the general population risk of 3-5%.  Women whose hemoglobin A1C is elevated have the highest risk for fetal malformations and the risk is proportional to the degree of elevation. The most frequent malformations found in infants of diabetic mothers are heart defects. The infants may also have neural tube defects, caudal regression, and limb abnormalities, including femoral hypoplasia.  A periconceptual A1C of >8.5% has been associated with a >20% risk for congenital anomalies.  When the HgbA1C is severely elevated there is also an increased risk of miscarriage.      Today we discussed the risks of diabetes and pregnancy including maternal risks: increasing insulin requirements, operative delivery, preeclampsia, and infection; as well as fetal risks: malformations as discussed above, growth abnormalities, iatrogenic  birth and stillbirth.   morbidities among infants of diabetic mothers include hypoglycemia, hyperbilirubinemia, and metabolic instability.     I have given her our goals for pregnancy glucose control of fasting glucose 95, 2 hr postprandial glucose <120, and 1 hr postprandial glucose <140.  She is going to keep track of these 4 values daily     Recommendations:  -Currently being followed by weekly review with endocrinology.  -Fetal echocardiogram at 24-26 weeks  -Serial growth assessment (every 4 wks)  - testing: to begin at 32 wks unless indication develops prior.               Follow Up  4 weeks.     I spent 15 minutes caring for the patient on the day of service. This included: obtaining or reviewing a separately obtained medical history, reviewing patient records,  performing a medically appropriate exam and/or evaluation, counseling or educating the patient/family/caregiver, ordering medications, labs, and/or procedures and documenting such in the medical record. This does not include time spent on review and interpretation of other tests such as fetal ultrasound or the performance of other procedures such as amniocentesis or CVS.      Zhao Hunter MD, FACOG  Maternal Fetal Medicine, [facility] [place]

## 2025-02-04 NOTE — PROGRESS NOTES
Maternal/Fetal Medicine Consult Note   Date: 2025  Name: Soledad Quigley    : 2000     MRN: [id number]     Referring Provider: Tonya Ren MD    Chief Complaint  T1DM; Prev c/s; Obesity    Subjective     History of Present Illness:  Soledad Quigley is a 24 y.o.  20w1d who presents today for type 1 diabetes    BERLIN: Estimated Date of Delivery: 25     ROS:   Otherwise Noted in HPI    Past Medical History:   Diagnosis Date    Anxiety and depression     Off meds ~     Childhood asthma     Diabetes type 2, controlled 2023    Type 1 diabetes mellitus     Vitamin D deficiency       Past Surgical History:   Procedure Laterality Date     SECTION  2020      OB History          2    Para   1    Term   1            AB        Living   1         SAB        IAB        Ectopic        Molar        Multiple        Live Births   1          Obstetric Comments    - Aracelis (FOB #1)  Current (FOB #2)               Current Outpatient Medications:     aspirin 81 MG EC tablet, Take 2 tablets by mouth Daily., Disp: 30 tablet, Rfl: 11    Blood Glucose Monitoring Suppl (FreeStyle Lite) w/Device kit, Use as directed to check blood sugar, Disp: 1 kit, Rfl: 0    Continuous Glucose Sensor (Dexcom G7 Sensor) misc, Use 1 each Every 10 (Ten) Days., Disp: 9 each, Rfl: 4    glucose blood test strip, Use to test blood glucose up to four times daily as needed., Disp: 100 each, Rfl: 12    Insulin Lispro, 1 Unit Dial, (HUMALOG) 100 UNIT/ML solution pen-injector, Use as directed 3x daily; max daily dose 100u, Disp: 90 mL, Rfl: 3    [employer]ger Pen Needles 32G X 4 MM misc, USE  FOUR TIMES PER DAY AS NEEDED FOR INSULIN INJECTIONS., Disp: 100 each, Rfl: 12    Lancets (freestyle) lancets, Use 1 each 4 (Four) Times a Day., Disp: 100 each, Rfl: 12    ondansetron (Zofran) 4 MG tablet, Take 1 tablet by mouth Daily As Needed for Nausea or Vomiting., Disp: 30 tablet, Rfl: 1    Prenatal Vit-Fe  "Fumarate-FA (prenatal vitamin 27-0.8) 27-0.8 MG tablet tablet, Take  by mouth Daily., Disp: , Rfl:     vitamin D (ERGOCALCIFEROL) 1.25 MG (33966 UT) capsule capsule, Take 1 capsule by mouth 1 (One) Time Per Week., Disp: 8 capsule, Rfl: 0    Continuous Glucose Sensor (FreeStyle Carl 3 Sensor) misc, Use 1 each Every 14 (Fourteen) Days. (Patient not taking: Reported on 2025), Disp: 6 each, Rfl: 3    insulin detemir (Levemir FlexPen) 100 UNIT/ML injection, Inject 50 Units under the skin into the appropriate area as directed Daily. Titate to fasting glucose <90.  Max daily dose 100u. (Patient not taking: Reported on 2025), Disp: 90 mL, Rfl: 3    Current Facility-Administered Medications:     cyanocobalamin injection 1,000 mcg, 1,000 mcg, Intramuscular, Q28 Days, Marta Chiu MD, 1,000 mcg at 23 1130    Objective     Vital Signs  /79   Wt 106 kg (233 lb)   LMP 2024   Estimated body mass index is 39.99 kg/m² as calculated from the following:    Height as of 25: 162.6 cm (64\").    Weight as of this encounter: 106 kg (233 lb).    Ultrasound Impression:   See Viewpoint     Assessment and Plan     Soledad Quigley is a 24 y.o.  20w1d who presents today for type 1 diabetes    Diagnoses and all orders for this visit:    1. Diabetes in pregnancy ([place]) (Primary)  Assessment & Plan:  Pregestational diabetics have an 8-10% risk for having a child with a congenital malformation.  The risk is lower if the patient has good glycemic control prior to conception. Women who have a normal hemoglobin A1C, have an incidence of birth defects comparable to the general population risk of 3-5%.  Women whose hemoglobin A1C is elevated have the highest risk for fetal malformations and the risk is proportional to the degree of elevation. The most frequent malformations found in infants of diabetic mothers are heart defects. The infants may also have neural tube defects, caudal regression, and limb " abnormalities, including femoral hypoplasia.  A periconceptual A1C of >8.5% has been associated with a >20% risk for congenital anomalies.  When the HgbA1C is severely elevated there is also an increased risk of miscarriage.      Today we discussed the risks of diabetes and pregnancy including maternal risks: increasing insulin requirements, operative delivery, preeclampsia, and infection; as well as fetal risks: malformations as discussed above, growth abnormalities, iatrogenic  birth and stillbirth.   morbidities among infants of diabetic mothers include hypoglycemia, hyperbilirubinemia, and metabolic instability.     I have given her our goals for pregnancy glucose control of fasting glucose 95, 2 hr postprandial glucose <120, and 1 hr postprandial glucose <140.  She is going to keep track of these 4 values daily     Recommendations:  -Currently being followed by weekly review with endocrinology.  -Fetal echocardiogram at 24-26 weeks  -Serial growth assessment (every 4 wks)  - testing: to begin at 32 wks unless indication develops prior.               Follow Up  4 weeks.     I spent 15 minutes caring for the patient on the day of service. This included: obtaining or reviewing a separately obtained medical history, reviewing patient records, performing a medically appropriate exam and/or evaluation, counseling or educating the patient/family/caregiver, ordering medications, labs, and/or procedures and documenting such in the medical record. This does not include time spent on review and interpretation of other tests such as fetal ultrasound or the performance of other procedures such as amniocentesis or CVS.      Zhao Hunter MD, FACOG  Maternal Fetal Medicine, [facility]

## 2025-02-04 NOTE — PROGRESS NOTES
Chief Complaint   Patient presents with    Routine Prenatal Visit     Saw PDC today / no c/c       HPI: Soledad is a  currently at 20w1d who today reports the following:    Feeling well today, no concerns. Saw PDC today and US was overall reassuring. Has been following sugars with endocrinology.     ROS:  GI: Constipation - No; Diarrhea - No    Neuro: Headache - No; Visual change - No    Respiratory: Cough - No; SOB - No; fever - No     EXAM:  Vitals: /84   Wt 106 kg (233 lb)   LMP 2024   BMI 39.99 kg/m²     Abdomen: See prenatal flowsheet   Urine glucose/protein: See prenatal flowsheet   Pelvic: See prenatal flowsheet     Prenatal Labs  Lab Results   Component Value Date    HGB 12.4 2024    RUBELLAABIGG 1.86 2024    HEPBSAG Non-Reactive 2024    ABORH A POS 2020    ABSCRN Negative 2024    ZFF1BSQ8 Non-Reactive 2024    HEPCVIRUSABY Non-Reactive 2024    URINECX 25,000 CFU/mL Normal Urogenital Winnie 2025    CHLAMNAA Negative 2024    NGONORRHON Negative 2024       MDM:  Impression: Supervision of high risk pregnancy  Previous C/S with planned repeat C/S  T1DM  with low risk NIPT   History of anemia - normal CBC 24  UTI early pregnancy - given cefuroxime 24   Desires sterilization        Topics discussed: Prenatal labs reviewed  Continue with Rx prenatal vitamins and baby aspirin  Discussed repeat C/S versus TOLAC. Patient desires repeat C/S   Received flu 24  Discussed increased risk of preeclampsia for patients with T1DM. Baseline preE panel normal, 24h . Taking Baby aspirin 162 mg for preeclampsia prevention.   Following with endocrinology for DM.   Anatomy scan today at [facility] overall normal, but some suboptimal views. Plans for repeat scan in 4 weeks as well as fetal echo.   Baseline EKG ordered   Reports recent diabetic eye exam - May 2024 and was normal   Plan for growth US every 4 weeks and ANT at 32 weeks                 Follow up in 4 weeks.      Tonya Ren MD  Obstetrics and Gynecology  [facility]

## 2025-02-04 NOTE — PROGRESS NOTES
Patient denies any leaking fluid, vaginal bleeding, or contractions.  NIPT low risk.  Patient reports next follow-up appointment with Dr. Ren' office is today.

## 2025-02-04 NOTE — ASSESSMENT & PLAN NOTE
Pregestational diabetics have an 8-10% risk for having a child with a congenital malformation.  The risk is lower if the patient has good glycemic control prior to conception. Women who have a normal hemoglobin A1C, have an incidence of birth defects comparable to the general population risk of 3-5%.  Women whose hemoglobin A1C is elevated have the highest risk for fetal malformations and the risk is proportional to the degree of elevation. The most frequent malformations found in infants of diabetic mothers are heart defects. The infants may also have neural tube defects, caudal regression, and limb abnormalities, including femoral hypoplasia.  A periconceptual A1C of >8.5% has been associated with a >20% risk for congenital anomalies.  When the HgbA1C is severely elevated there is also an increased risk of miscarriage.      Today we discussed the risks of diabetes and pregnancy including maternal risks: increasing insulin requirements, operative delivery, preeclampsia, and infection; as well as fetal risks: malformations as discussed above, growth abnormalities, iatrogenic  birth and stillbirth.   morbidities among infants of diabetic mothers include hypoglycemia, hyperbilirubinemia, and metabolic instability.     I have given her our goals for pregnancy glucose control of fasting glucose 95, 2 hr postprandial glucose <120, and 1 hr postprandial glucose <140.  She is going to keep track of these 4 values daily     Recommendations:  -Currently being followed by weekly review with endocrinology.  -Fetal echocardiogram at 24-26 weeks  -Serial growth assessment (every 4 wks)  - testing: to begin at 32 wks unless indication develops prior.

## 2025-02-05 ENCOUNTER — TELEPHONE (OUTPATIENT)
Dept: ENDOCRINOLOGY | Facility: CLINIC | Age: 25
End: 2025-02-05
Payer: MEDICAID

## 2025-02-05 NOTE — TELEPHONE ENCOUNTER
PATIENT IS PREGNANT AND IS HAVING HIGHER BLOOD SUGARS. SHE NEEDS TO BE ADVISED WHAT TO DO ABOUT BLOOD SUGARS AND INSULIN. PATIENTS NUMBER [phone]

## 2025-02-05 NOTE — TELEPHONE ENCOUNTER
Please let her know that I reviewed her report and settings. She can reduce her correction factor from 1:45 to 1:40. Let us know later this week or next week how that is working.

## 2025-02-05 NOTE — TELEPHONE ENCOUNTER
Called pt and informed them that her report and settings were reviewed by one of our providers, and that pt was to reduce her correction factor from 1:45 to 1:40 and to let us know later this week or next week how that is working for them. Pt verbalized understanding.

## 2025-02-28 ENCOUNTER — HOSPITAL ENCOUNTER (OUTPATIENT)
Dept: CARDIOLOGY | Facility: HOSPITAL | Age: 25
Discharge: HOME | End: 2025-02-28
Payer: MEDICAID

## 2025-02-28 DIAGNOSIS — O24.012 PRE-EXISTING TYPE 1 DIABETES MELLITUS DURING PREGNANCY IN SECOND TRIMESTER: ICD-10-CM

## 2025-02-28 LAB
QT INTERVAL: 404 MS
QTC INTERVAL: 465 MS

## 2025-02-28 PROCEDURE — 93005 ELECTROCARDIOGRAM TRACING: CPT | Performed by: STUDENT IN AN ORGANIZED HEALTH CARE EDUCATION/TRAINING PROGRAM

## 2025-03-03 ENCOUNTER — PATIENT MESSAGE (OUTPATIENT)
Dept: ENDOCRINOLOGY | Facility: CLINIC | Age: 25
End: 2025-03-03

## 2025-03-03 ENCOUNTER — PATIENT OUTREACH (OUTPATIENT)
Dept: LABOR AND DELIVERY | Facility: HOSPITAL | Age: 25
End: 2025-03-03
Payer: MEDICAID

## 2025-03-03 ENCOUNTER — OFFICE VISIT (OUTPATIENT)
Dept: ENDOCRINOLOGY | Facility: CLINIC | Age: 25
End: 2025-03-03
Payer: MEDICAID

## 2025-03-03 VITALS
OXYGEN SATURATION: 98 % | DIASTOLIC BLOOD PRESSURE: 80 MMHG | WEIGHT: 241 LBS | HEIGHT: 64 IN | SYSTOLIC BLOOD PRESSURE: 120 MMHG | HEART RATE: 90 BPM | BODY MASS INDEX: 41.15 KG/M2

## 2025-03-03 DIAGNOSIS — O24.012 PRE-EXISTING TYPE 1 DIABETES MELLITUS DURING PREGNANCY IN SECOND TRIMESTER: Primary | ICD-10-CM

## 2025-03-03 LAB
EXPIRATION DATE: NORMAL
EXPIRATION DATE: NORMAL
GLUCOSE BLDC GLUCOMTR-MCNC: 109 MG/DL (ref 70–130)
HBA1C MFR BLD: 5.5 % (ref 4.5–5.7)
Lab: NORMAL
Lab: NORMAL

## 2025-03-03 PROCEDURE — 3044F HG A1C LEVEL LT 7.0%: CPT | Performed by: PHYSICIAN ASSISTANT

## 2025-03-03 PROCEDURE — 83036 HEMOGLOBIN GLYCOSYLATED A1C: CPT | Performed by: PHYSICIAN ASSISTANT

## 2025-03-03 PROCEDURE — 99213 OFFICE O/P EST LOW 20 MIN: CPT | Performed by: PHYSICIAN ASSISTANT

## 2025-03-03 PROCEDURE — 95251 CONT GLUC MNTR ANALYSIS I&R: CPT | Performed by: PHYSICIAN ASSISTANT

## 2025-03-03 RX ORDER — INSULIN INFUSION SET/CARTRIDGE
COMBINATION PACKAGE (EA) MISCELLANEOUS
COMMUNITY

## 2025-03-03 NOTE — PROGRESS NOTES
Chief Complaint   Patient presents with    Gestational Diabetes       Subjective     Soledad Quigley is a 24 y.o. female.        History of Present Illness     Patient had gestational diabetes in , managed with diet and then returned to normal after delivery. Admitted with DKA in  and found to have positive pancreatic antibodies, dx changed to Type 1.    Currently pregnant, 24 weeks  BERLIN 2025, having a boy.  Planned , not sure of date yet.  Did see high risk OB and they have deferred DM management to Endo.    Patient presents for follow-up of gestational diabetes.  She was last seen 25 and reports since that time had increase of blood sugars staying high after insulin administration. Called the office and correction factor was changed from 1:45 to 1:40. This has helped but still having to add some correction.   No frequent hypoglycemia.    At last visit we dicussed  the importance of regular physical activity and diet changes to help control her BG along with insulin .     Physical activity: needs to increase physical activity; she is active when working on Thursday and     Diet:   Breakfast/lunch: usually later in the day- eats eggs, sausage, donis, sometimes protein pancakes or a protein shake  Late lunch: burrito bowl, rice, beans, protein; protein with vegetables  Dinner: protein/vegetable    Eats out often, around 50/50- more on the weekends    The last 2 weeks of CGM data are reviewed.  1.6 % are low  93 % are in range  5.2 % are 180-250  0 % are >250  GMI: 6.2  The glycemic pattern shows: glucose averages generally in target range with some post prandial hyperglycemia        Current Outpatient Medications:     aspirin 81 MG EC tablet, Take 2 tablets by mouth Daily., Disp: 30 tablet, Rfl: 11    Blood Glucose Monitoring Suppl (FreeStyle Lite) w/Device kit, Use as directed to check blood sugar (Patient not taking: Reported on 3/4/2025), Disp: 1 kit, Rfl: 0    Continuous  Glucose Sensor (Dexcom G7 Sensor) misc, Use 1 each Every 10 (Ten) Days., Disp: 9 each, Rfl: 4    glucose blood test strip, Use to test blood glucose up to four times daily as needed., Disp: 100 each, Rfl: 12    insulin detemir (Levemir FlexPen) 100 UNIT/ML injection, Inject 50 Units under the skin into the appropriate area as directed Daily. Titate to fasting glucose <90.  Max daily dose 100u., Disp: 90 mL, Rfl: 3    Insulin Infusion Pump Supplies (Tandem Mobi AutoSoft XC Kit) misc, Use., Disp: , Rfl:     Insulin Lispro, 1 Unit Dial, (HUMALOG) 100 UNIT/ML solution pen-injector, Use as directed 3x daily; max daily dose 100u, Disp: 90 mL, Rfl: 3    K[employer]ger Pen Needles 32G X 4 MM misc, USE  FOUR TIMES PER DAY AS NEEDED FOR INSULIN INJECTIONS., Disp: 100 each, Rfl: 12    Lancets (freestyle) lancets, Use 1 each 4 (Four) Times a Day., Disp: 100 each, Rfl: 12    ondansetron (Zofran) 4 MG tablet, Take 1 tablet by mouth Daily As Needed for Nausea or Vomiting., Disp: 30 tablet, Rfl: 1    Prenatal Vit-Fe Fumarate-FA (prenatal vitamin 27-0.8) 27-0.8 MG tablet tablet, Take  by mouth Daily., Disp: , Rfl:     vitamin D (ERGOCALCIFEROL) 1.25 MG (37824 UT) capsule capsule, Take 1 capsule by mouth 1 (One) Time Per Week., Disp: 8 capsule, Rfl: 0    Continuous Glucose Sensor (FreeStyle Carl 3 Sensor) misc, Use 1 each Every 14 (Fourteen) Days. (Patient not taking: Reported on 3/4/2025), Disp: 6 each, Rfl: 3    Current Facility-Administered Medications:     cyanocobalamin injection 1,000 mcg, 1,000 mcg, Intramuscular, Q28 Days, Marta Chiu MD, 1,000 mcg at 08/25/23 1130     [facility]  The following portions of the patient's history were reviewed and updated as appropriate: allergies, current medications, past family history, past medical history, past social history, past surgical history, and problem list.    Review of Systems   Constitutional:  Negative for activity change, appetite change and fatigue.   Respiratory:  Negative  "for chest tightness and shortness of breath.    Gastrointestinal:  Negative for abdominal pain.   Musculoskeletal:  Negative for myalgias.   Psychiatric/Behavioral:  The patient is not nervous/anxious.        Objective   /80   Pulse 90   Ht 162.6 cm (64\")   Wt 109 kg (241 lb)   LMP 09/06/2024   SpO2 98%   BMI 41.37 kg/m²     Physical Exam  Vitals and nursing note reviewed.   Constitutional:       Appearance: She is well-developed.   HENT:      Head: Normocephalic and atraumatic.   Eyes:      Conjunctiva/sclera: Conjunctivae normal.   Cardiovascular:      Rate and Rhythm: Normal rate and regular rhythm.   Pulmonary:      Effort: Pulmonary effort is normal.      Breath sounds: Normal breath sounds.   Musculoskeletal:         General: Normal range of motion.      Cervical back: Normal range of motion.   Skin:     General: Skin is warm and dry.   Psychiatric:         Behavior: Behavior normal.         Lab Results   Component Value Date    HGBA1C 5.5 03/03/2025       ASSESSMENT/PLAN    Diagnoses and all orders for this visit:    1. Pre-existing type 1 diabetes mellitus during pregnancy in second trimester (Primary)  Assessment & Plan:  Diabetes is stable.   Continue current treatment regimen.    Review of tandem pump report shows generally good glucose control. Correction doses do not appear to be fully effective. Changed correction factor from 1:40 to 1:35 from 9 am -12 am.    Encouraged patient to increase exercise and continue focusing on keeping carb amounts for meals at less than 65-70 g. Keep working on eating more at home.     -Glycemic Targets during pregnancy were reviewed, as follows: fasting glucose <95, 1 hour post prandial <140, 2 hour postprandial <120.    -Patient will return for follow up in 4 weeks. She was instructed to contact the office in the interim if glucoses not at target.      Orders:  -     POC Glucose, Blood  -     POC Glycosylated Hemoglobin (Hb A1C)             Return in about 4 " weeks (around 3/31/2025) for Follow up.    Portions of this note were completed with voice recognition program.    Electronically signed by ELIEZER Young

## 2025-03-03 NOTE — OUTREACH NOTE
[facility]  Check-In    Current Estimated Gestational Age: 24w0d      Questions/Answers      Flowsheet Row Responses   Best Method for Contacting Cell   Demographics Reviewed Yes   Currently Employed No   Able to keep appointments as scheduled Yes   Baby Active/Feeling Fetal Movemen Yes   How are you presently feeling? Good   Are you having any of the following symptoms? --  [Denies]   Questions regarding prenatal visits or tests to be ordered? No   Equipment presently used at home: Other   Other Equipment: Dexcom, insulin pump   Education related to new diagnoses/home equipment No   Resource/Environmental Concerns None   Do you have any questions related to your care experience, your pregnancy, plans for delivery, any concerns, etc? No            Feeling well. Starting to feel regular fetal movement. Denies any concerning symptoms. States A1c is improving.    No urgent needs identified and denies any questions. Contact information provided and encouraged to call with questions, concerns, or resource needs. VU Will f/u around 28 weeks.    MAGDA Hamilton RN  Maternity Nurse Navigator    3/3/2025, 13:57 EST

## 2025-03-04 ENCOUNTER — ROUTINE PRENATAL (OUTPATIENT)
Dept: OBSTETRICS AND GYNECOLOGY | Facility: CLINIC | Age: 25
End: 2025-03-04
Payer: MEDICAID

## 2025-03-04 VITALS — SYSTOLIC BLOOD PRESSURE: 128 MMHG | WEIGHT: 243.6 LBS | BODY MASS INDEX: 41.81 KG/M2 | DIASTOLIC BLOOD PRESSURE: 84 MMHG

## 2025-03-04 DIAGNOSIS — E10.9 TYPE 1 DIABETES MELLITUS WITHOUT COMPLICATION: ICD-10-CM

## 2025-03-04 DIAGNOSIS — O34.219 PREVIOUS CESAREAN DELIVERY AFFECTING PREGNANCY: ICD-10-CM

## 2025-03-04 DIAGNOSIS — O09.92 HIGH-RISK PREGNANCY IN SECOND TRIMESTER: Primary | ICD-10-CM

## 2025-03-04 NOTE — PROGRESS NOTES
Chief Complaint   Patient presents with    Routine Prenatal Visit     Wants to talk about EKG results from last week / vaginal dryness (did not happen with previous pregnancy)     Soledad Quigley is a 24 y.o.  at 24w1d who presents for follow up prenatal care. Overall, she is feeling well. Some vaginal dryness, especially during intercourse. She denies vaginal bleeding, leakage of fluid or contractions. Baby is moving. Reports blood sugars going well -- wears Dexcom and insulin pump     Pregnancy is complicated by:  - T1DM - insulin pump and Dexcom, follows with endocrinology   - History of 1 prior  section     /84   Wt 110 kg (243 lb 9.6 oz)   LMP 2024   BMI 41.81 kg/m²    General: No acute distress, sitting comfortably   CV: Regular heart rate  Lungs: Breathing unlabored  Abdomen: Soft, nontender, gravid,S=D   Pelvic: deferred      ASSESSMENT/PLAN    Routine prenatal care   - Pap ASCUS HPV neg  - will repeat postpartum   - Dating by ultrasound at 9 weeks gestation   - Prenatal labs: normal except UTI in early pregnancy (CURT neg)   - Testing for GC / Chlamydia / trichomonas: neg  - Genetic testing: low risk   - 20 weeks: Prenatal anatomy ultrasound at [facility] normal but some suboptimal views   - 28 weeks: Tdap, H/H, RPR testing - ordered for next visit   - 36 weeks: GBS testing   - Continue prenatal vitamins and aspirin   - Recommend aspirin 81 mg for preeclampsia prevention at 12 weeks   - UTD on flu shot     2. T1DM    - Follows with endocrinology and PDC    - Normal baseline preE labs, taking     - baseline EKG with borderline LVH -- will plan to repeat at 32 weeks    - Fetal echo 24-26 weeks    - Serial growth US q 4 weeks    -  testing at 32 weeks    - Delivery 38-39 weeks pending glucose control     3. History of  section    - Desires repeat C/S. Will plan to schedule at 38-39 weeks.     Follow up in 4 weeks.     This note was electronically  signed.    Tonya Ren MD  Obstetrics and Gynecology  [facility]

## 2025-03-05 NOTE — ASSESSMENT & PLAN NOTE
Diabetes is stable.   Continue current treatment regimen.    Review of tandem pump report shows generally good glucose control. Correction doses do not appear to be fully effective. Changed correction factor from 1:40 to 1:35 from 9 am -12 am.    Encouraged patient to increase exercise and continue focusing on keeping carb amounts for meals at less than 65-70 g. Keep working on eating more at home.     -Glycemic Targets during pregnancy were reviewed, as follows: fasting glucose <95, 1 hour post prandial <140, 2 hour postprandial <120.    -Patient will return for follow up in 4 weeks. She was instructed to contact the office in the interim if glucoses not at target.

## 2025-03-11 ENCOUNTER — PATIENT MESSAGE (OUTPATIENT)
Dept: ENDOCRINOLOGY | Facility: CLINIC | Age: 25
End: 2025-03-11
Payer: MEDICAID

## 2025-03-11 ENCOUNTER — OFFICE VISIT (OUTPATIENT)
Dept: OBSTETRICS AND GYNECOLOGY | Facility: HOSPITAL | Age: 25
End: 2025-03-11
Payer: MEDICAID

## 2025-03-11 ENCOUNTER — HOSPITAL ENCOUNTER (OUTPATIENT)
Dept: WOMENS IMAGING | Facility: HOSPITAL | Age: 25
Discharge: HOME | End: 2025-03-11
Admitting: STUDENT IN AN ORGANIZED HEALTH CARE EDUCATION/TRAINING PROGRAM
Payer: MEDICAID

## 2025-03-11 VITALS — SYSTOLIC BLOOD PRESSURE: 128 MMHG | WEIGHT: 242 LBS | DIASTOLIC BLOOD PRESSURE: 75 MMHG | BODY MASS INDEX: 41.54 KG/M2

## 2025-03-11 DIAGNOSIS — O24.012 PRE-EXISTING TYPE 1 DIABETES MELLITUS DURING PREGNANCY IN SECOND TRIMESTER: ICD-10-CM

## 2025-03-11 DIAGNOSIS — O34.219 PREVIOUS CESAREAN DELIVERY AFFECTING PREGNANCY: Primary | ICD-10-CM

## 2025-03-11 DIAGNOSIS — E66.01 MORBID OBESITY WITH BMI OF 40.0-44.9, ADULT: ICD-10-CM

## 2025-03-11 PROCEDURE — 76816 OB US FOLLOW-UP PER FETUS: CPT

## 2025-03-11 PROCEDURE — 93325 DOPPLER ECHO COLOR FLOW MAPG: CPT

## 2025-03-11 PROCEDURE — 76825 ECHO EXAM OF FETAL HEART: CPT

## 2025-03-11 NOTE — PROGRESS NOTES
Patient denies any leaking fluid, vaginal bleeding, or contractions.  NIPT negative.  Patient reports next follow-up appointment with Dr. Ren' office is 4/1.

## 2025-03-27 ENCOUNTER — TELEPHONE (OUTPATIENT)
Dept: ENDOCRINOLOGY | Facility: CLINIC | Age: 25
End: 2025-03-27
Payer: MEDICAID

## 2025-04-01 ENCOUNTER — LAB (OUTPATIENT)
Dept: LAB | Facility: HOSPITAL | Age: 25
End: 2025-04-01
Payer: MEDICAID

## 2025-04-01 ENCOUNTER — ROUTINE PRENATAL (OUTPATIENT)
Dept: OBSTETRICS AND GYNECOLOGY | Facility: CLINIC | Age: 25
End: 2025-04-01
Payer: MEDICAID

## 2025-04-01 VITALS — WEIGHT: 243.8 LBS | BODY MASS INDEX: 41.85 KG/M2 | DIASTOLIC BLOOD PRESSURE: 86 MMHG | SYSTOLIC BLOOD PRESSURE: 134 MMHG

## 2025-04-01 DIAGNOSIS — E10.9 TYPE 1 DIABETES MELLITUS WITHOUT COMPLICATION: ICD-10-CM

## 2025-04-01 DIAGNOSIS — O34.219 PREVIOUS CESAREAN DELIVERY AFFECTING PREGNANCY: ICD-10-CM

## 2025-04-01 DIAGNOSIS — O24.012 PRE-EXISTING TYPE 1 DIABETES MELLITUS DURING PREGNANCY IN SECOND TRIMESTER: Primary | ICD-10-CM

## 2025-04-01 DIAGNOSIS — O09.92 HIGH-RISK PREGNANCY IN SECOND TRIMESTER: ICD-10-CM

## 2025-04-01 LAB
DEPRECATED RDW RBC AUTO: 47.4 FL (ref 37–54)
ERYTHROCYTE [DISTWIDTH] IN BLOOD BY AUTOMATED COUNT: 13.9 % (ref 12.3–15.4)
GLUCOSE UR STRIP-MCNC: NEGATIVE MG/DL
HCT VFR BLD AUTO: 33.6 % (ref 34–46.6)
HGB BLD-MCNC: 10.9 G/DL (ref 12–15.9)
MCH RBC QN AUTO: 29.8 PG (ref 26.6–33)
MCHC RBC AUTO-ENTMCNC: 32.4 G/DL (ref 31.5–35.7)
MCV RBC AUTO: 91.8 FL (ref 79–97)
PLATELET # BLD AUTO: 296 10*3/MM3 (ref 140–450)
PMV BLD AUTO: 9.7 FL (ref 6–12)
PROT UR STRIP-MCNC: NEGATIVE MG/DL
RBC # BLD AUTO: 3.66 10*6/MM3 (ref 3.77–5.28)
RPR SER QL: NORMAL
WBC NRBC COR # BLD AUTO: 10.9 10*3/MM3 (ref 3.4–10.8)

## 2025-04-01 PROCEDURE — 85027 COMPLETE CBC AUTOMATED: CPT

## 2025-04-01 PROCEDURE — 36415 COLL VENOUS BLD VENIPUNCTURE: CPT

## 2025-04-01 PROCEDURE — 86592 SYPHILIS TEST NON-TREP QUAL: CPT

## 2025-04-01 NOTE — PROGRESS NOTES
Chief Complaint   Patient presents with    Routine Prenatal Visit     No c/c     Soledad Quigley is a 24 y.o.  at 28w1d who presents for follow up prenatal care. Overall, she is feeling well. Some vaginal dryness, especially during intercourse. She denies vaginal bleeding, leakage of fluid or contractions. Baby is moving. Reports blood sugars going well -- wears Dexcom and insulin pump. Some varicose veins in her leg -- feels like this is getting worse, but had in prior pregnancy.     Pregnancy is complicated by:  - T1DM - insulin pump and Dexcom, follows with endocrinology   - History of 1 prior  section     /86   Wt 111 kg (243 lb 12.8 oz)   LMP 2024   BMI 41.85 kg/m²    General: No acute distress, sitting comfortably   CV: Regular heart rate  Lungs: Breathing unlabored  Abdomen: Soft, nontender, gravid,S=D   Pelvic: deferred      ASSESSMENT/PLAN    Routine prenatal care   - Pap ASCUS HPV neg  - will repeat postpartum   - Dating by ultrasound at 9 weeks gestation   - Prenatal labs: normal except UTI in early pregnancy (CURT neg)   - Testing for GC / Chlamydia / trichomonas: neg  - Genetic testing: low risk   - 20 weeks: Prenatal anatomy ultrasound at [facility] normal with normal echo. Has plans for repeat in 4 weeks   - 28 weeks: Tdap today, H/H, RPR testing - ordered    - 36 weeks: GBS testing   - Continue prenatal vitamins and aspirin   - UTD on flu shot     2. T1DM    - Follows with endocrinology and PDC    - Normal baseline preE labs, taking     - baseline EKG with borderline LVH -- will plan to repeat at 32 weeks    - Fetal echo normal    - Serial growth US q 4 weeks - with PDC    -  testing at 32 weeks    - Delivery 38-39 weeks pending glucose control     3. History of  section    - Desires repeat C/S. Will plan to schedule at 38-39 weeks.     Follow up in 2 weeks.     This note was electronically signed.    Tonya Ren MD  Obstetrics and Gynecology  [facility]

## 2025-04-02 ENCOUNTER — PATIENT OUTREACH (OUTPATIENT)
Dept: LABOR AND DELIVERY | Facility: HOSPITAL | Age: 25
End: 2025-04-02
Payer: MEDICAID

## 2025-04-02 NOTE — OUTREACH NOTE
[facility]  Check-In    Current Estimated Gestational Age: 28w2d      Questions/Answers      Flowsheet Row Responses   Best Method for Contacting Cell   Demographics Reviewed Yes   Currently Employed No   Able to keep appointments as scheduled Yes   Gender(s) and Name(s) Boy   Baby Active/Feeling Fetal Movemen Yes   How are you presently feeling? Good   Are you having any of the following symptoms? Pain  [occasional vaginal pain.]   Questions regarding prenatal visits or tests to be ordered? No   New Diagnosis Anemia   Equipment presently used at home: Other   Other Equipment: Dexcom, insulin pump   Education related to new diagnoses/home equipment No   Supplies ready for baby Car Seat, Crib   Resource/Environmental Concerns None   Do you have any questions related to your care experience, your pregnancy, plans for delivery, any concerns, etc? No   Other Education Birth Plan, Insurance benefits/Incentives, WIC Benefits, Other   Other Education Comment fetal kick counts, round ligament pain            Feeling well and reports good fetal movement. States blood sugars have been well controlled. Has had some vaginal pain discussed the possibility of round ligament pain. She also states she has varicose veins in her leg she was wondering if it had extended. Encouraged her to discuss with Dr. Ren so she can visualize any possible varicosities at her next appointment. VU. Denies any concerning symptoms but discussed what to watch out for.     Discussed that she still has time for childbirth education if she is interested. Discussed breastfeeding video and outpatient lactation clinic support as well as WIC support. States she was just thinking about how she needs to enroll in WIC. She has begun gathering items she will need for baby.    Updated resources provided via [employer] message. Contact information provided. Encouraged to call with questions, concerns, or for support. Will plan f/u around 35 weeks to ensure  she has everything she needs in place and feels ready for delivery.    MAGDA Hamilton RN  Maternity Nurse Navigator    4/2/2025, 14:45 EDT

## 2025-04-08 ENCOUNTER — HOSPITAL ENCOUNTER (OUTPATIENT)
Dept: WOMENS IMAGING | Facility: HOSPITAL | Age: 25
Discharge: HOME | End: 2025-04-08
Admitting: STUDENT IN AN ORGANIZED HEALTH CARE EDUCATION/TRAINING PROGRAM
Payer: MEDICAID

## 2025-04-08 ENCOUNTER — OFFICE VISIT (OUTPATIENT)
Dept: OBSTETRICS AND GYNECOLOGY | Facility: HOSPITAL | Age: 25
End: 2025-04-08
Payer: MEDICAID

## 2025-04-08 VITALS — BODY MASS INDEX: 41.71 KG/M2 | WEIGHT: 243 LBS | SYSTOLIC BLOOD PRESSURE: 121 MMHG | DIASTOLIC BLOOD PRESSURE: 67 MMHG

## 2025-04-08 DIAGNOSIS — E66.01 MORBID OBESITY WITH BMI OF 40.0-44.9, ADULT: ICD-10-CM

## 2025-04-08 DIAGNOSIS — O34.219 PREVIOUS CESAREAN DELIVERY AFFECTING PREGNANCY: ICD-10-CM

## 2025-04-08 DIAGNOSIS — O24.013 PRE-EXISTING TYPE 1 DIABETES MELLITUS DURING PREGNANCY IN THIRD TRIMESTER: Primary | ICD-10-CM

## 2025-04-08 PROCEDURE — 76816 OB US FOLLOW-UP PER FETUS: CPT

## 2025-04-08 PROCEDURE — 76819 FETAL BIOPHYS PROFIL W/O NST: CPT

## 2025-04-08 NOTE — ASSESSMENT & PLAN NOTE
Patient returns today for follow-up for pregnancy complicated by type 1 diabetes.  Patient's diabetes is being managed by Dr. Onofre and endocrinology.  Patient reports that her sugars are doing very well recently.  Patient notes no complications since her last visit and notes good fetal movement.    Ultrasound today demonstrates a normally grown fetus with no abnormality seen.  Amniotic fluid volume and umbilical artery Dopplers are normal.  Abdominal circumference is slightly greater in the 50th percentile for dates.    Patient's diabetes appears to be under good control as evidenced by normal growth of her fetus today.  We know no abnormality seen.  We would not suggest any alterations in her current care.  We would recommend  testing beginning at 32 weeks gestation.  Will rescan the patient again in 4 weeks time to assess fetal growth and wellbeing.  We would recommend delivery if no other complications arise at 37 to 38 weeks gestation.    Patient was counseled extensively regarding fetal movement will contact her provider immediately if she notices any decreased fetal movement.

## 2025-04-08 NOTE — PROGRESS NOTES
"Documentation of the ultrasound findings, images, and interpretations will be available in the patient's Viewpoint report which is located in the imaging tab in chart review.    Maternal/Fetal Medicine Follow Up  Note     Name: Soledad Quigley    : 2000     MRN: [id number]     Referring Provider: Tonya Ren MD    Chief Complaint  T1DM; MO; Hx c/s    Subjective     History of Present Illness:  Soledad Quigley is a 24 y.o.  29w1d who presents today for diabetes    BERLIN: Estimated Date of Delivery: 25     ROS:   As noted in HPI.     Objective     Vital Signs  /67   Wt 110 kg (243 lb)   LMP 2024   Estimated body mass index is 41.71 kg/m² as calculated from the following:    Height as of 3/3/25: 162.6 cm (64\").    Weight as of this encounter: 110 kg (243 lb).    Physical Exam    Ultrasound Impression:   See Viewpoint    Assessment and Plan     Soledad Quigley is a 24 y.o.  29w1d who presents today for diabetes    Diagnoses and all orders for this visit:    1. Pre-existing type 1 diabetes mellitus during pregnancy in third trimester (Primary)  Assessment & Plan:  Patient returns today for follow-up for pregnancy complicated by type 1 diabetes.  Patient's diabetes is being managed by Dr. Onofre and endocrinology.  Patient reports that her sugars are doing very well recently.  Patient notes no complications since her last visit and notes good fetal movement.    Ultrasound today demonstrates a normally grown fetus with no abnormality seen.  Amniotic fluid volume and umbilical artery Dopplers are normal.  Abdominal circumference is slightly greater in the 50th percentile for dates.    Patient's diabetes appears to be under good control as evidenced by normal growth of her fetus today.  We know no abnormality seen.  We would not suggest any alterations in her current care.  We would recommend  testing beginning at 32 weeks gestation.  Will rescan the patient " again in 4 weeks time to assess fetal growth and wellbeing.  We would recommend delivery if no other complications arise at 37 to 38 weeks gestation.    Patient was counseled extensively regarding fetal movement will contact her provider immediately if she notices any decreased fetal movement.    Orders:  -     [facility]    2. Previous C/S x 1  -     [facility]    3. Morbid obesity with BMI of 40.0-44.9, adult  -     [facility]         Follow Up  Return in about 4 weeks (around 2025).    I spent 15 minutes caring for the patient on the day of service. This included: obtaining or reviewing a separately obtained medical history, reviewing patient records, performing a medically appropriate exam and/or evaluation, counseling or educating the patient/family/caregiver, ordering medications, labs, and/or procedures and documenting such in the medical record. This does not include time spent on review and interpretation of other tests such as fetal ultrasound or the performance of other procedures such as amniocentesis or CVS.      Douglas A. Milligan, MD  Maternal Fetal Medicine, [facility]    Diagnostic Center     2025

## 2025-04-08 NOTE — PROGRESS NOTES
Patient denies any leaking fluid, vaginal bleeding, or contractions.  NIPT negative.  Patient reports next follow-up appointment with Dr. Ren' office is 4/15.

## 2025-04-08 NOTE — LETTER
"2025     Tonya Ren MD  [address]  [address]  [place] [address]    Patient: Soledad Quigley   YOB: 2000   Date of Visit: 2025     Dear Tonya Ren MD:       Thank you for referring Soledad Quigley to me for evaluation. Below are the relevant portions of my assessment and plan of care.    If you have questions, please do not hesitate to call me. I look forward to following Soledad along with you.         Sincerely,        Douglas A. Milligan, MD        CC: No Recipients    Milligan, Douglas A, MD  [phone]  Sign when Signing Visit  Documentation of the ultrasound findings, images, and interpretations will be available in the patient's Viewpoint report which is located in the imaging tab in chart review.    Maternal/Fetal Medicine Follow Up  Note     Name: Soledad Quigley    : 2000     MRN: [id number]     Referring Provider: Tonya Ren MD    Chief Complaint  T1DM; MO; Hx c/s    Subjective     History of Present Illness:  Soledad Quigley is a 24 y.o.  29w1d who presents today for diabetes    BERLIN: Estimated Date of Delivery: 25     ROS:   As noted in HPI.     Objective     Vital Signs  /67   Wt 110 kg (243 lb)   LMP 2024   Estimated body mass index is 41.71 kg/m² as calculated from the following:    Height as of 3/3/25: 162.6 cm (64\").    Weight as of this encounter: 110 kg (243 lb).    Physical Exam    Ultrasound Impression:   See Viewpoint    Assessment and Plan     Soledad Quigley is a 24 y.o.  29w1d who presents today for diabetes    Diagnoses and all orders for this visit:    1. Pre-existing type 1 diabetes mellitus during pregnancy in third trimester (Primary)  Assessment & Plan:  Patient returns today for follow-up for pregnancy complicated by type 1 diabetes.  Patient's diabetes is being managed by Dr. Onofre and endocrinology.  Patient reports that her sugars are doing very well recently.  Patient notes " no complications since her last visit and notes good fetal movement.    Ultrasound today demonstrates a normally grown fetus with no abnormality seen.  Amniotic fluid volume and umbilical artery Dopplers are normal.  Abdominal circumference is slightly greater in the 50th percentile for dates.    Patient's diabetes appears to be under good control as evidenced by normal growth of her fetus today.  We know no abnormality seen.  We would not suggest any alterations in her current care.  We would recommend  testing beginning at 32 weeks gestation.  Will rescan the patient again in 4 weeks time to assess fetal growth and wellbeing.  We would recommend delivery if no other complications arise at 37 to 38 weeks gestation.    Patient was counseled extensively regarding fetal movement will contact her provider immediately if she notices any decreased fetal movement.    Orders:  -     [facility]; [facility]    2. Previous C/S x 1  -     [facility]    3. Morbid obesity with BMI of 40.0-44.9, adult  -     [facility]; Future         Follow Up  Return in about 4 weeks (around 2025).    I spent 15 minutes caring for the patient on the day of service. This included: obtaining or reviewing a separately obtained medical history, reviewing patient records, performing a medically appropriate exam and/or evaluation, counseling or educating the patient/family/caregiver, ordering medications, labs, and/or procedures and documenting such in the medical record. This does not include time spent on review and interpretation of other tests such as fetal ultrasound or the performance of other procedures such as amniocentesis or CVS.      Douglas A. Milligan, MD  Maternal Fetal Medicine, [facility]    Diagnostic Center     2025

## 2025-04-15 ENCOUNTER — ROUTINE PRENATAL (OUTPATIENT)
Dept: OBSTETRICS AND GYNECOLOGY | Facility: CLINIC | Age: 25
End: 2025-04-15
Payer: MEDICAID

## 2025-04-15 VITALS — SYSTOLIC BLOOD PRESSURE: 136 MMHG | DIASTOLIC BLOOD PRESSURE: 86 MMHG | BODY MASS INDEX: 42.23 KG/M2 | WEIGHT: 246 LBS

## 2025-04-15 DIAGNOSIS — O99.210 OBESITY IN PREGNANCY, ANTEPARTUM: ICD-10-CM

## 2025-04-15 DIAGNOSIS — O34.219 PREVIOUS CESAREAN DELIVERY AFFECTING PREGNANCY: ICD-10-CM

## 2025-04-15 DIAGNOSIS — E10.9 TYPE 1 DIABETES MELLITUS WITHOUT COMPLICATION: ICD-10-CM

## 2025-04-15 DIAGNOSIS — O09.93 HIGH-RISK PREGNANCY IN THIRD TRIMESTER: Primary | ICD-10-CM

## 2025-04-15 LAB
GLUCOSE UR STRIP-MCNC: NEGATIVE MG/DL
PROT UR STRIP-MCNC: NEGATIVE MG/DL

## 2025-04-15 NOTE — PROGRESS NOTES
Chief Complaint   Patient presents with    Routine Prenatal Visit     No c/c     Soledad Quigley is a 24 y.o.  at 30w1d who presents for follow up prenatal care. Overall, she is feeling well. Some vaginal dryness, not really improved with [place] jelly. She denies vaginal bleeding, leakage of fluid or contractions. Baby is moving. Reports blood sugars going well -- wears Dexcom and insulin pump. Some varicose veins in her leg, not hurting as bad.     Pregnancy is complicated by:  - T1DM - insulin pump and Dexcom, follows with endocrinology   - History of 1 prior  section     /86   Wt 112 kg (246 lb)   LMP 2024   BMI 42.23 kg/m²    General: No acute distress, sitting comfortably   CV: Regular heart rate  Lungs: Breathing unlabored  Abdomen: Soft, nontender, gravid,S=D   Pelvic: deferred      ASSESSMENT/PLAN    Routine prenatal care   - Pap ASCUS HPV neg  - will repeat postpartum   - Dating by ultrasound at 9 weeks gestation   - Prenatal labs: normal except UTI in early pregnancy (CURT neg)   - Testing for GC / Chlamydia / trichomonas: neg  - Genetic testing: low risk   - 20 weeks: Prenatal anatomy ultrasound at [facility] normal with normal echo. Following with growth scans every 4 weeks   - 28 weeks: Tdap 4/1, H/H, RPR testing - normal  - 36 weeks: GBS testing   - Continue prenatal vitamins and aspirin   - UTD on flu shot     2. T1DM    - Follows with endocrinology and PDC    - Normal baseline preE labs, taking     - baseline EKG with borderline LVH -- will plan to repeat at 32 weeks    - Fetal echo normal    - Serial growth US q 4 weeks - with PDC    -  testing at 32 weeks - next visit    - Delivery 37-38 weeks pending glucose control per MFM     3. History of  section       Desires sterilization    - Desires repeat C/S. Will plan to schedule at 37-38 weeks.    - Sterilization consent forms signed today     Follow up in 2 weeks.     This note was electronically  signed.    Tonya Ren MD  Obstetrics and Gynecology  [facility]

## 2025-04-22 ENCOUNTER — PATIENT MESSAGE (OUTPATIENT)
Dept: ENDOCRINOLOGY | Facility: CLINIC | Age: 25
End: 2025-04-22
Payer: MEDICAID

## 2025-04-24 RX ORDER — INSULIN LISPRO 100 [IU]/ML
INJECTION, SOLUTION INTRAVENOUS; SUBCUTANEOUS
Qty: 90 ML | Refills: 3 | Status: CANCELLED | OUTPATIENT
Start: 2025-04-24

## 2025-04-25 ENCOUNTER — HOSPITAL ENCOUNTER (OUTPATIENT)
Dept: CARDIOLOGY | Facility: HOSPITAL | Age: 25
Discharge: HOME | End: 2025-04-25
Payer: MEDICAID

## 2025-04-25 DIAGNOSIS — O24.012 PRE-EXISTING TYPE 1 DIABETES MELLITUS DURING PREGNANCY IN SECOND TRIMESTER: ICD-10-CM

## 2025-04-25 LAB
QT INTERVAL: 390 MS
QTC INTERVAL: 458 MS

## 2025-04-25 PROCEDURE — 93005 ELECTROCARDIOGRAM TRACING: CPT | Performed by: STUDENT IN AN ORGANIZED HEALTH CARE EDUCATION/TRAINING PROGRAM

## 2025-04-25 RX ORDER — INSULIN LISPRO 100 [IU]/ML
INJECTION, SOLUTION INTRAVENOUS; SUBCUTANEOUS
Qty: 40 ML | Refills: 5 | Status: SHIPPED | OUTPATIENT
Start: 2025-04-25

## 2025-04-29 ENCOUNTER — ROUTINE PRENATAL (OUTPATIENT)
Dept: OBSTETRICS AND GYNECOLOGY | Facility: CLINIC | Age: 25
End: 2025-04-29
Payer: MEDICAID

## 2025-04-29 VITALS — WEIGHT: 245 LBS | SYSTOLIC BLOOD PRESSURE: 126 MMHG | DIASTOLIC BLOOD PRESSURE: 84 MMHG | BODY MASS INDEX: 42.05 KG/M2

## 2025-04-29 DIAGNOSIS — O34.219 PREVIOUS CESAREAN DELIVERY AFFECTING PREGNANCY: ICD-10-CM

## 2025-04-29 DIAGNOSIS — O24.013 PRE-EXISTING TYPE 1 DIABETES MELLITUS DURING PREGNANCY IN THIRD TRIMESTER: ICD-10-CM

## 2025-04-29 DIAGNOSIS — O09.93 HIGH-RISK PREGNANCY IN THIRD TRIMESTER: Primary | ICD-10-CM

## 2025-04-29 LAB
GLUCOSE UR STRIP-MCNC: NEGATIVE MG/DL
LEUKOCYTE EST, POC: ABNORMAL
NITRITE UR-MCNC: NEGATIVE MG/ML
PROT UR STRIP-MCNC: ABNORMAL MG/DL

## 2025-04-29 NOTE — PROGRESS NOTES
Chief Complaint   Patient presents with    Routine Prenatal Visit     No c/c     Soledad Quigley is a 24 y.o.  at 32w1d who presents for follow up prenatal care. Overall, she is feeling well. Vaginal dryness has improved. She denies vaginal bleeding, leakage of fluid or contractions. Baby is moving. Reports blood sugars going well -- wears Dexcom and insulin pump. Some leg cramping at night.     Pregnancy is complicated by:  - T1DM - insulin pump and Dexcom, follows with endocrinology   - History of 1 prior  section     /84   Wt 111 kg (245 lb)   LMP 2024   BMI 42.05 kg/m²    General: No acute distress, sitting comfortably   CV: Regular heart rate  Lungs: Breathing unlabored  Abdomen: Soft, nontender, gravid,S=D   Pelvic: deferred      ASSESSMENT/PLAN    Routine prenatal care   - Pap ASCUS HPV neg  - will repeat postpartum   - Dating by ultrasound at 9 weeks gestation   - Prenatal labs: normal except UTI in early pregnancy (CUTR neg)   - Testing for GC / Chlamydia / trichomonas: neg  - Genetic testing: low risk   - 20 weeks: Prenatal anatomy ultrasound at [facility] normal with normal echo. Following with growth scans every 4 weeks; next appt 25  - 28 weeks: Tdap 4/, H/H, RPR testing - normal  - 36 weeks: GBS testing   - Continue prenatal vitamins and aspirin   - UTD on flu shot     2. T1DM    - Follows with endocrinology and PDC -- sees endocrine tomorrow    - Normal baseline preE labs, taking     - baseline EKG with borderline LVH -- repeat EKG normal    - Fetal echo normal    - Serial growth US q 4 weeks - with PDC    -  testing at 32 weeks - NST today    - Delivery 37-38 weeks pending glucose control per MFM     3. History of  section       Desires sterilization    - Desires repeat C/S. Will plan to schedule at 37-38 weeks.    - Sterilization consent forms signed    NST Read  Reason for test: T1DM   Time frame:   Baseline 140 bpm, moderate  variability, + accelerations, one deceleration with reassuring and reactive tracing x 20 minutes afterwards   Reactive NST     Follow up for twice weekly  testing.     This note was electronically signed.    Tonya Ren MD  Obstetrics and Gynecology  [facility]

## 2025-04-30 ENCOUNTER — OFFICE VISIT (OUTPATIENT)
Dept: ENDOCRINOLOGY | Facility: CLINIC | Age: 25
End: 2025-04-30
Payer: MEDICAID

## 2025-04-30 VITALS
DIASTOLIC BLOOD PRESSURE: 66 MMHG | HEIGHT: 64 IN | SYSTOLIC BLOOD PRESSURE: 118 MMHG | HEART RATE: 86 BPM | OXYGEN SATURATION: 97 % | BODY MASS INDEX: 41.93 KG/M2 | WEIGHT: 245.6 LBS

## 2025-04-30 DIAGNOSIS — O24.012 PRE-EXISTING TYPE 1 DIABETES MELLITUS DURING PREGNANCY IN SECOND TRIMESTER: Primary | ICD-10-CM

## 2025-04-30 LAB
EXPIRATION DATE: ABNORMAL
EXPIRATION DATE: NORMAL
GLUCOSE BLDC GLUCOMTR-MCNC: 111 MG/DL (ref 70–130)
HBA1C MFR BLD: 5.9 % (ref 4.5–5.7)
Lab: ABNORMAL
Lab: NORMAL

## 2025-04-30 NOTE — ASSESSMENT & PLAN NOTE
Diabetes is stable.   Continue current treatment regimen.    Review of tandem pump report shows generally good glucose control. Changes were made yesterday to strengthen correction factor and carb ratio. Still sometimes having to add correction for postprandial hyperglycemia so may need further adjustment. She will continue to send weekly reports until delivery. Reassurance that she is doing well and no need to change to pump to manual mode.    When close to delivery, will give patient instructions regarding pump setting adjustments for after delivery.    Encouraged patient to increase exercise and continue focusing on keeping carb amounts for meals at less than 65-70 g. Keep working on eating more at home.     -Glycemic Targets during pregnancy were reviewed, as follows: fasting glucose <95, 1 hour post prandial <140, 2 hour postprandial <120.    -Patient will return for follow up as scheduled for post partum visit in June with Dr. Christianson. She was instructed to contact the office in the interim if glucoses not at target.

## 2025-04-30 NOTE — PROGRESS NOTES
Office Note      Date: 2025  Patient Name: Soledad Quigley  MRN: [id number]  : 2000    Chief Complaint   Patient presents with    Diabetes     Pre-existing type 1 diabetes mellitus during pregnancy in second trimester       History of Present Illness:   Soledad Quigley is a 24 y.o. female who presents for Diabetes type 1.   Diagnosed:   Current RX: Tandem mobi pump    Patient had gestational diabetes in , managed with diet and then returned to normal after delivery. Admitted with DKA in  and found to have positive pancreatic antibodies, dx changed to Type 1.     Currently pregnant, 32 weeks  BERLIN 2025, having a boy.  Planned , not sure of date yet.  Did see high risk OB and they have deferred DM management to Endo. Measurements have been appropriate. Baby is very active.     Patient presents for follow-up of gestational diabetes.  She was last seen 3/3/25  and reports since that time had improvement in glucose control but still having to add correction after meals. Last week CF reduced from 30 to 20 and 2 days ago lowered to 15. Carb ratio lowered to 3 g/unit yesterday. No frequent hypoglycemia. Overnight glucose levels have improved and waking up with numbers in the 70s now.     At last visit we dicussed  the importance of regular physical activity and diet changes to help control her BG along with insulin .      Physical activity: needs to increase physical activity; she is active when working on Thursday and         Last A1c:  Hemoglobin A1C   Date Value Ref Range Status   2025 5.9 (A) 4.5 - 5.7 % Final   2024 7.00 (H) 4.80 - 5.60 % Final       Changes in health since last visit: none.       Subjective          Review of Systems:   Review of Systems   Constitutional:  Negative for activity change, appetite change and fatigue.   Gastrointestinal:  Negative for abdominal pain and nausea.   Musculoskeletal:  Negative for myalgias.  "  Psychiatric/Behavioral:  The patient is not nervous/anxious.        The following portions of the patient's history were reviewed and updated as appropriate: allergies, current medications, past family history, past medical history, past social history, past surgical history, and problem list.    Objective     Visit Vitals  /66 (BP Location: Left arm, Patient Position: Sitting, Cuff Size: Adult)   Pulse 86   Ht 162.6 cm (64\")   Wt 111 kg (245 lb 9.6 oz)   LMP 09/06/2024   SpO2 97%   BMI 42.16 kg/m²           Physical Exam:  Physical Exam  Constitutional:       Appearance: She is well-developed.   HENT:      Head: Normocephalic and atraumatic.      Right Ear: External ear normal.      Left Ear: External ear normal.      Nose: Nose normal.   Eyes:      Conjunctiva/sclera: Conjunctivae normal.   Cardiovascular:      Rate and Rhythm: Normal rate.   Pulmonary:      Effort: Pulmonary effort is normal.   Musculoskeletal:         General: Normal range of motion.      Cervical back: Normal range of motion.   Neurological:      Mental Status: She is alert and oriented to person, place, and time.   Psychiatric:         Behavior: Behavior normal.         Thought Content: Thought content normal.         Judgment: Judgment normal.          Assessment / Plan      Assessment & Plan:  Diagnoses and all orders for this visit:    1. Pre-existing type 1 diabetes mellitus during pregnancy in second trimester (Primary)  Assessment & Plan:  Diabetes is stable.   Continue current treatment regimen.    Review of tandem pump report shows generally good glucose control. Changes were made yesterday to strengthen correction factor and carb ratio. Still sometimes having to add correction for postprandial hyperglycemia so may need further adjustment. She will continue to send weekly reports until delivery. Reassurance that she is doing well and no need to change to pump to manual mode.    When close to delivery, will give patient " instructions regarding pump setting adjustments for after delivery.    Encouraged patient to increase exercise and continue focusing on keeping carb amounts for meals at less than 65-70 g. Keep working on eating more at home.     -Glycemic Targets during pregnancy were reviewed, as follows: fasting glucose <95, 1 hour post prandial <140, 2 hour postprandial <120.    -Patient will return for follow up as scheduled for post partum visit in June with Dr. Christianson. She was instructed to contact the office in the interim if glucoses not at target.      Orders:  -     POC Glucose, Blood  -     POC Glycosylated Hemoglobin (Hb A1C)        Return for Next scheduled follow up.    Portions of this note were completed with voice recognition program.  Electronically signed by Michelle Sanchez PA-C  [facility]  04/30/2025

## 2025-05-02 ENCOUNTER — ROUTINE PRENATAL (OUTPATIENT)
Dept: OBSTETRICS AND GYNECOLOGY | Facility: CLINIC | Age: 25
End: 2025-05-02
Payer: MEDICAID

## 2025-05-02 VITALS — SYSTOLIC BLOOD PRESSURE: 120 MMHG | WEIGHT: 246.8 LBS | DIASTOLIC BLOOD PRESSURE: 82 MMHG | BODY MASS INDEX: 42.36 KG/M2

## 2025-05-02 DIAGNOSIS — O34.219 PREVIOUS CESAREAN DELIVERY AFFECTING PREGNANCY: ICD-10-CM

## 2025-05-02 DIAGNOSIS — O24.013 PRE-EXISTING TYPE 1 DIABETES MELLITUS DURING PREGNANCY IN THIRD TRIMESTER: ICD-10-CM

## 2025-05-02 DIAGNOSIS — O09.93 HIGH-RISK PREGNANCY IN THIRD TRIMESTER: Primary | ICD-10-CM

## 2025-05-02 LAB
GLUCOSE UR STRIP-MCNC: NEGATIVE MG/DL
PROT UR STRIP-MCNC: NEGATIVE MG/DL

## 2025-05-02 NOTE — PROGRESS NOTES
Chief Complaint   Patient presents with    Routine Prenatal Visit     BPP done today / no c/c     Soledad Quigley is a 24 y.o.  at 32w4d who presents for follow up prenatal care. Overall, she is feeling well. She denies vaginal bleeding, leakage of fluid or regular contractions. Baby is moving. Reports blood sugars going well -- wears Dexcom and insulin pump. Occasional cramping but very short lived.     Pregnancy is complicated by:  - T1DM - insulin pump and Dexcom, follows with endocrinology   - History of 1 prior  section     /82   Wt 112 kg (246 lb 12.8 oz)   LMP 2024   BMI 42.36 kg/m²    General: No acute distress, sitting comfortably   CV: Regular heart rate  Lungs: Breathing unlabored  Abdomen: Soft, nontender, gravid,S=D   Pelvic: deferred      ASSESSMENT/PLAN    Routine prenatal care   - Pap ASCUS HPV neg  - will repeat postpartum   - Dating by ultrasound at 9 weeks gestation   - Prenatal labs: normal except UTI in early pregnancy (CURT neg)   - Testing for GC / Chlamydia / trichomonas: neg  - Genetic testing: low risk   - 20 weeks: Prenatal anatomy ultrasound at [facility] normal with normal echo. Following with growth scans every 4 weeks; next appt 25  - 28 weeks: Tdap 4/, H/H, RPR testing - normal  - 36 weeks: GBS testing   - Continue prenatal vitamins and aspirin   - UTD on flu shot     2. T1DM    - Follows with endocrinology and PDC -- sees endocrine tomorrow    - Normal baseline preE labs, taking     - baseline EKG with borderline LVH -- repeat EKG normal    - Fetal echo normal    - Serial growth US q 4 weeks - with PDC    -  testing at 32 weeks - BPP  milagro    - Delivery 37-38 weeks pending glucose control per MFM     - Will schedule at 38 weeks     3. History of  section       Desires sterilization    - Desires repeat C/S. Will plan to schedule at 38 weeks.    - Sterilization consent forms signed    Follow up for twice weekly   testing.     This note was electronically signed.    Tonya Ren MD  Obstetrics and Gynecology  [facility]

## 2025-05-07 ENCOUNTER — ROUTINE PRENATAL (OUTPATIENT)
Dept: OBSTETRICS AND GYNECOLOGY | Facility: CLINIC | Age: 25
End: 2025-05-07
Payer: MEDICAID

## 2025-05-07 VITALS — SYSTOLIC BLOOD PRESSURE: 122 MMHG | DIASTOLIC BLOOD PRESSURE: 82 MMHG | WEIGHT: 246.2 LBS | BODY MASS INDEX: 42.26 KG/M2

## 2025-05-07 DIAGNOSIS — O09.93 HIGH-RISK PREGNANCY IN THIRD TRIMESTER: Primary | ICD-10-CM

## 2025-05-07 DIAGNOSIS — O34.219 PREVIOUS CESAREAN DELIVERY AFFECTING PREGNANCY: ICD-10-CM

## 2025-05-07 DIAGNOSIS — O24.013 PRE-EXISTING TYPE 1 DIABETES MELLITUS DURING PREGNANCY IN THIRD TRIMESTER: ICD-10-CM

## 2025-05-07 LAB
GLUCOSE UR STRIP-MCNC: NEGATIVE MG/DL
PROT UR STRIP-MCNC: NEGATIVE MG/DL

## 2025-05-07 NOTE — PROGRESS NOTES
Chief Complaint   Patient presents with    Routine Prenatal Visit     Is taking 2 baby aspirin a day, and now cannot stop bleeding if she gets scratched. / NST started @ 10:15     Soledad Quigley is a 24 y.o.  at 33w2d who presents for follow up prenatal care. Overall, she is feeling well. She denies vaginal bleeding, leakage of fluid or regular contractions. Baby is moving. Reports blood sugars going well -- wears Dexcom and insulin pump. Had bleeding from her arm after getting scratched that did not stop for a while. Increased insulin with endocrinology recently.     Pregnancy is complicated by:  - T1DM - insulin pump and Dexcom, follows with endocrinology   - History of 1 prior  section     /82   Wt 112 kg (246 lb 3.2 oz)   LMP 2024   BMI 42.26 kg/m²    General: No acute distress, sitting comfortably   CV: Regular heart rate  Lungs: Breathing unlabored  Abdomen: Soft, nontender, gravid,S=D   Pelvic: deferred      ASSESSMENT/PLAN    Routine prenatal care   - Pap ASCUS HPV neg  - will repeat postpartum   - Dating by ultrasound at 9 weeks gestation   - Prenatal labs: normal except UTI in early pregnancy (CURT neg)   - Testing for GC / Chlamydia / trichomonas: neg  - Genetic testing: low risk   - 20 weeks: Prenatal anatomy ultrasound at [facility] normal with normal echo. Following with growth scans every 4 weeks; next appt 25  - 28 weeks: Tdap 4/1, H/H, RPR testing - normal  - 36 weeks: GBS testing   - Continue prenatal vitamins and aspirin   - UTD on flu shot     2. T1DM    - Follows with endocrinology and PDC     - Normal baseline preE labs, taking     - baseline EKG with borderline LVH -- repeat EKG normal    - Fetal echo normal    - Serial growth US q 4 weeks - with PDC    -  testing at 32 weeks     - Delivery 37-38 weeks pending glucose control per MFM     - Scheduled 25    3. History of  section       Desires sterilization    - Desires repeat  C/S + BTL -- scheduled    - Sterilization consent forms signed    NST Read  Reason for test: T1DM   Time frame: 2416-9240  Baseline 140 bpm, moderate variability, + accelerations, no decelerations  Reactive NST     Follow up for twice weekly  testing.     This note was electronically signed.    Tonya Ren MD  Obstetrics and Gynecology  [facility]

## 2025-05-09 ENCOUNTER — ROUTINE PRENATAL (OUTPATIENT)
Dept: OBSTETRICS AND GYNECOLOGY | Facility: CLINIC | Age: 25
End: 2025-05-09
Payer: MEDICAID

## 2025-05-09 ENCOUNTER — PATIENT MESSAGE (OUTPATIENT)
Dept: ENDOCRINOLOGY | Facility: CLINIC | Age: 25
End: 2025-05-09
Payer: MEDICAID

## 2025-05-09 VITALS — DIASTOLIC BLOOD PRESSURE: 82 MMHG | WEIGHT: 248.6 LBS | SYSTOLIC BLOOD PRESSURE: 124 MMHG | BODY MASS INDEX: 42.67 KG/M2

## 2025-05-09 DIAGNOSIS — O09.93 HIGH-RISK PREGNANCY IN THIRD TRIMESTER: Primary | ICD-10-CM

## 2025-05-09 DIAGNOSIS — O24.013 PRE-EXISTING TYPE 1 DIABETES MELLITUS DURING PREGNANCY IN THIRD TRIMESTER: ICD-10-CM

## 2025-05-09 DIAGNOSIS — O34.219 PREVIOUS CESAREAN DELIVERY AFFECTING PREGNANCY: ICD-10-CM

## 2025-05-09 LAB
GLUCOSE UR STRIP-MCNC: NEGATIVE MG/DL
LEUKOCYTE EST, POC: NEGATIVE
NITRITE UR-MCNC: NEGATIVE MG/ML
PROT UR STRIP-MCNC: ABNORMAL MG/DL

## 2025-05-09 NOTE — PROGRESS NOTES
Chief Complaint   Patient presents with    Routine Prenatal Visit     US done today / no c/c     Soledad Quigley is a 24 y.o.  at 33w4d who presents for follow up prenatal care. Overall, she is feeling well. She denies vaginal bleeding, leakage of fluid or regular contractions. Baby is moving. Fasting was very high this  -- told her endocrinologist and has corrected back to normal. Worried that her pump was not working overnight.     Pregnancy is complicated by:  - T1DM - insulin pump and Dexcom, follows with endocrinology   - History of 1 prior  section     /82   Wt 113 kg (248 lb 9.6 oz)   LMP 2024   BMI 42.67 kg/m²    General: No acute distress, sitting comfortably   CV: Regular heart rate  Lungs: Breathing unlabored  Abdomen: Soft, nontender, gravid,S=D   Pelvic: deferred      ASSESSMENT/PLAN    Routine prenatal care   - Pap ASCUS HPV neg  - will repeat postpartum   - Dating by ultrasound at 9 weeks gestation   - Prenatal labs: normal except UTI in early pregnancy (CURT neg)   - Testing for GC / Chlamydia / trichomonas: neg  - Genetic testing: low risk   - 20 weeks: Prenatal anatomy ultrasound at [facility] normal with normal echo. Following with growth scans every 4 weeks; next appt 25  - 28 weeks: Tdap 4/1, H/H, RPR testing - normal  - 36 weeks: GBS testing   - Continue prenatal vitamins and aspirin   - UTD on flu shot     2. T1DM    - Follows with endocrinology and PDC     - Normal baseline preE labs, taking     - baseline EKG with borderline LVH -- repeat EKG normal    - Fetal echo normal    - Serial growth US q 4 weeks - with PDC    -  testing at 32 weeks     - Delivery 37-38 weeks pending glucose control per MFM     - Scheduled 25    3. History of  section       Desires sterilization    - Desires repeat C/S + BTL -- scheduled    - Sterilization consent forms signed    BP  today. Counseled to call and come in to triage if BS  consistently high over the weekend.      Follow up for twice weekly  testing.     This note was electronically signed.    Tonya Ren MD  Obstetrics and Gynecology  [facility]

## 2025-05-12 ENCOUNTER — OFFICE VISIT (OUTPATIENT)
Dept: OBSTETRICS AND GYNECOLOGY | Facility: HOSPITAL | Age: 25
End: 2025-05-12
Payer: MEDICAID

## 2025-05-12 ENCOUNTER — HOSPITAL ENCOUNTER (OUTPATIENT)
Dept: WOMENS IMAGING | Facility: HOSPITAL | Age: 25
Discharge: HOME | End: 2025-05-12
Admitting: STUDENT IN AN ORGANIZED HEALTH CARE EDUCATION/TRAINING PROGRAM
Payer: MEDICAID

## 2025-05-12 VITALS — WEIGHT: 246.8 LBS | BODY MASS INDEX: 42.36 KG/M2 | DIASTOLIC BLOOD PRESSURE: 67 MMHG | SYSTOLIC BLOOD PRESSURE: 124 MMHG

## 2025-05-12 DIAGNOSIS — Z3A.34 34 WEEKS GESTATION OF PREGNANCY: ICD-10-CM

## 2025-05-12 DIAGNOSIS — O34.219 PREVIOUS CESAREAN DELIVERY AFFECTING PREGNANCY: ICD-10-CM

## 2025-05-12 DIAGNOSIS — E66.01 CLASS 3 SEVERE OBESITY DUE TO EXCESS CALORIES WITH SERIOUS COMORBIDITY AND BODY MASS INDEX (BMI) OF 40.0 TO 44.9 IN ADULT: Primary | ICD-10-CM

## 2025-05-12 DIAGNOSIS — O24.013 PRE-EXISTING TYPE 1 DIABETES MELLITUS DURING PREGNANCY IN THIRD TRIMESTER: ICD-10-CM

## 2025-05-12 DIAGNOSIS — E66.813 CLASS 3 SEVERE OBESITY DUE TO EXCESS CALORIES WITH SERIOUS COMORBIDITY AND BODY MASS INDEX (BMI) OF 40.0 TO 44.9 IN ADULT: Primary | ICD-10-CM

## 2025-05-12 PROCEDURE — 76816 OB US FOLLOW-UP PER FETUS: CPT

## 2025-05-12 PROCEDURE — 76819 FETAL BIOPHYS PROFIL W/O NST: CPT

## 2025-05-12 NOTE — PROGRESS NOTES
Patient denies any leaking of fluid or vaginal bleeding. She reports occasional cramping.   NIPT negative.  Patient reports next follow-up appointment with Dr. Ren' office is tomorrow.

## 2025-05-13 ENCOUNTER — ROUTINE PRENATAL (OUTPATIENT)
Dept: OBSTETRICS AND GYNECOLOGY | Facility: CLINIC | Age: 25
End: 2025-05-13
Payer: MEDICAID

## 2025-05-13 VITALS — DIASTOLIC BLOOD PRESSURE: 82 MMHG | WEIGHT: 247.2 LBS | SYSTOLIC BLOOD PRESSURE: 120 MMHG | BODY MASS INDEX: 42.43 KG/M2

## 2025-05-13 DIAGNOSIS — O24.013 PRE-EXISTING TYPE 1 DIABETES MELLITUS DURING PREGNANCY IN THIRD TRIMESTER: ICD-10-CM

## 2025-05-13 DIAGNOSIS — O24.012 PRE-EXISTING TYPE 1 DIABETES MELLITUS DURING PREGNANCY IN SECOND TRIMESTER: Primary | ICD-10-CM

## 2025-05-13 DIAGNOSIS — O09.93 HIGH-RISK PREGNANCY IN THIRD TRIMESTER: ICD-10-CM

## 2025-05-13 LAB
GLUCOSE UR STRIP-MCNC: NEGATIVE MG/DL
PROT UR STRIP-MCNC: NEGATIVE MG/DL

## 2025-05-13 NOTE — PROGRESS NOTES
Chief Complaint   Patient presents with    Routine Prenatal Visit     NST started @ 9:02 / no c/c     Soledad Quigley is a 24 y.o.  at 34w1d who presents for follow up prenatal care. Overall, she is feeling well. She denies vaginal bleeding, leakage of fluid or regular contractions. Baby is moving. No more high blood sugars -- had to move pump location.       Pregnancy is complicated by:  - T1DM - insulin pump and Dexcom, follows with endocrinology   - History of 1 prior  section     /82   Wt 112 kg (247 lb 3.2 oz)   LMP 2024   BMI 42.43 kg/m²    General: No acute distress, sitting comfortably   CV: Regular heart rate  Lungs: Breathing unlabored  Abdomen: Soft, nontender, gravid,S=D   Pelvic: deferred      ASSESSMENT/PLAN    Routine prenatal care   - Pap ASCUS HPV neg  - will repeat postpartum   - Dating by ultrasound at 9 weeks gestation   - Prenatal labs: normal except UTI in early pregnancy (CURT neg)   - Testing for GC / Chlamydia / trichomonas: neg  - Genetic testing: low risk   - 20 weeks: Prenatal anatomy ultrasound at [facility] normal with normal echo. Following with growth scans every 4 weeks  - 28 weeks: Tdap 4/1, H/H, RPR testing - normal  - 36 weeks: GBS testing   - Continue prenatal vitamins and aspirin   - UTD on flu shot     2. T1DM    - Follows with endocrinology and PDC     - Normal baseline preE labs, taking     - baseline EKG with borderline LVH -- repeat EKG normal    - Fetal echo normal    - Serial growth US q 4 weeks - with PDC    -  testing at 32 weeks     - Delivery 38-39 weeks pending glucose control per MFM     - Scheduled 25 (38w3d)    3. History of  section       Desires sterilization    - Desires repeat C/S + BTL -- scheduled    - Sterilization consent forms signed    NST Read  Reason for test: T1DM  Time frame: 3529-9491  Baseline 130 bpm, moderate variability, + accelerations, no decelerations  Reactive NST      Follow up  for twice weekly  testing.     This note was electronically signed.    Tonya Ren MD  Obstetrics and Gynecology  [facility]

## 2025-05-16 ENCOUNTER — ROUTINE PRENATAL (OUTPATIENT)
Dept: OBSTETRICS AND GYNECOLOGY | Facility: CLINIC | Age: 25
End: 2025-05-16
Payer: MEDICAID

## 2025-05-16 VITALS — WEIGHT: 254 LBS | BODY MASS INDEX: 43.6 KG/M2 | DIASTOLIC BLOOD PRESSURE: 84 MMHG | SYSTOLIC BLOOD PRESSURE: 130 MMHG

## 2025-05-16 DIAGNOSIS — O09.93 HIGH-RISK PREGNANCY IN THIRD TRIMESTER: Primary | ICD-10-CM

## 2025-05-16 DIAGNOSIS — O99.210 OBESITY IN PREGNANCY, ANTEPARTUM: ICD-10-CM

## 2025-05-16 DIAGNOSIS — O24.013 PRE-EXISTING TYPE 1 DIABETES MELLITUS DURING PREGNANCY IN THIRD TRIMESTER: ICD-10-CM

## 2025-05-16 DIAGNOSIS — O34.219 PREVIOUS CESAREAN DELIVERY AFFECTING PREGNANCY: ICD-10-CM

## 2025-05-16 LAB
GLUCOSE UR STRIP-MCNC: NEGATIVE MG/DL
GP B STREP RRNA SPEC QL PROBE: NORMAL
LEUKOCYTE EST, POC: NEGATIVE
NITRITE UR-MCNC: NEGATIVE MG/ML
PROT UR STRIP-MCNC: ABNORMAL MG/DL

## 2025-05-16 NOTE — PROGRESS NOTES
Chief Complaint   Patient presents with    Routine Prenatal Visit     BPP done today / no c/c     Soledad Quigley is a 24 y.o.  at 34w4d who presents for follow up prenatal care. Overall, she is feeling well. She denies vaginal bleeding, leakage of fluid or regular contractions. Baby is moving.  Blood sugars doing well.     Pregnancy is complicated by:  - T1DM - insulin pump and Dexcom, follows with endocrinology   - History of 1 prior  section     /84   Wt 115 kg (254 lb)   LMP 2024   BMI 43.60 kg/m²    General: No acute distress, sitting comfortably   CV: Regular heart rate  Lungs: Breathing unlabored  Abdomen: Soft, nontender, gravid,S=D   Pelvic: C/T/H      ASSESSMENT/PLAN    Routine prenatal care   - Pap ASCUS HPV neg  - will repeat postpartum   - Dating by ultrasound at 9 weeks gestation   - Prenatal labs: normal except UTI in early pregnancy (CURT neg)   - Testing for GC / Chlamydia / trichomonas: neg  - Genetic testing: low risk   - 20 weeks: Prenatal anatomy ultrasound at [facility] normal with normal echo. Following with growth scans every 4 weeks  - 28 weeks: Tdap 4/, H/H, RPR testing - normal  - GBS testing - collected today   - Continue prenatal vitamins and aspirin   - UTD on flu shot     2. T1DM    - Follows with endocrinology and PDC     - Normal baseline preE labs, taking     - baseline EKG with borderline LVH -- repeat EKG normal    - Fetal echo normal    - Serial growth US q 4 weeks - with PDC    -  testing at 32 weeks  - BPP     - Delivery 38-39 weeks pending glucose control per MFM     - Scheduled 25 (38w3d)    3. History of  section       Desires sterilization    - Desires repeat C/S + BTL -- scheduled    - Sterilization consent forms signed    Follow up for twice weekly  testing.     This note was electronically signed.    Tonya Ren MD  Obstetrics and Gynecology  [facility]

## 2025-05-19 ENCOUNTER — PATIENT OUTREACH (OUTPATIENT)
Dept: LABOR AND DELIVERY | Facility: HOSPITAL | Age: 25
End: 2025-05-19
Payer: MEDICAID

## 2025-05-19 NOTE — PROGRESS NOTES
Patient seen in Maternal Fetal Medicine clinic today. Please see full note in under imaging tab of patient chart in Epic (Viewpoint report).    Kandis Melo MD

## 2025-05-19 NOTE — OUTREACH NOTE
"[facility]  Check-In    Current Estimated Gestational Age: 35w0d      Questions/Answers      Flowsheet Row Responses   Best Method for Contacting Cell   Demographics Reviewed Yes   Currently Employed No   Able to keep appointments as scheduled Yes   Gender(s) and Name(s) Boy \"Deuce Medeiros\"   Baby Active/Feeling Fetal Movemen Yes   How are you presently feeling? Fatigued   Are you having any of the following symptoms? --  [Denies]   Questions regarding prenatal visits or tests to be ordered? No   Equipment presently used at home: Other   Other Equipment: Dexcom, insulin pump   Education related to new diagnoses/home equipment No   Supplies ready for baby Car Seat, Clothing, Crib, Diapers, Feeding Supplies   Resource/Environmental Concerns None   Do you have any questions related to your care experience, your pregnancy, plans for delivery, any concerns, etc? No   Other Education Birth Plan, How to find a pediatrician, Insurance benefits/Incentives, Meds to Beds, Mental Health Services, [facility] Benefits, Other   Other Education Comment Delivery prep              Feeling well and reports good fetal movement. Discussed what to expect when she goes in to L&D including admission process, pain medication/ERAS, spinal placement, delivery by  section, skin to skin, breastfeeding, post-delivery/PACU care, and MB care. VU.    She is feeling ready for delivery. She has enrolled in [facility] and feels like she has everything she will need for baby.    Updated resources provided via [facility] message. Contact information provided. Encouraged to call with questions, concerns, or for support. Will plan to see inpatient after delivery.    MAGDA Hamilton RN  Maternity Nurse Navigator    2025, 12:22 EDT          "

## 2025-05-20 ENCOUNTER — RESULTS FOLLOW-UP (OUTPATIENT)
Dept: OBSTETRICS AND GYNECOLOGY | Facility: CLINIC | Age: 25
End: 2025-05-20

## 2025-05-20 ENCOUNTER — ROUTINE PRENATAL (OUTPATIENT)
Dept: OBSTETRICS AND GYNECOLOGY | Facility: CLINIC | Age: 25
End: 2025-05-20
Payer: MEDICAID

## 2025-05-20 VITALS — SYSTOLIC BLOOD PRESSURE: 120 MMHG | WEIGHT: 252 LBS | DIASTOLIC BLOOD PRESSURE: 80 MMHG | BODY MASS INDEX: 43.26 KG/M2

## 2025-05-20 DIAGNOSIS — O34.219 PREVIOUS CESAREAN DELIVERY AFFECTING PREGNANCY: ICD-10-CM

## 2025-05-20 DIAGNOSIS — O24.013 PRE-EXISTING TYPE 1 DIABETES MELLITUS DURING PREGNANCY IN THIRD TRIMESTER: ICD-10-CM

## 2025-05-20 DIAGNOSIS — O09.93 HIGH-RISK PREGNANCY IN THIRD TRIMESTER: Primary | ICD-10-CM

## 2025-05-20 PROBLEM — B95.1 POSITIVE GBS TEST: Status: ACTIVE | Noted: 2025-05-20

## 2025-05-20 LAB
GLUCOSE UR STRIP-MCNC: NEGATIVE MG/DL
PROT UR STRIP-MCNC: NEGATIVE MG/DL

## 2025-05-20 NOTE — PROGRESS NOTES
Chief Complaint   Patient presents with    Routine Prenatal Visit     NST started at 0809     Soledad Quigley is a 24 y.o.  at 35w1d who presents for follow up prenatal care. Overall, she is feeling well. She denies vaginal bleeding, leakage of fluid or regular contractions. Baby is moving.  Blood sugars doing well. No concerns today. Her birthday is later this week.     Pregnancy is complicated by:  - T1DM - insulin pump and Dexcom, follows with endocrinology   - History of 1 prior  section     /80   Wt 114 kg (252 lb)   LMP 2024   BMI 43.26 kg/m²    General: No acute distress, sitting comfortably   CV: Regular heart rate  Lungs: Breathing unlabored  Abdomen: Soft, nontender, gravid  Pelvic: deferred      ASSESSMENT/PLAN    Routine prenatal care   - Pap ASCUS HPV neg  - will repeat postpartum   - Dating by ultrasound at 9 weeks gestation   - Prenatal labs: normal except UTI in early pregnancy (CURT neg)   - Testing for GC / Chlamydia / trichomonas: neg  - Genetic testing: low risk   - 20 weeks: Prenatal anatomy ultrasound at [facility] normal with normal echo. Following with growth scans every 4 weeks  - 28 weeks: Tdap 4/1, H/H, RPR testing - normal  - GBS testing -  in process  - Continue prenatal vitamins and aspirin   - UTD on flu shot     2. T1DM    - Follows with endocrinology and PDC     - Normal baseline preE labs, taking     - baseline EKG with borderline LVH -- repeat EKG normal    - Fetal echo normal    - Serial growth US q 4 weeks - with PD[facility]    -  testing at 32 weeks - reactive NST today    - Delivery 38-39 weeks pending glucose control per MFM     - Scheduled 25 (38w3d)    3. History of  section       Desires sterilization    - Desires repeat C/S + BTL -- scheduled    - Sterilization consent forms signed    NST Read  Reason for test: T1DM   Time frame: 4352-4543  Baseline 130 bpm, moderate variability, + accelerations, no  decelerations  Reactive NST     Follow up for twice weekly  testing.     This note was electronically signed.    oTnya Ren MD  Obstetrics and Gynecology  [facility]

## 2025-05-23 ENCOUNTER — ROUTINE PRENATAL (OUTPATIENT)
Dept: OBSTETRICS AND GYNECOLOGY | Facility: CLINIC | Age: 25
End: 2025-05-23
Payer: MEDICAID

## 2025-05-23 VITALS — DIASTOLIC BLOOD PRESSURE: 82 MMHG | SYSTOLIC BLOOD PRESSURE: 126 MMHG | BODY MASS INDEX: 44.11 KG/M2 | WEIGHT: 257 LBS

## 2025-05-23 DIAGNOSIS — O24.013 PRE-EXISTING TYPE 1 DIABETES MELLITUS DURING PREGNANCY IN THIRD TRIMESTER: ICD-10-CM

## 2025-05-23 DIAGNOSIS — O09.93 HIGH-RISK PREGNANCY IN THIRD TRIMESTER: Primary | ICD-10-CM

## 2025-05-23 DIAGNOSIS — O34.219 PREVIOUS CESAREAN DELIVERY AFFECTING PREGNANCY: ICD-10-CM

## 2025-05-23 LAB
GLUCOSE UR STRIP-MCNC: NEGATIVE MG/DL
PROT UR STRIP-MCNC: NEGATIVE MG/DL

## 2025-05-23 NOTE — PROGRESS NOTES
Chief Complaint   Patient presents with    Routine Prenatal Visit     BPP done today / When she turns her head a certain way she sees floaters in her vision, some swelling in her feet.     Soledad Quigley is a 25 y.o.  at 35w4d who presents for follow up prenatal care. Overall, she is feeling well. She denies vaginal bleeding, leakage of fluid or regular contractions. Baby is moving.  Blood sugars doing well. She enjoyed hibachi on her birthday. She sometimes sees floaters in her vision, but only when she turns her head very fast or is looking behind her.     Pregnancy is complicated by:  - T1DM - insulin pump and Dexcom, follows with endocrinology   - History of 1 prior  section     /82   Wt 117 kg (257 lb)   LMP 2024   BMI 44.11 kg/m²    General: No acute distress, sitting comfortably   CV: Regular heart rate  Lungs: Breathing unlabored  Abdomen: Soft, nontender, gravid  Pelvic: deferred      ASSESSMENT/PLAN    Routine prenatal care   - Pap ASCUS HPV neg  - will repeat postpartum   - Dating by ultrasound at 9 weeks gestation   - Prenatal labs: normal except UTI in early pregnancy (CURT neg)   - Testing for GC / Chlamydia / trichomonas: neg  - Genetic testing: low risk   - 20 weeks: Prenatal anatomy ultrasound at [facility] normal with normal echo. Following with growth scans every 4 weeks  - 28 weeks: Tdap 4/1, H/H, RPR testing - normal  - GBS testing -  positive   - Continue prenatal vitamins and aspirin   - UTD on flu shot     2. T1DM    - Follows with endocrinology and PDC     - Normal baseline preE labs, taking     - baseline EKG with borderline LVH -- repeat EKG normal    - Fetal echo normal    - Serial growth US q 4 weeks - with PDC    -  testing at 32 weeks - rBPP     - Delivery 38-39 weeks pending glucose control per MFM     - Scheduled 25 (38w3d)    3. History of  section       Desires sterilization    - Desires repeat C/S + BTL -- scheduled     - Sterilization consent forms signed    Follow up for twice weekly  testing.     This note was electronically signed.    Tonya Ren MD  Obstetrics and Gynecology  [facility]

## 2025-05-27 ENCOUNTER — ROUTINE PRENATAL (OUTPATIENT)
Dept: OBSTETRICS AND GYNECOLOGY | Facility: CLINIC | Age: 25
End: 2025-05-27
Payer: MEDICAID

## 2025-05-27 VITALS — SYSTOLIC BLOOD PRESSURE: 124 MMHG | BODY MASS INDEX: 43.67 KG/M2 | DIASTOLIC BLOOD PRESSURE: 78 MMHG | WEIGHT: 254.4 LBS

## 2025-05-27 DIAGNOSIS — O09.93 HIGH-RISK PREGNANCY IN THIRD TRIMESTER: Primary | ICD-10-CM

## 2025-05-27 DIAGNOSIS — O34.219 PREVIOUS CESAREAN DELIVERY AFFECTING PREGNANCY: ICD-10-CM

## 2025-05-27 DIAGNOSIS — O24.013 PRE-EXISTING TYPE 1 DIABETES MELLITUS DURING PREGNANCY IN THIRD TRIMESTER: ICD-10-CM

## 2025-05-27 PROBLEM — E66.813 CLASS 3 SEVERE OBESITY WITH BODY MASS INDEX (BMI) OF 40.0 TO 44.9 IN ADULT: Status: RESOLVED | Noted: 2022-07-08 | Resolved: 2025-05-27

## 2025-05-27 PROBLEM — R73.09 ELEVATED HEMOGLOBIN A1C: Status: RESOLVED | Noted: 2024-11-18 | Resolved: 2025-05-27

## 2025-05-27 PROBLEM — E66.01 CLASS 3 SEVERE OBESITY WITH BODY MASS INDEX (BMI) OF 40.0 TO 44.9 IN ADULT: Status: RESOLVED | Noted: 2022-07-08 | Resolved: 2025-05-27

## 2025-05-27 RX ORDER — SODIUM CHLORIDE 0.9 % (FLUSH) 0.9 %
10 SYRINGE (ML) INJECTION AS NEEDED
OUTPATIENT
Start: 2025-05-27

## 2025-05-27 RX ORDER — OXYTOCIN/0.9 % SODIUM CHLORIDE 30/500 ML
999 PLASTIC BAG, INJECTION (ML) INTRAVENOUS ONCE
OUTPATIENT
Start: 2025-05-27

## 2025-05-27 RX ORDER — MISOPROSTOL 100 UG/1
800 TABLET ORAL ONCE AS NEEDED
OUTPATIENT
Start: 2025-05-27

## 2025-05-27 RX ORDER — SODIUM CHLORIDE 9 MG/ML
40 INJECTION, SOLUTION INTRAVENOUS AS NEEDED
OUTPATIENT
Start: 2025-05-27

## 2025-05-27 RX ORDER — CARBOPROST TROMETHAMINE 250 UG/ML
250 INJECTION, SOLUTION INTRAMUSCULAR
OUTPATIENT
Start: 2025-05-27

## 2025-05-27 RX ORDER — KETOROLAC TROMETHAMINE 15 MG/ML
30 INJECTION, SOLUTION INTRAMUSCULAR; INTRAVENOUS ONCE
OUTPATIENT
Start: 2025-05-27 | End: 2025-05-27

## 2025-05-27 RX ORDER — CITRIC ACID/SODIUM CITRATE 334-500MG
30 SOLUTION, ORAL ORAL ONCE
OUTPATIENT
Start: 2025-05-27 | End: 2025-05-27

## 2025-05-27 RX ORDER — LIDOCAINE HYDROCHLORIDE 10 MG/ML
0.5 INJECTION, SOLUTION EPIDURAL; INFILTRATION; INTRACAUDAL; PERINEURAL ONCE AS NEEDED
OUTPATIENT
Start: 2025-05-27

## 2025-05-27 RX ORDER — OXYTOCIN/0.9 % SODIUM CHLORIDE 30/500 ML
250 PLASTIC BAG, INJECTION (ML) INTRAVENOUS CONTINUOUS
OUTPATIENT
Start: 2025-05-27 | End: 2025-05-27

## 2025-05-27 RX ORDER — DEXTROSE MONOHYDRATE 25 G/50ML
25 INJECTION, SOLUTION INTRAVENOUS
OUTPATIENT
Start: 2025-05-27

## 2025-05-27 RX ORDER — ACETAMINOPHEN 500 MG
1000 TABLET ORAL ONCE
OUTPATIENT
Start: 2025-05-27 | End: 2025-05-27

## 2025-05-27 RX ORDER — METHYLERGONOVINE MALEATE 0.2 MG/ML
200 INJECTION INTRAVENOUS ONCE AS NEEDED
OUTPATIENT
Start: 2025-05-27

## 2025-05-27 RX ORDER — SODIUM CHLORIDE, SODIUM LACTATE, POTASSIUM CHLORIDE, CALCIUM CHLORIDE 600; 310; 30; 20 MG/100ML; MG/100ML; MG/100ML; MG/100ML
125 INJECTION, SOLUTION INTRAVENOUS CONTINUOUS
OUTPATIENT
Start: 2025-05-27 | End: 2025-05-27

## 2025-05-27 RX ORDER — NICOTINE POLACRILEX 4 MG
15 LOZENGE BUCCAL
OUTPATIENT
Start: 2025-05-27

## 2025-05-27 RX ORDER — SODIUM CHLORIDE 0.9 % (FLUSH) 0.9 %
10 SYRINGE (ML) INJECTION EVERY 12 HOURS SCHEDULED
OUTPATIENT
Start: 2025-05-27

## 2025-05-27 NOTE — H&P
Soledad Quigley  : 2000  MRN: [id number]  CSN: [id number]    History and Physical    Subjective   Soledad Quigley is a 25 y.o. year old  with an Estimated Date of Delivery: 25 scheduled for  delivery due to previous C/S - declines .  Her placental location is anterior.  She is planning for sterilization at the time of the .    Prenatal care has been with Dr. Ren.       Pregnancy is complicated by:  - T1DM - insulin pump and Dexcom, follows with endocrinology, overall well controlled   - History of 1 prior  section     OB History    Para Term  AB Living   2 1 1 0 0 1   SAB IAB Ectopic Molar Multiple Live Births   0 0 0 0 0 1      # Outcome Date GA Lbr Rocky/2nd Weight Sex Type Anes PTL Lv   2 Current            1 Term 20 38w3d  2778 g (6 lb 2 oz) F CS-Unspec EPI N BALBINA      Complications: Fetal Intolerance, Gestational diabetes      Name: Aracelis      Apgar1: 8  Apgar5: 9      Obstetric Comments    - Aracelis (FOB #1)   Current (FOB #2)     Past Medical History:   Diagnosis Date    Childhood asthma 2006    Anxiety and depression     Off meds ~     Type 1 diabetes mellitus     Diabetes type 2, controlled 2023    Elevated hemoglobin A1c 2024                Gestational Age (wks)    4w2d    9w0d           A1c    8.1    7.0              Vitamin D deficiency      Past Surgical History:   Procedure Laterality Date     SECTION  2020       Current Outpatient Medications:     aspirin 81 MG EC tablet, Take 2 tablets by mouth Daily. (Patient taking differently: Take 1 tablet by mouth Daily.), Disp: 30 tablet, Rfl: 11    Blood Glucose Monitoring Suppl (FreeStyle Lite) w/Device kit, Use as directed to check blood sugar, Disp: 1 kit, Rfl: 0    Continuous Glucose Sensor (Dexcom G7 Sensor) misc, Use 1 each Every 10 (Ten) Days., Disp: 9 each, Rfl: 4    glucose blood test strip, Use to test blood glucose up to four  times daily as needed., Disp: 100 each, Rfl: 12    Insulin Infusion Pump Supplies (Tandem Mobi AutoSoft XC Kit) misc, Use., Disp: , Rfl:     Insulin Lispro (humaLOG) 100 UNIT/ML injection, Use as directed in pump.  units daily., Disp: 40 mL, Rfl: 5    [employer] Pen Needles 32G X 4 MM misc, USE  FOUR TIMES PER DAY AS NEEDED FOR INSULIN INJECTIONS., Disp: 100 each, Rfl: 12    Lancets (freestyle) lancets, Use 1 each 4 (Four) Times a Day., Disp: 100 each, Rfl: 12    MAGNESIUM PO, Take  by mouth., Disp: , Rfl:     ondansetron (Zofran) 4 MG tablet, Take 1 tablet by mouth Daily As Needed for Nausea or Vomiting., Disp: 30 tablet, Rfl: 1    Prenatal Vit-Fe Fumarate-FA (prenatal vitamin 27-0.8) 27-0.8 MG tablet tablet, Take  by mouth Daily., Disp: , Rfl:     vitamin D (ERGOCALCIFEROL) 1.25 MG (37390 UT) capsule capsule, Take 1 capsule by mouth 1 (One) Time Per Week., Disp: 8 capsule, Rfl: 0    insulin detemir (Levemir FlexPen) 100 UNIT/ML injection, Inject 50 Units under the skin into the appropriate area as directed Daily. Titate to fasting glucose <90.  Max daily dose 100u. (Patient not taking: Reported on 5/27/2025), Disp: 90 mL, Rfl: 3    Insulin Lispro, 1 Unit Dial, (HUMALOG) 100 UNIT/ML solution pen-injector, Use as directed 3x daily; max daily dose 100u (Patient not taking: Reported on 5/27/2025), Disp: 90 mL, Rfl: 3    Current Facility-Administered Medications:     cyanocobalamin injection 1,000 mcg, 1,000 mcg, Intramuscular, Q28 Days, Marta Chiu MD, 1,000 mcg at 08/25/23 1130    Allergies   Allergen Reactions    Sulfa Antibiotics Diarrhea, Nausea And Vomiting and GI Intolerance    Bactrim [Sulfamethoxazole-Trimethoprim] Diarrhea, Nausea And Vomiting and GI Intolerance       Social History    Tobacco Use      Smoking status: Never      Smokeless tobacco: Never    Review of Systems   Constitutional:  Negative for chills and fever.   Eyes:  Negative for visual disturbance.   Genitourinary:  Negative for  pelvic pain and vaginal bleeding.   All other systems reviewed and are negative.        Objective   /78   Wt 115 kg (254 lb 6.4 oz)   LMP 2024   BMI 43.67 kg/m²   General: well developed; well nourished  no acute distress  mentation appropriate   Heart: Not performed.   Lungs: breathing is unlabored   Abdomen: fundus firm and non-tender     Prenatal Labs  Lab Results   Component Value Date    HGB 10.9 (L) 2025    RUBELLAABIGG 1.86 2024    HEPBSAG Non-Reactive 2024    ABORH A POS 2020    ABSCRN Negative 2024    [id number] Non-Reactive 2024    HEPCVIRUSABY Non-Reactive 2024    STREPGPB pos 2025    URINECX 25,000 CFU/mL Normal Urogenital Winnie 2025    CHLAMNAA Negative 2024    NGONORRHON Negative 2024       Recent Labs  Lab Results   Component Value Date    HGB 10.9 (L) 2025    HCT 33.6 (L) 2025    WBC 10.90 (H) 2025     2025           Assessment   IUP with an Estimated Date of Delivery: 25  Planned  section for previous C/S - declines   Desires permanent sterilization      Plan   Repeat  with bilateral salpingectomy  ABx and DVT prophylaxis    Tonya Ren MD  2025

## 2025-05-27 NOTE — PROGRESS NOTES
Chief Complaint   Patient presents with    Routine Prenatal Visit     No c/c / NST started @ 1:48     Soledad Quigley is a 25 y.o.  at 36w1d who presents for follow up prenatal care. Overall, she is feeling well. She denies vaginal bleeding, leakage of fluid or regular contractions. Baby is moving.  Blood sugars doing well. No concerns today.     Pregnancy is complicated by:  - T1DM - insulin pump and Dexcom, follows with endocrinology   - History of 1 prior  section     /78   Wt 115 kg (254 lb 6.4 oz)   LMP 2024   BMI 43.67 kg/m²    General: No acute distress, sitting comfortably   CV: Regular heart rate  Lungs: Breathing unlabored  Abdomen: Soft, nontender, gravid  Pelvic: deferred      ASSESSMENT/PLAN    Routine prenatal care   - Pap ASCUS HPV neg  - will repeat postpartum   - Dating by ultrasound at 9 weeks gestation   - Prenatal labs: normal except UTI in early pregnancy (CURT neg)   - Testing for GC / Chlamydia / trichomonas: neg  - Genetic testing: low risk   - 20 weeks: Prenatal anatomy ultrasound at [facility] normal with normal echo. Following with growth scans every 4 weeks  - 28 weeks: Tdap 4/1, H/H, RPR testing - normal  - GBS testing -  positive   - Continue prenatal vitamins and aspirin   - UTD on flu shot     2. T1DM    - Follows with endocrinology and PDC     - Normal baseline preE labs, taking     - baseline EKG with borderline LVH -- repeat EKG normal    - Fetal echo normal    - Serial growth US q 4 weeks - with PDC    -  testing - NST today    - Delivery 38-39 weeks pending glucose control per MFM     - Scheduled 25 (38w3d)    3. History of  section       Desires sterilization    - Desires repeat C/S + BTL -- scheduled    - Sterilization consent forms signed    NST Read  Reason for test: T1DM   Time frame: 7690-7046  Baseline 140 bpm, moderate variability, + accelerations, no decelerations  Reactive NST     Follow up for twice weekly   testing.     This note was electronically signed.    Tonya Ren MD  Obstetrics and Gynecology  [facility] [place] [facility]

## 2025-05-29 ENCOUNTER — ROUTINE PRENATAL (OUTPATIENT)
Dept: OBSTETRICS AND GYNECOLOGY | Facility: CLINIC | Age: 25
End: 2025-05-29
Payer: MEDICAID

## 2025-05-29 VITALS — SYSTOLIC BLOOD PRESSURE: 122 MMHG | DIASTOLIC BLOOD PRESSURE: 76 MMHG | WEIGHT: 257 LBS | BODY MASS INDEX: 44.11 KG/M2

## 2025-05-29 DIAGNOSIS — O09.93 HIGH-RISK PREGNANCY IN THIRD TRIMESTER: Primary | ICD-10-CM

## 2025-05-29 DIAGNOSIS — O99.210 OBESITY IN PREGNANCY, ANTEPARTUM: ICD-10-CM

## 2025-05-29 DIAGNOSIS — O34.219 PREVIOUS CESAREAN DELIVERY AFFECTING PREGNANCY: ICD-10-CM

## 2025-05-29 DIAGNOSIS — O24.013 PRE-EXISTING TYPE 1 DIABETES MELLITUS DURING PREGNANCY IN THIRD TRIMESTER: ICD-10-CM

## 2025-05-29 NOTE — PROGRESS NOTES
Chief Complaint   Patient presents with    Routine Prenatal Visit     BPP done today () / no c/c     Soledad Quigley is a 25 y.o.  at 36w3d who presents for follow up prenatal care. Overall, she is feeling well, just tired. She denies vaginal bleeding, leakage of fluid or regular contractions. Baby is moving.  Blood sugars doing well. No concerns today.     Pregnancy is complicated by:  - T1DM - insulin pump and Dexcom, follows with endocrinology   - History of 1 prior  section     /76   Wt 117 kg (257 lb)   LMP 2024   BMI 44.11 kg/m²    General: No acute distress, sitting comfortably   CV: Regular heart rate  Lungs: Breathing unlabored  Abdomen: Soft, nontender, gravid  Pelvic: deferred      ASSESSMENT/PLAN    Routine prenatal care   - Pap ASCUS HPV neg  - will repeat postpartum   - Dating by ultrasound at 9 weeks gestation   - Prenatal labs: normal except UTI in early pregnancy (CURT neg)   - Testing for GC / Chlamydia / trichomonas: neg  - Genetic testing: low risk   - 20 weeks: Prenatal anatomy ultrasound at [facility] normal with normal echo. Following with growth scans every 4 weeks  - 28 weeks: Tdap 4/1, H/H, RPR testing - normal  - GBS testing -  positive   - Continue prenatal vitamins and aspirin   - UTD on flu shot     2. T1DM    - Follows with endocrinology and PDC     - Normal baseline preE labs, taking     - baseline EKG with borderline LVH -- repeat EKG normal    - Fetal echo normal    - Serial growth US q 4 weeks - with PDC    -  testing - BPP  today    - Delivery 38-39 weeks pending glucose control per MFM     - Scheduled 25 (38w3d)    3. History of  section       Desires sterilization    - Desires repeat C/S + BTL -- scheduled    - Sterilization consent forms signed    Follow up for twice weekly  testing.     This note was electronically signed.    Tonya Ren MD  Obstetrics and Gynecology  [facility]

## 2025-05-31 PROBLEM — O24.113 PRE-EXISTING TYPE 2 DIABETES MELLITUS DURING PREGNANCY IN THIRD TRIMESTER: Status: ACTIVE | Noted: 2024-11-15

## 2025-05-31 PROBLEM — O09.93 HIGH-RISK PREGNANCY IN THIRD TRIMESTER: Status: ACTIVE | Noted: 2024-11-15

## 2025-06-03 ENCOUNTER — ROUTINE PRENATAL (OUTPATIENT)
Dept: OBSTETRICS AND GYNECOLOGY | Facility: CLINIC | Age: 25
End: 2025-06-03
Payer: MEDICAID

## 2025-06-03 VITALS — SYSTOLIC BLOOD PRESSURE: 126 MMHG | BODY MASS INDEX: 44.29 KG/M2 | WEIGHT: 258 LBS | DIASTOLIC BLOOD PRESSURE: 72 MMHG

## 2025-06-03 DIAGNOSIS — O34.219 PREVIOUS CESAREAN DELIVERY AFFECTING PREGNANCY: ICD-10-CM

## 2025-06-03 DIAGNOSIS — O24.113 PRE-EXISTING TYPE 2 DIABETES MELLITUS DURING PREGNANCY IN THIRD TRIMESTER: ICD-10-CM

## 2025-06-03 DIAGNOSIS — O36.8130 DECREASED FETAL MOVEMENTS IN THIRD TRIMESTER, SINGLE OR UNSPECIFIED FETUS: ICD-10-CM

## 2025-06-03 DIAGNOSIS — O09.93 HIGH-RISK PREGNANCY IN THIRD TRIMESTER: Primary | ICD-10-CM

## 2025-06-03 NOTE — PROGRESS NOTES
Chief Complaint   Patient presents with    Routine Prenatal Visit       HPI: Soledad is a  currently at 37w1d who today reports the following:  Contractions - No; Leaking - No; Vaginal bleeding -  No; Swelling of extremities - No.  Has a DEXCOM and blood sugars have mostly been really good overall.  Has been requiring less insulin recently.  Has also noticed decreased fetal movement more so recently.    ROS:  GI: Nausea - No; Constipation - No; Diarrhea - No    Neuro: Headache - No; Visual change - No    Respiratory: Cough - No; SOB - No; fever - No     EXAM:  Vitals: See prenatal flowsheet   Abdomen: See prenatal flowsheet   Urine glucose/protein: See prenatal flowsheet   Pelvic: See prenatal flowsheet   MDM:   Impression: Supervision of high risk pregnancy  Pregestational diabetes  Decreased fetal movement  Prior  section with repeat  already scheduled for next week   Tests done today: Urine dip for protein and glucose  NST - reactive   Topics discussed: Prenatal labs reviewed  Continue with Rx prenatal vitamins and insulin.  No shaving in advanced of scheduled   Okay to discontinue baby aspirin at this point   Tests scheduled today for her next visit:   BPP        Non Stress Test        Patient: Soledad Quigley  : 2000  MRN: [id number]  CSN: [id number]  Date: 6/3/2025    Estimated Date of Delivery: 25  Gestational Age: 37w1d    Indication for NST diabetes mellitus       Total Time on NST > 20 minutes       Interpretation    Baseline  beats per minute   Category 1   Decelerations Absent       Additional Comments none       This note has been electronically signed.    Ryan Wilkerson MD  6/3/2025  09:54 EDT

## 2025-06-06 ENCOUNTER — ROUTINE PRENATAL (OUTPATIENT)
Dept: OBSTETRICS AND GYNECOLOGY | Facility: CLINIC | Age: 25
End: 2025-06-06
Payer: MEDICAID

## 2025-06-06 VITALS — SYSTOLIC BLOOD PRESSURE: 122 MMHG | DIASTOLIC BLOOD PRESSURE: 80 MMHG | BODY MASS INDEX: 44.29 KG/M2 | WEIGHT: 258 LBS

## 2025-06-06 DIAGNOSIS — O34.219 PREVIOUS CESAREAN DELIVERY AFFECTING PREGNANCY: ICD-10-CM

## 2025-06-06 DIAGNOSIS — O09.93 HIGH-RISK PREGNANCY IN THIRD TRIMESTER: Primary | ICD-10-CM

## 2025-06-06 DIAGNOSIS — E10.9 TYPE 1 DIABETES MELLITUS WITHOUT COMPLICATION: ICD-10-CM

## 2025-06-06 NOTE — PROGRESS NOTES
Chief Complaint   Patient presents with    Routine Prenatal Visit     Bpp 8       HPI: Soledad is a  currently at 37w4d who today reports the following:  Contractions - inconsistent cramping. She does not think these are true contractions.; Leaking - No; Vaginal bleeding -  No; Swelling of extremities - swelling in her feet, but feels it has improved since she stopped working (LLE>RLE due to varicose veins). Reports good fetal movement. Reports good glycemic control - following endocrinology.     ROS:  GI: Nausea - No; Constipation - No; Diarrhea - No    Neuro: Headache - No; Visual change - No    Respiratory: Cough - No; SOB - yes, related to baby's positioning; fever - No     EXAM:  Vitals: See prenatal flowsheet   Abdomen: See prenatal flowsheet   Urine glucose/protein: See prenatal flowsheet   Pelvic: See prenatal flowsheet   MDM:   Impression: Supervision of high risk pregnancy  DM - Pregestational  Previous C/S with planned repeat C/S   Tests done today: Urine dip for protein and glucose  BPP -    Topics discussed: Prenatal labs reviewed  Continue with Rx prenatal vitamins.  No additional counseling given - she has no specific complaints or concerns   Tests scheduled today for her next visit:   U/S for EFW and BPP at [facility]